# Patient Record
Sex: MALE | Race: WHITE | NOT HISPANIC OR LATINO | Employment: FULL TIME | URBAN - METROPOLITAN AREA
[De-identification: names, ages, dates, MRNs, and addresses within clinical notes are randomized per-mention and may not be internally consistent; named-entity substitution may affect disease eponyms.]

---

## 2018-08-16 ENCOUNTER — APPOINTMENT (OUTPATIENT)
Dept: LAB | Facility: HOSPITAL | Age: 43
End: 2018-08-16

## 2018-08-16 ENCOUNTER — TRANSCRIBE ORDERS (OUTPATIENT)
Dept: ADMINISTRATIVE | Facility: HOSPITAL | Age: 43
End: 2018-08-16

## 2018-08-16 DIAGNOSIS — Z00.8 HEALTH EXAMINATION IN POPULATION SURVEYS: ICD-10-CM

## 2018-08-16 DIAGNOSIS — Z00.8 HEALTH EXAMINATION IN POPULATION SURVEYS: Primary | ICD-10-CM

## 2018-08-16 LAB
CHOLEST SERPL-MCNC: 271 MG/DL (ref 0–200)
EST. AVERAGE GLUCOSE BLD GHB EST-MCNC: 114 MG/DL
HBA1C MFR BLD: 5.6 % (ref 4.2–6.3)
HDLC SERPL-MCNC: 52 MG/DL (ref 40–60)
LDLC SERPL CALC-MCNC: 193 MG/DL (ref 75–193)
NONHDLC SERPL-MCNC: 219 MG/DL
TRIGL SERPL-MCNC: 130 MG/DL (ref 44–166)

## 2018-08-16 PROCEDURE — 80061 LIPID PANEL: CPT

## 2018-08-16 PROCEDURE — 83036 HEMOGLOBIN GLYCOSYLATED A1C: CPT

## 2018-08-16 PROCEDURE — 36415 COLL VENOUS BLD VENIPUNCTURE: CPT

## 2021-01-12 ENCOUNTER — TELEPHONE (OUTPATIENT)
Dept: HEMATOLOGY ONCOLOGY | Facility: CLINIC | Age: 46
End: 2021-01-12

## 2021-01-12 NOTE — TELEPHONE ENCOUNTER
New Patient Encounter    New Patient Intake Form   Patient Details:  Milagro Juarez  1975  [id number]    Background Information:  [address] starts by opening a telephone encounter and gathering the following information   Who is calling to schedule? If not self, relationship to patient? provider   Referring Provider Kirby James   What is the diagnosis? Melanoma level IV back   Is this diagnosis confirmed? Yes   When was the diagnosis? 1/2021   Is there a confirmed diagnosis from a biopsy/tissue reviewed by pathology? yes   Were outside slides requested? Yes ([place])   Is patient aware of diagnosis? Yes   Is there a personal history and what kind? no   Is there a family history and what kind? melanoma   Reason for visit? New Diagnosis   Have you had any imaging or labs done? If so: when, where? no     Are records in EPIC? no   If patient has a prior history of breast cancer were old records obtained? NA   Was the patient told to bring a disk? no   Does the patient smoke or Vape? no   If yes, how many packs or cartridges per day? Scheduling Information:   Preferred [place]:  [place]     Are there any dates/time the patient cannot be seen? Miscellaneous: pt referred for wide excision and SLN bx  They will fax reports, slides will be requested  I will call pt to schedule when I have reports  I also asked that they include insurance information   After completing the above information, please route to Financial Counselor and the appropriate Nurse Navigator for review

## 2021-01-13 NOTE — TELEPHONE ENCOUNTER
Records rcd  Sent to right fax  Pt scheduled for 1/15/21 w/ Dr Carlos Pichardo  Pt has new [employer]  Office will faxe copy of card, pt does not have this info

## 2021-01-14 PROBLEM — C43.59 MALIGNANT MELANOMA OF UPPER BACK (HCC): Status: ACTIVE | Noted: 2021-01-14

## 2021-01-14 NOTE — PATIENT INSTRUCTIONS
Excision of Skin Lesion   AMBULATORY CARE:   What you need to know about excision of a skin lesion:  Excision of a skin lesion is surgery to remove a piece of skin tissue  The skin tissue may be malignant (skin cancer) or it may be benign  Benign means the skin tissue does not have cancer cells and cannot spread  How to prepare for excision of a skin lesion:  Your healthcare provider will talk to you about how to prepare for surgery  He may tell you not to eat or drink anything after midnight on the day of your surgery  He will tell you what medicines to take or not take on the day of your surgery  You may be given an antibiotic through your IV to help prevent a bacterial infection  What will happen during excision of a skin lesion:  You will be given local anesthesia to numb the surgery area  With local anesthesia, you may still feel pressure or pushing during surgery, but you should not feel any sharp pain  Your healthcare provider will lara the area of your skin that will be removed  He will make an incision on the marked area  He will remove the outer layer of your skin  He may also remove deeper layers of tissue underneath your skin  He may use heat to stop any bleeding  He will close the incision with stitches, staples, tissue glue, or medical tape  He may cover your incision with a bandage  Your healthcare provider may send samples of your tissue to the lab for tests  What will happen after excision of a skin lesion:  Your stitches will need to be removed after a period of time  The amount of time depends on the part of the body where the surgery was done  Stitches on the face will be removed within 5 to 7 days  Stitches on the trunk of your body will be removed within 7 to 10 days  Stitches on your arms or legs will be removed within 10 to 14 days  Medical tape usually falls off on its own in about 7 to 10 days  Risks of excision of a skin lesion:  You may bleed more than expected or get an infection  You may lose feeling, or you may feel tingling or prickling in the surgery area  Your scar may not look the way you expected  It may also limit your movement or affect your expressions if you had surgery on your face  Seek care immediately if:   · Your stitches come apart and the wound opens  Contact your healthcare provider if:   · You have pain in your incision that does not get better with medicine  · You have a fever or chills  · Your wound is red, swollen, bleeding, or draining pus  · You have questions or concerns about your condition or care  Care for your wound as directed:  Carefully wash the wound with soap and water  Dry the area and put on new, clean bandages as directed  Change your bandages when they get wet or dirty  You may take a shower 24 hours after  your surgery  Do not  soak in water (bathtub, hot tub, swimming pool) until after your stitches have been removed  Check your wound for signs of infection such as redness, swelling, or pus drainage  Follow up with your healthcare provider as directed: You will need to return to have your stitches removed  Write down your questions so you remember to ask them during your visits  © Copyright [address] Information is for End User's use only and may not be sold, redistributed or otherwise used for commercial purposes  All illustrations and images included in CareNotes® are the copyrighted property of [employer]  or [employer]  The above information is an  only  It is not intended as medical advice for individual conditions or treatments  Talk to your doctor, nurse or pharmacist before following any medical regimen to see if it is safe and effective for you  [place] Lymph Node Biopsy   AMBULATORY CARE:   What you need to know about a sentinel lymph node biopsy:  A sentinel lymph node (SLN) is usually the lymph node closest to a tumor  A biopsy is a procedure used to find and remove a SLN  During the biopsy, the SLN will be tested for cancer cells  If the test is positive, it may mean that cancer has spread to other places in your body  This information can help your healthcare provider decide what other treatments you need  How to prepare for a sentinel lymph node biopsy:   · You may need a nuclear scan before your procedure  During a nuclear scan, healthcare providers will inject a small amount of radioactive liquid near the tumor  Radioactive liquid will move to the location of your lymph nodes and help them show up better in pictures  A camera will take pictures of the lymph nodes  The pictures will help your healthcare provider plan for your procedure  · Your healthcare provider will talk to you about how to prepare for your procedure  He may tell you not to eat or drink anything after midnight on the day of your procedure  He will tell you what medicines to take or not take on the day of your procedure  You may be given contrast liquid during your biopsy  Tell your healthcare provider if you have ever had an allergic reaction to contrast liquid  Arrange for someone to drive you home and stay with you after your procedure  What will happen during a sentinel lymph node biopsy:   · You may be given an antibiotic through your IV to help prevent a bacterial infection  You may be given general anesthesia to keep you asleep and free from pain during procedure  You may instead be given local anesthesia to numb the area  With local anesthesia, you may still feel pressure or pushing during the procedure, but you should not feel any pain  · Your healthcare provider will inject blue contrast liquid, radioactive liquid, or both near the tumor  The liquid will move to the SLN  Your healthcare provider may use an instrument to help find the SLN  He will do this by gently moving an instrument over your skin  The instrument will show pictures of the SLN on a monitor   Your healthcare provider will make a small incision in the skin that covers the SLN  The SLN will be removed and checked for cancer cells  If cancer is found, your healthcare provider may remove several more lymph nodes for testing  Your incision may be closed with stitches or strips of medical tape and covered with a bandage  What will happen after a sentinel lymph node biopsy:  Healthcare providers will monitor you until you are awake  You may be able to go home after you are awake and your pain is controlled  Your urine or bowel movement may be blue for 24 to 48 hours after your procedure  This is caused by the blue contrast liquid given to you during the procedure  You may have bruising or swelling at the biopsy site  This is normal and expected  The arm or leg closest to the biopsy site may be sore  This should get better within 48 to 72 hours  Risks of a sentinel lymph node biopsy: You may bleed more than expected or get an infection  You may develop a condition called lymphedema  Lymphedema is tissue swelling in the body part nearest to where the SLN was removed  You may have long-term pain or discomfort in this area  Your skin in this area may be permanently thick or hard  Your nerves may be damaged during the procedure  This may cause numbness or tingling where the SLN was removed  It may also cause difficulty moving the body part closest to the SLN  You may have an allergic reaction to the contrast liquid  This may require medicine or other treatments  Seek care immediately if:   · Blood soaks through your bandage  · Your stitches come apart  · Your bruise suddenly gets larger and feels firm  Contact your healthcare provider if:   · You have a fever or chills  · Your wound is red, swollen, or draining pus  · You have nausea or are vomiting  · Your skin is itchy, swollen, or you have a rash  · Your pain does not get better after you take medicine for pain       · You have questions or concerns about your condition or care  Medicines: You may need any of the following:  · NSAIDs , such as ibuprofen, help decrease swelling, pain, and fever  This medicine is available with or without a doctor's order  NSAIDs can cause stomach bleeding or kidney problems in certain people  If you take blood thinner medicine, always ask your healthcare provider if NSAIDs are safe for you  Always read the medicine label and follow directions  · Acetaminophen  decreases pain and fever  It is available without a doctor's order  Ask how much to take and how often to take it  Follow directions  Read the labels of all other medicines you are using to see if they also contain acetaminophen, or ask your doctor or pharmacist  Acetaminophen can cause liver damage if not taken correctly  Do not use more than 4 grams (4,000 milligrams) total of acetaminophen in one day  · Prescription pain medicine  may be given  Ask your healthcare provider how to take this medicine safely  Some prescription pain medicines contain acetaminophen  Do not take other medicines that contain acetaminophen without talking to your healthcare provider  Too much acetaminophen may cause liver damage  Prescription pain medicine may cause constipation  Ask your healthcare provider how to prevent or treat constipation  · Take your medicine as directed  Contact your healthcare provider if you think your medicine is not helping or if you have side effects  Tell him or her if you are allergic to any medicine  Keep a list of the medicines, vitamins, and herbs you take  Include the amounts, and when and why you take them  Bring the list or the pill bottles to follow-up visits  Carry your medicine list with you in case of an emergency  Care for your wound as directed:  Ask your healthcare provider when your incision can get wet  Carefully wash around the incision with soap and water  It is okay to let soap and water gently run over your incision   Do not  scrub your incision  Gently pat dry the area and put on new, clean bandages as directed  Change your bandages when they get wet or dirty  If you have strips of medical tape, let them fall of on their own  It may take 10 to 14 days for them to fall off  Check your incision every day for signs of infection, such as redness, swelling, or pus  Do not put powders or lotions on your incision  If lymph nodes have been taken from your armpit, ask your healthcare provider when you can wear deodorant  Self-care:   · Apply ice  on your incision for 15 to 20 minutes every hour or as directed  Use an ice pack, or put crushed ice in a plastic bag  Cover it with a towel before you apply it to your skin  Ice helps prevent tissue damage and decreases swelling and pain  · Elevate  your arm or leg nearest to the biopsy site as often as you can  This will help decrease swelling and pain  Prop your arm or leg on pillows or blankets to keep it elevated above the level of your heart comfortably  · Do not do strenuous activities  for 24 to 48 hours  Strenuous activities include heavy lifting, sports, or running  If lymph nodes were taken from your armpit, do not push or pull with your arm  These activities may put too much stress on your incision  Rest and take short walks around the house  Ask your healthcare provider when you can return to your normal activities  · Drink plenty of liquids  as directed  This will help flush out contrast liquid from your body  Ask how much liquid to drink each day and which liquids are best for you  Ask your healthcare provider how to prevent lymphedema and infection:  Lymphedema is fluid buildup in fatty tissues under your skin  Lymphedema may happen where lymph nodes were removed or in the arm or leg closest to this area  An infection in your skin can make lymphedema worse  Ask your healthcare provider how you can decrease your risk for skin infections and lymphedema     Follow up with your healthcare provider as directed:  Write down your questions so you remember to ask them during your visits  © Copyright [address] Information is for End User's use only and may not be sold, redistributed or otherwise used for commercial purposes  All illustrations and images included in CareNotes® are the copyrighted property of [employer] Inc  or Dimitry Phillips  The above information is an  only  It is not intended as medical advice for individual conditions or treatments  Talk to your doctor, nurse or pharmacist before following any medical regimen to see if it is safe and effective for you

## 2021-01-14 NOTE — TELEPHONE ENCOUNTER
Insurance entered  Slides requested from [address] [place] [address]  Faxed to [phone]

## 2021-01-15 ENCOUNTER — PATIENT OUTREACH (OUTPATIENT)
Dept: CASE MANAGEMENT | Facility: HOSPITAL | Age: 46
End: 2021-01-15

## 2021-01-15 ENCOUNTER — CONSULT (OUTPATIENT)
Dept: SURGICAL ONCOLOGY | Facility: CLINIC | Age: 46
End: 2021-01-15
Payer: COMMERCIAL

## 2021-01-15 VITALS
HEIGHT: 68 IN | BODY MASS INDEX: 30.92 KG/M2 | HEART RATE: 112 BPM | SYSTOLIC BLOOD PRESSURE: 160 MMHG | RESPIRATION RATE: 16 BRPM | WEIGHT: 204 LBS | DIASTOLIC BLOOD PRESSURE: 92 MMHG | TEMPERATURE: 97.6 F

## 2021-01-15 DIAGNOSIS — C43.59 MALIGNANT MELANOMA OF UPPER BACK (HCC): Primary | ICD-10-CM

## 2021-01-15 PROCEDURE — 99204 OFFICE O/P NEW MOD 45 MIN: CPT | Performed by: SURGERY

## 2021-01-15 RX ORDER — TRAMADOL HYDROCHLORIDE 50 MG/1
50 TABLET ORAL EVERY 6 HOURS PRN
Qty: 10 TABLET | Refills: 0 | Status: SHIPPED | OUTPATIENT
Start: 2021-01-15 | End: 2021-02-22 | Stop reason: ALTCHOICE

## 2021-01-15 NOTE — PROGRESS NOTES
Surgical Oncology Follow Up       [facility] SURGICAL ONCOLOGY [place]  [facility] [address]    Etha Heimlich  1975  [id number]  [facility] SURGICAL ONCOLOGY [facility] [address]    Chief Complaint   Patient presents with    Melanoma     Pt here for consult for melanoma on his upper back  Pt states there was a small mole there for about a year, and it started to change approx 1-2 months ago  Reports it was pink and raised  Denies any other complaints  Assessment/Plan:    No problem-specific Assessment & Plan notes found for this encounter  Diagnoses and all orders for this visit:    Malignant melanoma of upper back [facility])  -     Ambulatory referral to social work care management program; Future        Advance Care Planning/Advance Directives:  Discussed disease status, cancer treatment plans and/or cancer treatment goals with the patient  Oncology History   Malignant melanoma of upper back ([facility] [address] )   1/4/2020 Biopsy    Left upper back shave biopsy:  - Malignant melanoma 2 3mm    Mitosis: 6  Ulceration: not identified         History of Present Illness:  Bee Hernandez is a 57-year-old man who over last year has had a pink spot on his left upper back  About a month ago, he noticed this started change  This culminated in a biopsy confirming a 32 3 mm deep melanoma  He has been referred for evaluation and treatment  He has fair skin and sunburns easily  Fair amount of sun exposure growing up  No prior skin cancer history  His father had a basal cell cancer treated in the past     Review of Systems   Constitutional: Negative  HENT: Negative  Eyes: Negative  Respiratory: Negative  Cardiovascular: Negative  Gastrointestinal: Negative  Endocrine: Negative  Genitourinary: Negative  Musculoskeletal: Negative      Skin:        Back melanoma   Allergic/Immunologic: Negative  Neurological: Negative  Hematological: Negative  Psychiatric/Behavioral: Negative  Patient Active Problem List   Diagnosis    Malignant melanoma of upper back ([facility] [address] )     History reviewed  No pertinent past medical history  History reviewed  No pertinent surgical history  Family History   Problem Relation Age of Onset    Basal cell carcinoma Father     No Known Problems Mother     Breast cancer Paternal Aunt 43    Breast cancer Paternal Grandmother [de-identified]     Social History     Socioeconomic History    Marital status: /Civil Union     Spouse name: Not on file    Number of children: Not on file    Years of education: Not on file    Highest education level: Not on file   Occupational History    Not on file   Social Needs    Financial resource strain: Not on file    Food insecurity     Worry: Not on file     Inability: Not on file   [employer] needs     Medical: Not on file     Non-medical: Not on file   Tobacco Use    Smoking status: Never Smoker    Smokeless tobacco: Never Used   Substance and Sexual Activity    Alcohol use: Yes    Drug use: Not on file    Sexual activity: Not on file   Lifestyle    Physical activity     Days per week: Not on file     Minutes per session: Not on file    Stress: Not on file   Relationships    Social connections     Talks on phone: Not on file     Gets together: Not on file     Attends Oriental orthodox service: Not on file     Active member of club or organization: Not on file     Attends meetings of clubs or organizations: Not on file     Relationship status: Not on file    Intimate partner violence     Fear of current or ex partner: Not on file     Emotionally abused: Not on file     Physically abused: Not on file     Forced sexual activity: Not on file   Other Topics Concern    Not on file   Social History Narrative    Not on file     No current outpatient medications on file    No Known Allergies  Vitals:    01/15/21 0843   BP: 160/92   Pulse: (!) 112   Resp: 16   Temp: 97 6 °F (36 4 °C)       Physical Exam  Vitals signs reviewed  Constitutional:       Appearance: Normal appearance  He is normal weight  HENT:      Head: Normocephalic and atraumatic  Nose: Nose normal       Mouth/Throat:      Mouth: Mucous membranes are dry  Eyes:      Extraocular Movements: Extraocular movements intact  Pupils: Pupils are equal, round, and reactive to light  Neck:      Musculoskeletal: Normal range of motion and neck supple  Cardiovascular:      Rate and Rhythm: Normal rate and regular rhythm  Pulses: Normal pulses  Heart sounds: Normal heart sounds  Pulmonary:      Effort: Pulmonary effort is normal    Abdominal:      General: Abdomen is flat  Palpations: Abdomen is soft  Musculoskeletal: Normal range of motion  Skin:     General: Skin is warm and dry  Comments: Left upper back scab  No satellite lesions  Scab measures approximately 8 mm in diameter  No cervical or axillary adenopathy  Neurological:      General: No focal deficit present  Mental Status: He is alert and oriented to person, place, and time  Psychiatric:         Mood and Affect: Mood normal          Behavior: Behavior normal          Thought Content: Thought content normal          Judgment: Judgment normal          Pathology:  Outside pathology report confirming 2 3 mm deep melanoma, 6 mitoses per mm squared, no ulceration  Labs:  CBC, Coags, BMP, Mg, Phos     Imaging  No results found  I reviewed the above laboratory and imaging data  Discussion/Summary:  49-year-old man, T3a melanoma, merit ting wide excision with sentinel node biopsy  Rationale for this along with risks and benefits of surgery including infection, bleeding, need for possible additional surgery, wound problems, discussed with him  He understands the plan and wishes to proceed as outlined  All questions answered and consents signed at this visit

## 2021-01-15 NOTE — LETTER
January 15, 2021     Chad Omer  [id number] [address]    Patient: Arielle Glass   YOB: 1975   Date of Visit: 1/15/2021       Dear Dr Eun Gutierrez:    Thank you for referring Em Menchaca to me for evaluation  Below are my notes for this consultation  If you have questions, please do not hesitate to call me  I look forward to following your patient along with you  Sincerely,        Bakari Miranda MD        CC: No Recipients  Bakari Miranda MD  1/15/2021  9:26 AM  Signed               Surgical Oncology Follow Up       [facility] SURGICAL ONCOLOGY [facility] [address]    Arielle Glass  1975  [id number]  [facility] SURGICAL ONCOLOGY [address] [address]    Chief Complaint   Patient presents with    Melanoma     Pt here for consult for melanoma on his upper back  Pt states there was a small mole there for about a year, and it started to change approx 1-2 months ago  Reports it was pink and raised  Denies any other complaints  Assessment/Plan:    No problem-specific Assessment & Plan notes found for this encounter  Diagnoses and all orders for this visit:    Malignant melanoma of upper back [facility])  -     Ambulatory referral to social work care management program; Future        Advance Care Planning/Advance Directives:  Discussed disease status, cancer treatment plans and/or cancer treatment goals with the patient  Oncology History   Malignant melanoma of upper back ([facility] [address] )   1/4/2020 Biopsy    Left upper back shave biopsy:  - Malignant melanoma 2 3mm    Mitosis: 6  Ulceration: not identified         History of Present Illness:  Jose Macias is a 64-year-old man who over last year has had a pink spot on his left upper back  About a month ago, he noticed this started change  This culminated in a biopsy confirming a 32 3 mm deep melanoma    He has been referred for evaluation and treatment  He has fair skin and sunburns easily  Fair amount of sun exposure growing up  No prior skin cancer history  His father had a basal cell cancer treated in the past     Review of Systems   Constitutional: Negative  HENT: Negative  Eyes: Negative  Respiratory: Negative  Cardiovascular: Negative  Gastrointestinal: Negative  Endocrine: Negative  Genitourinary: Negative  Musculoskeletal: Negative  Skin:        Back melanoma   Allergic/Immunologic: Negative  Neurological: Negative  Hematological: Negative  Psychiatric/Behavioral: Negative  Patient Active Problem List   Diagnosis    Malignant melanoma of upper back ([facility] [address] )     History reviewed  No pertinent past medical history  History reviewed  No pertinent surgical history  Family History   Problem Relation Age of Onset    Basal cell carcinoma Father     No Known Problems Mother     Breast cancer Paternal Aunt 43    Breast cancer Paternal Grandmother [de-identified]     Social History     Socioeconomic History    Marital status: /Civil Union     Spouse name: Not on file    Number of children: Not on file    Years of education: Not on file    Highest education level: Not on file   Occupational History    Not on file   Social Needs    Financial resource strain: Not on file    Food insecurity     Worry: Not on file     Inability: Not on file   [employer] needs     Medical: Not on file     Non-medical: Not on file   Tobacco Use    Smoking status: Never Smoker    Smokeless tobacco: Never Used   Substance and Sexual Activity    Alcohol use:  Yes    Drug use: Not on file    Sexual activity: Not on file   Lifestyle    Physical activity     Days per week: Not on file     Minutes per session: Not on file    Stress: Not on file   Relationships    Social connections     Talks on phone: Not on file     Gets together: Not on file     Attends Faith service: Not on file Active member of club or organization: Not on file     Attends meetings of clubs or organizations: Not on file     Relationship status: Not on file    Intimate partner violence     Fear of current or ex partner: Not on file     Emotionally abused: Not on file     Physically abused: Not on file     Forced sexual activity: Not on file   Other Topics Concern    Not on file   Social History Narrative    Not on file     No current outpatient medications on file  No Known Allergies  Vitals:    01/15/21 0843   BP: 160/92   Pulse: (!) 112   Resp: 16   Temp: 97 6 °F (36 4 °C)       Physical Exam  Vitals signs reviewed  Constitutional:       Appearance: Normal appearance  He is normal weight  HENT:      Head: Normocephalic and atraumatic  Nose: Nose normal       Mouth/Throat:      Mouth: Mucous membranes are dry  Eyes:      Extraocular Movements: Extraocular movements intact  Pupils: Pupils are equal, round, and reactive to light  Neck:      Musculoskeletal: Normal range of motion and neck supple  Cardiovascular:      Rate and Rhythm: Normal rate and regular rhythm  Pulses: Normal pulses  Heart sounds: Normal heart sounds  Pulmonary:      Effort: Pulmonary effort is normal    Abdominal:      General: Abdomen is flat  Palpations: Abdomen is soft  Musculoskeletal: Normal range of motion  Skin:     General: Skin is warm and dry  Comments: Left upper back scab  No satellite lesions  Scab measures approximately 8 mm in diameter  No cervical or axillary adenopathy  Neurological:      General: No focal deficit present  Mental Status: He is alert and oriented to person, place, and time  Psychiatric:         Mood and Affect: Mood normal          Behavior: Behavior normal          Thought Content:  Thought content normal          Judgment: Judgment normal          Pathology:  Outside pathology report confirming 2 3 mm deep melanoma, 6 mitoses per mm squared, no ulceration  Labs:  CBC, Coags, BMP, Mg, Phos     Imaging  No results found  I reviewed the above laboratory and imaging data  Discussion/Summary:  42-year-old man, T3a melanoma, merit ting wide excision with sentinel node biopsy  Rationale for this along with risks and benefits of surgery including infection, bleeding, need for possible additional surgery, wound problems, discussed with him  He understands the plan and wishes to proceed as outlined  All questions answered and consents signed at this visit

## 2021-01-15 NOTE — PROGRESS NOTES
Oncology LSW received pt's completed distress thermometer from this morning in which pt self scored a 10/10 and indicated fears, nervousness, and worry as emotional concerns  Because of pt's high distress score, a priority outreach call was made today  Ramses Metz is a pleasant 51 y/o gentleman with newly diagnosed melanoma on his upper back  Introduced self and role  Ramses Metz stated he is much better since his appointment and the doctor helped put him at ease  He explained when he got the news of his biopsy, the information given was that he had "Stage IV Melanoma" which naturally made him upset  He lost sleep for a week  Ramses Metz explained once he clarified further, he actually has "Level IV Melanoma" which means something very different  Ramses Choupat stated he is better now that he has the correct information and a better understanding  He is scheduled for surgical resection next month, and shared is nervous because he never had surgery before  Pt does have a h/o anxiety  LSW offered emotional support as needed  Ramses Metz reported he may call a few days prior to surgery for support  LSW invited and encouraged pt to do so as needed

## 2021-01-20 ENCOUNTER — HOSPITAL ENCOUNTER (OUTPATIENT)
Dept: RADIOLOGY | Facility: HOSPITAL | Age: 46
Discharge: HOME | End: 2021-01-20
Payer: COMMERCIAL

## 2021-01-20 ENCOUNTER — TRANSCRIBE ORDERS (OUTPATIENT)
Dept: LAB | Facility: HOSPITAL | Age: 46
End: 2021-01-20

## 2021-01-20 ENCOUNTER — APPOINTMENT (OUTPATIENT)
Dept: LAB | Facility: HOSPITAL | Age: 46
End: 2021-01-20
Payer: COMMERCIAL

## 2021-01-20 ENCOUNTER — OFFICE VISIT (OUTPATIENT)
Dept: LAB | Facility: HOSPITAL | Age: 46
End: 2021-01-20
Payer: COMMERCIAL

## 2021-01-20 DIAGNOSIS — C43.59 MALIGNANT MELANOMA OF UPPER BACK (HCC): ICD-10-CM

## 2021-01-20 LAB
ALBUMIN SERPL BCP-MCNC: 4.5 G/DL (ref 3.5–5.7)
ALP SERPL-CCNC: 43 U/L (ref 40–150)
ALT SERPL W P-5'-P-CCNC: 77 U/L (ref 7–52)
ANION GAP SERPL CALCULATED.3IONS-SCNC: 8 MMOL/L (ref 4–13)
AST SERPL W P-5'-P-CCNC: 43 U/L (ref 13–39)
ATRIAL RATE: 83 BPM
BASOPHILS # BLD AUTO: 0 THOUSANDS/ΜL (ref 0–0.1)
BASOPHILS NFR BLD AUTO: 1 % (ref 0–2)
BILIRUB SERPL-MCNC: 0.6 MG/DL (ref 0.2–1)
BUN SERPL-MCNC: 17 MG/DL (ref 7–25)
CALCIUM SERPL-MCNC: 9.1 MG/DL (ref 8.6–10.5)
CHLORIDE SERPL-SCNC: 105 MMOL/L (ref 98–107)
CO2 SERPL-SCNC: 25 MMOL/L (ref 21–31)
CREAT SERPL-MCNC: 1.17 MG/DL (ref 0.7–1.3)
EOSINOPHIL # BLD AUTO: 0.1 THOUSAND/ΜL (ref 0–0.61)
EOSINOPHIL NFR BLD AUTO: 2 % (ref 0–5)
ERYTHROCYTE [DISTWIDTH] IN BLOOD BY AUTOMATED COUNT: 13.6 % (ref 11.5–14.5)
GFR SERPL CREATININE-BSD FRML MDRD: 74 ML/MIN/1.73SQ M
GLUCOSE P FAST SERPL-MCNC: 115 MG/DL (ref 65–99)
HCT VFR BLD AUTO: 46.4 % (ref 42–47)
HGB BLD-MCNC: 15.5 G/DL (ref 14–18)
LYMPHOCYTES # BLD AUTO: 2.1 THOUSANDS/ΜL (ref 0.6–4.47)
LYMPHOCYTES NFR BLD AUTO: 41 % (ref 21–51)
MCH RBC QN AUTO: 31.2 PG (ref 26–34)
MCHC RBC AUTO-ENTMCNC: 33.3 G/DL (ref 31–37)
MCV RBC AUTO: 94 FL (ref 81–99)
MONOCYTES # BLD AUTO: 0.3 THOUSAND/ΜL (ref 0.17–1.22)
MONOCYTES NFR BLD AUTO: 6 % (ref 2–12)
NEUTROPHILS # BLD AUTO: 2.7 THOUSANDS/ΜL (ref 1.4–6.5)
NEUTS SEG NFR BLD AUTO: 51 % (ref 42–75)
P AXIS: 40 DEGREES
PLATELET # BLD AUTO: 188 THOUSANDS/UL (ref 149–390)
PMV BLD AUTO: 9.6 FL (ref 8.6–11.7)
POTASSIUM SERPL-SCNC: 3.9 MMOL/L (ref 3.5–5.5)
PR INTERVAL: 162 MS
PROT SERPL-MCNC: 7.2 G/DL (ref 6.4–8.9)
QRS AXIS: 12 DEGREES
QRSD INTERVAL: 88 MS
QT INTERVAL: 372 MS
QTC INTERVAL: 437 MS
RBC # BLD AUTO: 4.95 MILLION/UL (ref 4.3–5.9)
SODIUM SERPL-SCNC: 138 MMOL/L (ref 134–143)
T WAVE AXIS: 23 DEGREES
VENTRICULAR RATE: 83 BPM
WBC # BLD AUTO: 5.2 THOUSAND/UL (ref 4.8–10.8)

## 2021-01-20 PROCEDURE — 80053 COMPREHEN METABOLIC PANEL: CPT

## 2021-01-20 PROCEDURE — 71046 X-RAY EXAM CHEST 2 VIEWS: CPT

## 2021-01-20 PROCEDURE — 93010 ELECTROCARDIOGRAM REPORT: CPT | Performed by: INTERNAL MEDICINE

## 2021-01-20 PROCEDURE — 85025 COMPLETE CBC W/AUTO DIFF WBC: CPT

## 2021-01-20 PROCEDURE — 36415 COLL VENOUS BLD VENIPUNCTURE: CPT

## 2021-01-21 ENCOUNTER — LAB REQUISITION (OUTPATIENT)
Dept: LAB | Facility: HOSPITAL | Age: 46
End: 2021-01-21
Payer: COMMERCIAL

## 2021-01-21 DIAGNOSIS — C43.59 MALIGNANT MELANOMA OF OTHER PART OF TRUNK (HCC): ICD-10-CM

## 2021-01-21 PROCEDURE — 88321 CONSLTJ&REPRT SLD PREP ELSWR: CPT | Performed by: STUDENT IN AN ORGANIZED HEALTH CARE EDUCATION/TRAINING PROGRAM

## 2021-02-01 ENCOUNTER — ANESTHESIA EVENT (OUTPATIENT)
Dept: PERIOP | Facility: HOSPITAL | Age: 46
End: 2021-02-01
Payer: COMMERCIAL

## 2021-02-01 NOTE — PRE-PROCEDURE INSTRUCTIONS
No outpatient medications have been marked as taking for the 2/2/21 encounter [facility] Encounter)  Have you had / have a sore throat? NO   have you had / have a cough less than 1 week? NO  Have you had / have a fever greater than 100 0 - 100  4? NO  Are you experiencing any shortness of breath?  NO    Pre procedure instructions given, verbalizes understanding REVIEWED ON

## 2021-02-02 ENCOUNTER — HOSPITAL ENCOUNTER (OUTPATIENT)
Dept: RADIOLOGY | Facility: HOSPITAL | Age: 46
Discharge: HOME | End: 2021-02-02
Attending: SURGERY
Payer: COMMERCIAL

## 2021-02-02 ENCOUNTER — ANESTHESIA (OUTPATIENT)
Dept: PERIOP | Facility: HOSPITAL | Age: 46
End: 2021-02-02
Payer: COMMERCIAL

## 2021-02-02 ENCOUNTER — HOSPITAL ENCOUNTER (OUTPATIENT)
Facility: HOSPITAL | Age: 46
Setting detail: OUTPATIENT SURGERY
Discharge: HOME | End: 2021-02-02
Attending: SURGERY | Admitting: SURGERY
Payer: COMMERCIAL

## 2021-02-02 VITALS
TEMPERATURE: 97.9 F | DIASTOLIC BLOOD PRESSURE: 74 MMHG | HEART RATE: 74 BPM | HEIGHT: 68 IN | SYSTOLIC BLOOD PRESSURE: 141 MMHG | OXYGEN SATURATION: 98 % | BODY MASS INDEX: 30.31 KG/M2 | RESPIRATION RATE: 16 BRPM | WEIGHT: 200 LBS

## 2021-02-02 VITALS — HEART RATE: 70 BPM

## 2021-02-02 DIAGNOSIS — C43.59 MALIGNANT MELANOMA OF UPPER BACK (HCC): ICD-10-CM

## 2021-02-02 PROCEDURE — 88305 TISSUE EXAM BY PATHOLOGIST: CPT | Performed by: PATHOLOGY

## 2021-02-02 PROCEDURE — 88307 TISSUE EXAM BY PATHOLOGIST: CPT | Performed by: PATHOLOGY

## 2021-02-02 PROCEDURE — 88342 IMHCHEM/IMCYTCHM 1ST ANTB: CPT | Performed by: PATHOLOGY

## 2021-02-02 PROCEDURE — 88341 IMHCHEM/IMCYTCHM EA ADD ANTB: CPT | Performed by: PATHOLOGY

## 2021-02-02 PROCEDURE — 78195 LYMPH SYSTEM IMAGING: CPT

## 2021-02-02 PROCEDURE — 38900 IO MAP OF SENT LYMPH NODE: CPT | Performed by: SURGERY

## 2021-02-02 PROCEDURE — G1004 CDSM NDSC: HCPCS

## 2021-02-02 PROCEDURE — 99024 POSTOP FOLLOW-UP VISIT: CPT | Performed by: SURGERY

## 2021-02-02 PROCEDURE — A9541 TC99M SULFUR COLLOID: HCPCS

## 2021-02-02 PROCEDURE — 88363 XM ARCHIVE TISSUE MOLEC ANAL: CPT | Performed by: PATHOLOGY

## 2021-02-02 PROCEDURE — 14000 TIS TRNFR TRUNK 10 SQ CM/<: CPT | Performed by: SURGERY

## 2021-02-02 PROCEDURE — 38525 BIOPSY/REMOVAL LYMPH NODES: CPT | Performed by: SURGERY

## 2021-02-02 RX ORDER — LIDOCAINE HYDROCHLORIDE 10 MG/ML
INJECTION, SOLUTION EPIDURAL; INFILTRATION; INTRACAUDAL; PERINEURAL AS NEEDED
Status: DISCONTINUED | OUTPATIENT
Start: 2021-02-02 | End: 2021-02-02

## 2021-02-02 RX ORDER — ROCURONIUM BROMIDE 10 MG/ML
INJECTION, SOLUTION INTRAVENOUS AS NEEDED
Status: DISCONTINUED | OUTPATIENT
Start: 2021-02-02 | End: 2021-02-02

## 2021-02-02 RX ORDER — OXYCODONE HYDROCHLORIDE 5 MG/1
10 TABLET ORAL EVERY 4 HOURS PRN
Status: DISCONTINUED | OUTPATIENT
Start: 2021-02-02 | End: 2021-02-02 | Stop reason: HOSPADM

## 2021-02-02 RX ORDER — OXYCODONE HYDROCHLORIDE 5 MG/1
5 TABLET ORAL EVERY 4 HOURS PRN
Status: DISCONTINUED | OUTPATIENT
Start: 2021-02-02 | End: 2021-02-02 | Stop reason: HOSPADM

## 2021-02-02 RX ORDER — MIDAZOLAM HYDROCHLORIDE 2 MG/2ML
INJECTION, SOLUTION INTRAMUSCULAR; INTRAVENOUS AS NEEDED
Status: DISCONTINUED | OUTPATIENT
Start: 2021-02-02 | End: 2021-02-02

## 2021-02-02 RX ORDER — ISOSULFAN BLUE 50 MG/5ML
INJECTION, SOLUTION SUBCUTANEOUS AS NEEDED
Status: DISCONTINUED | OUTPATIENT
Start: 2021-02-02 | End: 2021-02-02 | Stop reason: HOSPADM

## 2021-02-02 RX ORDER — ACETAMINOPHEN 325 MG/1
975 TABLET ORAL EVERY 6 HOURS PRN
Status: DISCONTINUED | OUTPATIENT
Start: 2021-02-02 | End: 2021-02-02 | Stop reason: HOSPADM

## 2021-02-02 RX ORDER — HYDROMORPHONE HCL/PF 1 MG/ML
0.5 SYRINGE (ML) INJECTION
Status: DISCONTINUED | OUTPATIENT
Start: 2021-02-02 | End: 2021-02-02 | Stop reason: HOSPADM

## 2021-02-02 RX ORDER — SODIUM CHLORIDE, SODIUM LACTATE, POTASSIUM CHLORIDE, CALCIUM CHLORIDE 600; 310; 30; 20 MG/100ML; MG/100ML; MG/100ML; MG/100ML
125 INJECTION, SOLUTION INTRAVENOUS CONTINUOUS
Status: DISCONTINUED | OUTPATIENT
Start: 2021-02-02 | End: 2021-02-02 | Stop reason: HOSPADM

## 2021-02-02 RX ORDER — FENTANYL CITRATE 50 UG/ML
INJECTION, SOLUTION INTRAMUSCULAR; INTRAVENOUS AS NEEDED
Status: DISCONTINUED | OUTPATIENT
Start: 2021-02-02 | End: 2021-02-02

## 2021-02-02 RX ORDER — HYDROMORPHONE HCL/PF 1 MG/ML
SYRINGE (ML) INJECTION AS NEEDED
Status: DISCONTINUED | OUTPATIENT
Start: 2021-02-02 | End: 2021-02-02

## 2021-02-02 RX ORDER — ONDANSETRON 2 MG/ML
INJECTION INTRAMUSCULAR; INTRAVENOUS AS NEEDED
Status: DISCONTINUED | OUTPATIENT
Start: 2021-02-02 | End: 2021-02-02

## 2021-02-02 RX ORDER — DEXAMETHASONE SODIUM PHOSPHATE 10 MG/ML
INJECTION, SOLUTION INTRAMUSCULAR; INTRAVENOUS AS NEEDED
Status: DISCONTINUED | OUTPATIENT
Start: 2021-02-02 | End: 2021-02-02

## 2021-02-02 RX ORDER — ONDANSETRON 2 MG/ML
4 INJECTION INTRAMUSCULAR; INTRAVENOUS EVERY 6 HOURS PRN
Status: DISCONTINUED | OUTPATIENT
Start: 2021-02-02 | End: 2021-02-02 | Stop reason: HOSPADM

## 2021-02-02 RX ORDER — PROPOFOL 10 MG/ML
INJECTION, EMULSION INTRAVENOUS AS NEEDED
Status: DISCONTINUED | OUTPATIENT
Start: 2021-02-02 | End: 2021-02-02

## 2021-02-02 RX ORDER — FENTANYL CITRATE/PF 50 MCG/ML
25 SYRINGE (ML) INJECTION
Status: DISCONTINUED | OUTPATIENT
Start: 2021-02-02 | End: 2021-02-02 | Stop reason: HOSPADM

## 2021-02-02 RX ORDER — GINSENG 100 MG
CAPSULE ORAL AS NEEDED
Status: DISCONTINUED | OUTPATIENT
Start: 2021-02-02 | End: 2021-02-02 | Stop reason: HOSPADM

## 2021-02-02 RX ORDER — ONDANSETRON 2 MG/ML
4 INJECTION INTRAMUSCULAR; INTRAVENOUS ONCE
Status: COMPLETED | OUTPATIENT
Start: 2021-02-02 | End: 2021-02-02

## 2021-02-02 RX ADMIN — SODIUM CHLORIDE, SODIUM LACTATE, POTASSIUM CHLORIDE, AND CALCIUM CHLORIDE: .6; .31; .03; .02 INJECTION, SOLUTION INTRAVENOUS at 15:56

## 2021-02-02 RX ADMIN — PHENYLEPHRINE HYDROCHLORIDE 100 MCG: 10 INJECTION INTRAVENOUS at 15:41

## 2021-02-02 RX ADMIN — ONDANSETRON 4 MG: 2 INJECTION INTRAMUSCULAR; INTRAVENOUS at 20:04

## 2021-02-02 RX ADMIN — Medication 25 MCG: at 19:03

## 2021-02-02 RX ADMIN — PHENYLEPHRINE HYDROCHLORIDE 100 MCG: 10 INJECTION INTRAVENOUS at 16:23

## 2021-02-02 RX ADMIN — FENTANYL CITRATE 50 MCG: 50 INJECTION INTRAMUSCULAR; INTRAVENOUS at 16:02

## 2021-02-02 RX ADMIN — PHENYLEPHRINE HYDROCHLORIDE 100 MCG: 10 INJECTION INTRAVENOUS at 15:29

## 2021-02-02 RX ADMIN — PHENYLEPHRINE HYDROCHLORIDE 100 MCG: 10 INJECTION INTRAVENOUS at 15:43

## 2021-02-02 RX ADMIN — HYDROMORPHONE HYDROCHLORIDE 0.5 MG: 1 INJECTION, SOLUTION INTRAMUSCULAR; INTRAVENOUS; SUBCUTANEOUS at 16:49

## 2021-02-02 RX ADMIN — LIDOCAINE HYDROCHLORIDE 50 MG: 10 INJECTION, SOLUTION EPIDURAL; INFILTRATION; INTRACAUDAL; PERINEURAL at 15:05

## 2021-02-02 RX ADMIN — ONDANSETRON 4 MG: 2 INJECTION INTRAMUSCULAR; INTRAVENOUS at 18:18

## 2021-02-02 RX ADMIN — Medication 25 MCG: at 19:08

## 2021-02-02 RX ADMIN — PHENYLEPHRINE HYDROCHLORIDE 100 MCG: 10 INJECTION INTRAVENOUS at 16:08

## 2021-02-02 RX ADMIN — SODIUM CHLORIDE, SODIUM LACTATE, POTASSIUM CHLORIDE, AND CALCIUM CHLORIDE 125 ML/HR: .6; .31; .03; .02 INJECTION, SOLUTION INTRAVENOUS at 12:20

## 2021-02-02 RX ADMIN — HYDROMORPHONE HYDROCHLORIDE 1 MG: 1 INJECTION, SOLUTION INTRAMUSCULAR; INTRAVENOUS; SUBCUTANEOUS at 17:52

## 2021-02-02 RX ADMIN — PHENYLEPHRINE HYDROCHLORIDE 100 MCG: 10 INJECTION INTRAVENOUS at 15:51

## 2021-02-02 RX ADMIN — PROPOFOL 100 MG: 10 INJECTION, EMULSION INTRAVENOUS at 15:06

## 2021-02-02 RX ADMIN — PROPOFOL 200 MG: 10 INJECTION, EMULSION INTRAVENOUS at 15:05

## 2021-02-02 RX ADMIN — DEXAMETHASONE SODIUM PHOSPHATE 10 MG: 10 INJECTION, SOLUTION INTRAMUSCULAR; INTRAVENOUS at 15:05

## 2021-02-02 RX ADMIN — ONDANSETRON 4 MG: 2 INJECTION INTRAMUSCULAR; INTRAVENOUS at 19:06

## 2021-02-02 RX ADMIN — MIDAZOLAM 4 MG: 1 INJECTION INTRAMUSCULAR; INTRAVENOUS at 14:53

## 2021-02-02 RX ADMIN — HYDROMORPHONE HYDROCHLORIDE 0.5 MG: 1 INJECTION, SOLUTION INTRAMUSCULAR; INTRAVENOUS; SUBCUTANEOUS at 15:23

## 2021-02-02 RX ADMIN — PHENYLEPHRINE HYDROCHLORIDE 100 MCG: 10 INJECTION INTRAVENOUS at 16:11

## 2021-02-02 RX ADMIN — ROCURONIUM BROMIDE 30 MG: 50 INJECTION, SOLUTION INTRAVENOUS at 15:06

## 2021-02-02 RX ADMIN — FENTANYL CITRATE 50 MCG: 50 INJECTION INTRAMUSCULAR; INTRAVENOUS at 15:05

## 2021-02-02 NOTE — DISCHARGE INSTRUCTIONS
Please call the office when you leave to schedule an appointment to be seen in 2 weeks    Activity:    Do not lift more than 10 pounds (a gallon of milk) for 1-2 weeks post-operatively                 Walking is encouraged  Normal daily activities including climbing steps are okay  Do not engage in strenuous activity or contact sports for 4-6 weeks post-operatively    Return to Work:   Okay to return to work in one week    Diet:   You may return to your normal heart healthy diet  Wound Care: Your back wound is closed with non-dissolvable stitches that will need to be removed in the office  Your armpit incision is closed with dissolvable stitches and glue  Please empty the drain as needed and record the output in a journal to bring to your follow up appointment  It is okay to shower  Wash incision gently with soap and water and pat dry  Do not soak incisions in bath water or swim for two weeks  Do not apply any creams or ointments  Pain Medication:   Please take as directed  No driving while taking narcotic pain medications    Other:  If you have questions after discharge please call the office      If you have increased pain, fever >101 5, increased drainage, redness or a bad smell at your surgery site, please call us immediately or come directly to the Emergency Room

## 2021-02-02 NOTE — ANESTHESIA POSTPROCEDURE EVALUATION
Post-Op Assessment Note    CV Status:  Stable    Pain management: adequate     Mental Status:  Awake   Hydration Status:  Stable   PONV Controlled:  Controlled   Airway Patency:  Patent      Post Op Vitals Reviewed: Yes      Staff: Anesthesiologist, CRNA         No complications documented      /76 (02/02/21 1829)    Temp (!) 97 2 °F (36 2 °C) (02/02/21 1829)    Pulse 65 (02/02/21 1829)   Resp 18 (02/02/21 1829)    SpO2 100 % (02/02/21 1829)

## 2021-02-02 NOTE — ANESTHESIA PREPROCEDURE EVALUATION
Procedure:  WIDE EXCISION MELANOMA SCAR UPPER BACK, intraoperative lymphatic mapping, sentinel lymph node biopsy; [address] (Left Back)  BIOPSY LYMPH NODE SENTINEL (N/A Axilla)    Relevant Problems   No relevant active problems      Malignant melanoma of the upper back  Anxiety         Anesthesia Plan  ASA Score- 2     Anesthesia Type- general with ASA Monitors  Additional Monitors:   Airway Plan: ETT  Plan Factors-    Chart reviewed  Existing labs reviewed  Patient summary reviewed  Patient is not a current smoker  Patient did not smoke on day of surgery  Induction- intravenous      Postoperative Plan-   Planned trial extubation    Informed Consent-

## 2021-02-03 NOTE — OP NOTE
OPERATIVE REPORT  PATIENT NAME: Zelda Miranda    :  1975  MRN: [id number]  Pt Location: [facility] OR ROOM 03    SURGERY DATE: 2021    Surgeon(s) and Role:     * Jonnie Gilbetr MD - Primary     * Amanda Gonzales MD - Assisting    Preop Diagnosis:  Malignant melanoma of upper back ([facility] [address] ) [C43 59]    Post-Op Diagnosis Codes:     * Malignant melanoma of upper back ([facility] [address] ) [C43 59]    Procedure(s) (LRB):  WIDE EXCISION MELANOMA SCAR UPPER BACK,  Closure of defect measuring 8 0 x 6 5 x 2 0 cm in size with advancement flaps; Injection of radioactive dye and blue dye; intraoperative lymphatic mapping,  Left axillary sentinel lymph node biopsy x 2    Specimen(s):  ID Type Source Tests Collected by Time Destination   1 : number 1 Tissue Lymph Node, [place] TISSUE EXAM Jonnie Gilbert MD 2021  2:43 PM    2 : left back melanoma Tissue Back TISSUE EXAM Jonnie Gilbert MD 2021  3:47 PM    3 : sentinel node #2 Tissue Lymph Node, [place] TISSUE EXAM Jonnie Gilbert MD 2021  5:57 PM        Estimated Blood Loss:   Minimal    Drains:  Closed/Suction Drain Left Back Bulb 10 Fr  (Active)   Site Description Unable to view 21   Dressing Status Clean;Dry; Intact 21   Drainage Appearance Serosanguineous 21   Status To bulb suction 21   Output (mL) 10 mL 21   Number of days: 0       Anesthesia Type:   General    Operative Indications:  Malignant melanoma of upper back (HCC) [C43 59]  Intermediate thickness, 2 3 mm in diameter    Operative Findings:   left upper back defect measuring 8 0 x 6 5 by 2 0 cm;   left axillary sentinel node packet with  Gamma count of 3600;  2nd left axillary sentinel node packet with gamma count of 739    Complications:   None    Procedure and Technique:   the patient is a 78-year-old man with a intermediate thickness melanoma  He comes in for wide excision and sentinel node biopsy    He was 1st seen in the nuclear medicine suite where he underwent injection of radioactive technetium  Lymphoscintigraphy revealed an area of uptake in the left axilla was marked before the patient was brought to the OR  Once in the OR his identified by proper time-out  He was then intubated by the anesthesia team   He was then positioned in the right lateral decubitus position  2 cc of blue dye was injected in the dermal compartment around the left upper back melanoma  2 cm margins were drawn around the lesion  The lines were incised sharply  Cautery was used to dissect through the dermis and subcu tissues tissue to obtain a full-thickness excision  The specimen was oriented sutures and clips in the usual fashion  In order to close the defect which measured 8 0 x 6 5 x 2 0 cm, flaps were undermined on both sides of the incision  This enabled us to bring the wound edges together using 0 Vicryl sutures to reapproximate the deeper layers followed by 3-0 Vicryl for the dermal layers followed by running 3-0 nylon sutures for the final skin closure  A 10 Western Za Redd drain was placed into the wound prior to closure and anchored to the skin with 3-0 nylon sutures  The wound was then dressed with antibiotic ointment and a Tegaderm  the patient was then  Repositioned in the supine position to allow access to the left axilla  He was then prepped and draped in the usual fashion  The previously marked sentinel nodes were re-identified, an incision made sharply over the site in the left axilla  Cautery was used to dissect through the dermis and subcutaneous tissue  Using a gamma probe, we were able to dissect down to 2 lymph node packets as had been seen on preoperative lymphoscintigraphy  The 1st of these was dissected out in hemostatic fashion  Lymphatic pedicle was clipped  Maximum gamma count was 3600  A 2nd lymph node packet was also identified and dissected out  This was larger, but with a lower gamma count of 120    No significant radio activity was left in the axilla upon removal of these nodes  Field was then irrigated, then closed with 3-0 Vicryl for the deeper layers followed by 3-0 Vicryl for the dermis followed by running 4-0 Monocryl subcuticular skin closure  Skin glue was applied  The patient was then awakened transferred to the recovery room in stable condition  Sponge and instrument count were correct at the end the case      I was present for the entire procedure    Patient Disposition:  PACU     SIGNATURE: Tonya Lesch, MD  DATE: February 2, 2021  TIME: 8:33 PM

## 2021-02-05 ENCOUNTER — OFFICE VISIT (OUTPATIENT)
Dept: SURGICAL ONCOLOGY | Facility: CLINIC | Age: 46
End: 2021-02-05

## 2021-02-05 VITALS
RESPIRATION RATE: 16 BRPM | BODY MASS INDEX: 30.31 KG/M2 | DIASTOLIC BLOOD PRESSURE: 80 MMHG | TEMPERATURE: 98.3 F | SYSTOLIC BLOOD PRESSURE: 150 MMHG | HEIGHT: 68 IN | WEIGHT: 200 LBS | HEART RATE: 89 BPM

## 2021-02-05 DIAGNOSIS — C43.59 MALIGNANT MELANOMA OF UPPER BACK (HCC): Primary | ICD-10-CM

## 2021-02-05 PROCEDURE — 99024 POSTOP FOLLOW-UP VISIT: CPT | Performed by: SURGERY

## 2021-02-05 NOTE — LETTER
February 5, 2021     Referral [address] [place] [id number]    Patient: Palma Rodriguez   YOB: 1975   Date of Visit: 2/5/2021       Dear Dr Jacklyn Gutierrez:    Thank you for referring John Keyes to me for evaluation  Below are my notes for this consultation  If you have questions, please do not hesitate to call me  I look forward to following your patient along with you  Sincerely,        Lesly Bokyin MD        CC: No Recipients  Lesly Boykin MD  2/5/2021 11:30 AM  Sign when Signing Visit     Surgical Oncology Follow Up       [facility]  [facility] [address] [address]    Palma Rodriguez  1975  [id number]  [facility]  [facility] SURGICAL ONCOLOG[facility] [address] [address]    Chief Complaint   Patient presents with    Post-op     Drain check       Assessment/Plan:    No problem-specific Assessment & Plan notes found for this encounter  Diagnoses and all orders for this visit:    Malignant melanoma of upper back [facility])        Advance Care Planning/Advance Directives:  Discussed disease status, cancer treatment plans and/or cancer treatment goals with the patient  Oncology History   Malignant melanoma of upper back ([facility] [address] )   1/4/2020 Biopsy    Left upper back shave biopsy:  - Malignant melanoma 2 3mm    Mitosis: 6  Ulceration: not identified         History of Present Illness:   Patient is a 47-year-old man here for a drain pole status post recent excision of back melanoma with left axillary sentinel node biopsy   -Interval History:  Drain outputs have dropped significantly over last 2 days  No fever chills or evidence of wound compromise or breakdown  Review of Systems:  Review of Systems   Skin: Positive for wound  Patient Active Problem List   Diagnosis    Malignant melanoma of upper back ([facility] [address] )     No past medical history on file    Past Surgical History:   Procedure Laterality Date    LYMPH NODE BIOPSY N/A 2/2/2021    Procedure: BIOPSY LYMPH NODE LEFT AXILLARY SENTINEL;  Surgeon: Joseph Hoskins MD;  Location: [facility] MAIN OR;  Service: Surgical Oncology    NO PAST SURGERIES      SKIN LESION EXCISION Left 2/2/2021    Procedure: WIDE EXCISION MELANOMA SCAR UPPER BACK, intraoperative lymphatic mapping, sentinel lymph node biopsy; [address];  Surgeon: Joseph Hoskins MD;  Location: [facility] MAIN OR;  Service: Surgical Oncology     Family History   Problem Relation Age of Onset    Basal cell carcinoma Father     No Known Problems Mother     Breast cancer Paternal Aunt 43    Breast cancer Paternal Grandmother [de-identified]     Social History     Socioeconomic History    Marital status: /Civil Union     Spouse name: Not on file    Number of children: Not on file    Years of education: Not on file    Highest education level: Not on file   Occupational History    Not on file   Social Needs    Financial resource strain: Not on file    Food insecurity     Worry: Not on file     Inability: Not on file   [employer] needs     Medical: Not on file     Non-medical: Not on file   Tobacco Use    Smoking status: Never Smoker    Smokeless tobacco: Never Used   Substance and Sexual Activity    Alcohol use:  Yes    Drug use: Never    Sexual activity: Not on file   Lifestyle    Physical activity     Days per week: Not on file     Minutes per session: Not on file    Stress: Not on file   Relationships    Social connections     Talks on phone: Not on file     Gets together: Not on file     Attends Anglican service: Not on file     Active member of club or organization: Not on file     Attends meetings of clubs or organizations: Not on file     Relationship status: Not on file    Intimate partner violence     Fear of current or ex partner: Not on file     Emotionally abused: Not on file     Physically abused: Not on file     Forced sexual activity: Not on file Other Topics Concern    Not on file   Social History Narrative    Not on file       Current Outpatient Medications:     traMADol (ULTRAM) 50 mg tablet, Take 1 tablet (50 mg total) by mouth every 6 (six) hours as needed for moderate pain, Disp: 10 tablet, Rfl: 0  No Known Allergies  Vitals:    02/05/21 1046   BP: 150/80   Pulse: 89   Resp: 16   Temp: 98 3 °F (36 8 °C)       Physical Exam  Skin:     Comments: Back incision clean dry intact  RUSSELL outputs minimal   Left axillary incision clean dry intact  Results:  Labs:  none    Imaging  Xr Chest Pa & Lateral    Result Date: 1/22/2021  Narrative: CHEST INDICATION:   C43 59: Malignant melanoma of other part of trunk  COMPARISON:  None EXAM PERFORMED/VIEWS:  XR CHEST PA & LATERAL FINDINGS: Cardiomediastinal silhouette appears unremarkable  The lungs are clear  No pneumothorax or pleural effusion  Osseous structures appear within normal limits for patient age  Impression: No acute cardiopulmonary disease  Workstation performed: [id number]     Nm Lymphatic Melanoma    Result Date: 2/2/2021  Narrative: SENTINEL NODE LYMPHOSCINTIGRAPHY INDICATION:   Upper back melanoma  FINDINGS: 0 5 mCi Tc-99m sulfur colloid (0 6 cc volume) was administered intradermally in divided doses by Dr Tejas Ochoa in the region of the patients left upper back melanoma  Scintigraphic images were obtained over the thorax in multiple projections  Two foci of sentinel bryan activity identified in the left axilla  Using scintigraphic guidance, the corresponding skin site was marked with an indelible marker  The patient was transferred to the operating room in satisfactory condition  Impression: 1  [place] lymph node localized to the left axilla  Workstation performed: [id number]     I reviewed the above laboratory and imaging data  Discussion/Summary:  78-year-old man status post left axillary sentinel node biopsy with excision of left back melanoma  DC drain today  Follow-up in 2 weeks for path  Review period

## 2021-02-05 NOTE — PROGRESS NOTES
Surgical Oncology Follow Up       [facility] SURGICAL ONCOLOGY [place]  [facility] [address]    Vickey Ruby  1975  [id number]  [facility] SURGICAL ONCOLOGY [facility] [address]    Chief Complaint   Patient presents with    Post-op     Drain check       Assessment/Plan:    No problem-specific Assessment & Plan notes found for this encounter  Diagnoses and all orders for this visit:    Malignant melanoma of upper back [facility])        Advance Care Planning/Advance Directives:  Discussed disease status, cancer treatment plans and/or cancer treatment goals with the patient  Oncology History   Malignant melanoma of upper back ([facility] [address] )   1/4/2020 Biopsy    Left upper back shave biopsy:  - Malignant melanoma 2 3mm    Mitosis: 6  Ulceration: not identified         History of Present Illness:   Patient is a 67-year-old man here for a drain pole status post recent excision of back melanoma with left axillary sentinel node biopsy   -Interval History:  Drain outputs have dropped significantly over last 2 days  No fever chills or evidence of wound compromise or breakdown  Review of Systems:  Review of Systems   Skin: Positive for wound  Patient Active Problem List   Diagnosis    Malignant melanoma of upper back ([facility] [address] )     No past medical history on file    Past Surgical History:   Procedure Laterality Date    LYMPH NODE BIOPSY N/A 2/2/2021    Procedure: BIOPSY LYMPH NODE LEFT AXILLARY SENTINEL;  Surgeon: Cortez Menendez MD;  [facility]: [facility];  Service: Surgical Oncology    NO PAST SURGERIES      SKIN LESION EXCISION Left 2/2/2021    Procedure: WIDE EXCISION MELANOMA SCAR UPPER BACK, intraoperative lymphatic mapping, sentinel lymph node biopsy; [address];  Surgeon: Cortez Menendez MD;  Location: [facility];  Service: Surgical Oncology     Family History   Problem Relation Age of Onset    Basal cell carcinoma Father     No Known Problems Mother     Breast cancer Paternal Aunt 43    Breast cancer Paternal Grandmother [de-identified]     Social History     Socioeconomic History    Marital status: /Civil Union     Spouse name: Not on file    Number of children: Not on file    Years of education: Not on file    Highest education level: Not on file   Occupational History    Not on file   Social Needs    Financial resource strain: Not on file    Food insecurity     Worry: Not on file     Inability: Not on file   [employer] needs     Medical: Not on file     Non-medical: Not on file   Tobacco Use    Smoking status: Never Smoker    Smokeless tobacco: Never Used   Substance and Sexual Activity    Alcohol use: Yes    Drug use: Never    Sexual activity: Not on file   Lifestyle    Physical activity     Days per week: Not on file     Minutes per session: Not on file    Stress: Not on file   Relationships    Social connections     Talks on phone: Not on file     Gets together: Not on file     Attends Muslim service: Not on file     Active member of club or organization: Not on file     Attends meetings of clubs or organizations: Not on file     Relationship status: Not on file    Intimate partner violence     Fear of current or ex partner: Not on file     Emotionally abused: Not on file     Physically abused: Not on file     Forced sexual activity: Not on file   Other Topics Concern    Not on file   Social History Narrative    Not on file       Current Outpatient Medications:     traMADol (ULTRAM) 50 mg tablet, Take 1 tablet (50 mg total) by mouth every 6 (six) hours as needed for moderate pain, Disp: 10 tablet, Rfl: 0  No Known Allergies  Vitals:    02/05/21 1046   BP: 150/80   Pulse: 89   Resp: 16   Temp: 98 3 °F (36 8 °C)       Physical Exam  Skin:     Comments: Back incision clean dry intact  RUSSELL outputs minimal   Left axillary incision clean dry intact  Results:  Labs:  none    Imaging  Xr Chest Pa & Lateral    Result Date: 1/22/2021  Narrative: CHEST INDICATION:   C43 59: Malignant melanoma of other part of trunk  COMPARISON:  None EXAM PERFORMED/VIEWS:  XR CHEST PA & LATERAL FINDINGS: Cardiomediastinal silhouette appears unremarkable  The lungs are clear  No pneumothorax or pleural effusion  Osseous structures appear within normal limits for patient age  Impression: No acute cardiopulmonary disease  Workstation performed: [id number]     Nm Lymphatic Melanoma    Result Date: 2/2/2021  Narrative: SENTINEL NODE LYMPHOSCINTIGRAPHY INDICATION:   Upper back melanoma  FINDINGS: 0 5 mCi Tc-99m sulfur colloid (0 6 cc volume) was administered intradermally in divided doses by Dr Brinda Flores in the region of the patients left upper back melanoma  Scintigraphic images were obtained over the thorax in multiple projections  Two foci of sentinel bryan activity identified in the left axilla  Using scintigraphic guidance, the corresponding skin site was marked with an indelible marker  The patient was transferred to the operating room in satisfactory condition  Impression: 1  [place] lymph node localized to the left axilla  Workstation performed: [id number]     I reviewed the above laboratory and imaging data  Discussion/Summary:  78-year-old man status post left axillary sentinel node biopsy with excision of left back melanoma  DC drain today    Follow-up in 2 weeks for path  Review period

## 2021-02-11 ENCOUNTER — TELEPHONE (OUTPATIENT)
Dept: HEMATOLOGY ONCOLOGY | Facility: CLINIC | Age: 46
End: 2021-02-11

## 2021-02-11 NOTE — TELEPHONE ENCOUNTER
The patient's wife phoned and notes that the patient is concerned about the pathology results shown in [facility]  The best call back number is [phone]

## 2021-02-11 NOTE — PROGRESS NOTES
Called patient to discuss pathology report with him  When over margins status, depth of tumor, and status of sentinel nodes  Explained to him that he is stage III, and will need additional treatment, namely immunotherapy  No additional surgery is needed provided ultrasound of axilla does not reveal any more abnormal lymph nodes  He voiced understanding of our discussion

## 2021-02-22 ENCOUNTER — OFFICE VISIT (OUTPATIENT)
Dept: SURGICAL ONCOLOGY | Facility: CLINIC | Age: 46
End: 2021-02-22

## 2021-02-22 VITALS
WEIGHT: 200 LBS | HEIGHT: 68 IN | TEMPERATURE: 97.8 F | RESPIRATION RATE: 16 BRPM | HEART RATE: 97 BPM | DIASTOLIC BLOOD PRESSURE: 80 MMHG | SYSTOLIC BLOOD PRESSURE: 122 MMHG | BODY MASS INDEX: 30.31 KG/M2

## 2021-02-22 DIAGNOSIS — C43.59 MALIGNANT MELANOMA OF UPPER BACK (HCC): Primary | ICD-10-CM

## 2021-02-22 PROCEDURE — 99024 POSTOP FOLLOW-UP VISIT: CPT | Performed by: SURGERY

## 2021-02-22 NOTE — LETTER
February 22, 2021     Stalin Griffith MD PhD  [address] [address]    Patient: Jesenia Brock   YOB: 1975   Date of Visit: 2/22/2021       Dear Dr Uche Mondragon:    Thank you for referring Gustavo Hernandez to me for evaluation  Below are my notes for this consultation  If you have questions, please do not hesitate to call me  I look forward to following your patient along with you  Sincerely,        Joseph Hoskins MD        CC: No Recipients  Joseph Hoskins MD  2/22/2021 12:28 PM  Sign when Signing Visit     Surgical Oncology Follow Up       [address] [facility] [address] [address]  [phone]    Jesenia Brock  1975  [id number]  [address] [place] [address] SURGICAL ONCOLOGY Severiano Ashton  [address] [facility] [id number] [phone]    Chief Complaint   Patient presents with    Post-op    Wound Check       Assessment/Plan:    No problem-specific Assessment & Plan notes found for this encounter  Diagnoses and all orders for this visit:    Malignant melanoma of upper back [facility])  -     Ambulatory referral to Hematology / Oncology; Future  -     US extremity soft tissue; Future  -     Lactate dehydrogenase, isoenzymes; Future  -     XR chest pa & lateral; Future        Advance Care Planning/Advance Directives:  Discussed disease status, cancer treatment plans and/or cancer treatment goals with the patient  Oncology History   Malignant melanoma of upper back ([facility] [address] )   1/4/2020 Biopsy    Left upper back shave biopsy:  - Malignant melanoma 2 3mm    Mitosis: 6  Ulceration: not identified         History of Present Illness:   Patient is a 78-year-old man here for postop check     -Interval History:He underwent left axillary sentinel node biopsy with excision of back melanoma  He had portion of the wound opened up over last several days  No fevers or chills  No symptoms infection, though he did have serosanguineous drainage from the opening in the wound  Review of Systems:  Review of Systems   Constitutional: Negative  HENT: Negative  Eyes: Negative  Respiratory: Negative  Cardiovascular: Negative  Gastrointestinal: Negative  Endocrine: Negative  Genitourinary: Negative  Musculoskeletal: Negative  Skin: Positive for wound  Allergic/Immunologic: Negative  Neurological: Negative  Hematological: Negative  Psychiatric/Behavioral: Negative  All other systems reviewed and are negative  Patient Active Problem List   Diagnosis    Malignant melanoma of upper back ([address] )     No past medical history on file    Past Surgical History:   Procedure Laterality Date    LYMPH NODE BIOPSY N/A 2/2/2021    Procedure: BIOPSY LYMPH NODE LEFT AXILLARY SENTINEL;  Surgeon: Holly Parker MD;  [facility];  Service: Surgical Oncology    NO PAST SURGERIES      SKIN LESION EXCISION Left 2/2/2021    Procedure: WIDE EXCISION MELANOMA SCAR UPPER BACK, intraoperative lymphatic mapping, sentinel lymph node biopsy; [address];  Surgeon: Holly Parker MD;  [facility];  Service: Surgical Oncology     Family History   Problem Relation Age of Onset    Basal cell carcinoma Father     No Known Problems Mother     Breast cancer Paternal Aunt 43    Breast cancer Paternal Grandmother [de-identified]     Social History     Socioeconomic History    Marital status: /Civil Union     Spouse name: Not on file    Number of children: Not on file    Years of education: Not on file    Highest education level: Not on file   Occupational History    Not on file   Social Needs    Financial resource strain: Not on file    Food insecurity     Worry: Not on file     Inability: Not on file    Transportation needs     Medical: Not on file     Non-medical: Not on file   Tobacco Use    Smoking status: Never Smoker    Smokeless tobacco: Never Used Substance and Sexual Activity    Alcohol use: Yes    Drug use: Never    Sexual activity: Not on file   Lifestyle    Physical activity     Days per week: Not on file     Minutes per session: Not on file    Stress: Not on file   Relationships    Social connections     Talks on phone: Not on file     Gets together: Not on file     Attends Yazidism service: Not on file     Active member of club or organization: Not on file     Attends meetings of clubs or organizations: Not on file     Relationship status: Not on file    Intimate partner violence     Fear of current or ex partner: Not on file     Emotionally abused: Not on file     Physically abused: Not on file     Forced sexual activity: Not on file   Other Topics Concern    Not on file   Social History Narrative    Not on file     No current outpatient medications on file  No Known Allergies  Vitals:    02/22/21 1131   BP: 122/80   Pulse: 97   Resp: 16   Temp: 97 8 °F (36 6 °C)       Physical Exam  Skin:     Comments: Left axillary incision clean dry intact  Back incision closed except for portion in center which is open  Serous sanguinous fluid noted  Wound was packed after being probed with Q-tip  No surrounding cellulitis             Results:  Labs:    Case Report   Surgical Pathology Report                         Case: [id number]                                    Authorizing Provider: Carson Shaver MD        Collected:           02/02/2021 1547               Ordering Location:     [facility]      Received:            02/02/2021 2045                                      Hospital Operating Room                                                       Pathologist:           Avila Toro MD                                                                   Specimens:   A) - Lymph Node, [place], number 1                                                                  B) - Skin, Other, left back melanoma                                                                 C) - Lymph Node, [place], sentinel node #2                                                Final Diagnosis   A  Lymph Node, [place], number 1, biopsy:  - Metastatic melanoma in one lymph node (1/1, larger focus approximately 1 3 mm; subcapsular and intraparenchyma), no extranodal extension, see note      Note:  The tumor is also highlighted by S100, HMB45 and Mart-1 immunostains        B  Skin, left back, excision:  - Residual melanoma, 1 3 mm; prior biopsy site reaction   - Inked margins with no tumor seen         C  Lymph Node, [place], sentinel node #2, biopsy:  - Metastatic melanoma in one of ten lymph nodes (1/10, single and small nests of cells, subcapsular, larger focus 0 11 mm); no extranodal extension   - Tattoo pigment      Note:  The foci of tumor are highlighted by S100, HMB45 and Mart-1 immunostains         Interpretation performed at [facility], [address] [place] [address], [address]      Electronically signed by Trice Momin MD on 2/10/2021 at  6:31 PM       CBC, Coags, BMP, Mg, Phos     Imaging  Nm Lymphatic Melanoma    Result Date: 2/2/2021  Narrative: SENTINEL NODE LYMPHOSCINTIGRAPHY INDICATION:   Upper back melanoma  FINDINGS: 0 5 mCi Tc-99m sulfur colloid (0 6 cc volume) was administered intradermally in divided doses by Dr Carlos Pichardo in the region of the patients left upper back melanoma  Scintigraphic images were obtained over the thorax in multiple projections  Two foci of sentinel bryan activity identified in the left axilla  Using scintigraphic guidance, the corresponding skin site was marked with an indelible marker  The patient was transferred to the operating room in satisfactory condition  Impression: 1  [place] lymph node localized to the left axilla  Workstation performed: [id number]     I reviewed the above laboratory and imaging data      Discussion/Summary: Newly diagnosed stage III melanoma, left upper back, with 2 of 11 sentinel nodes positive  Plan on surveillance of left axilla with MSLT2 protocol ultrasound in 4 months  Given stage, we will refer to Medical Oncology to discuss adjuvant therapy  We will order surveillance scans and blood work for four-month visit, but in anticipation of medical oncology visit, will order CT scan of chest abdomen and pelvis to establish baseline  Regarding wound care, patient's  Incision was packed, with plans for daily packing and follow-up in 1 week to assess for condition of wound

## 2021-02-22 NOTE — PROGRESS NOTES
Surgical Oncology Follow Up       [facility] [place]  [address] [facility] [address] [phone]    Etha Heimlich  1975  [id number]  [address] SURGICAL ONCOLOGY Shaneka Jes  [address] [facility] [id number] [phone]    Chief Complaint   Patient presents with    Post-op    Wound Check       Assessment/Plan:    No problem-specific Assessment & Plan notes found for this encounter  Diagnoses and all orders for this visit:    Malignant melanoma of upper back [facility])  -     Ambulatory referral to Hematology / Oncology; Future  -     US extremity soft tissue; Future  -     Lactate dehydrogenase, isoenzymes; Future  -     XR chest pa & lateral; Future        Advance Care Planning/Advance Directives:  Discussed disease status, cancer treatment plans and/or cancer treatment goals with the patient  Oncology History   Malignant melanoma of upper back ([facility] [address] )   1/4/2020 Biopsy    Left upper back shave biopsy:  - Malignant melanoma 2 3mm    Mitosis: 6  Ulceration: not identified         History of Present Illness:   Patient is a 61-year-old man here for postop check     -Interval History:He underwent left axillary sentinel node biopsy with excision of back melanoma  He had portion of the wound opened up over last several days  No fevers or chills  No symptoms infection, though he did have serosanguineous drainage from the opening in the wound  Review of Systems:  Review of Systems   Constitutional: Negative  HENT: Negative  Eyes: Negative  Respiratory: Negative  Cardiovascular: Negative  Gastrointestinal: Negative  Endocrine: Negative  Genitourinary: Negative  Musculoskeletal: Negative  Skin: Positive for wound  Allergic/Immunologic: Negative  Neurological: Negative  Hematological: Negative  Psychiatric/Behavioral: Negative      All other systems reviewed and are negative  Patient Active Problem List   Diagnosis    Malignant melanoma of upper back ([facility] [address] )     No past medical history on file  Past Surgical History:   Procedure Laterality Date    LYMPH NODE BIOPSY N/A 2/2/2021    Procedure: BIOPSY LYMPH NODE LEFT AXILLARY SENTINEL;  Surgeon: Robby Corrales MD;  Location[facility];  Service: Surgical Oncology    NO PAST SURGERIES      SKIN LESION EXCISION Left 2/2/2021    Procedure: WIDE EXCISION MELANOMA SCAR UPPER BACK, intraoperative lymphatic mapping, sentinel lymph node biopsy; [address];  Surgeon: Robby Corrales MD;  Location[facility];  Service: Surgical Oncology     Family History   Problem Relation Age of Onset    Basal cell carcinoma Father     No Known Problems Mother     Breast cancer Paternal Aunt 43    Breast cancer Paternal Grandmother [de-identified]     Social History     Socioeconomic History    Marital status: /Civil Union     Spouse name: Not on file    Number of children: Not on file    Years of education: Not on file    Highest education level: Not on file   Occupational History    Not on file   Social Needs    Financial resource strain: Not on file    Food insecurity     Worry: Not on file     Inability: Not on file   [employer] needs     Medical: Not on file     Non-medical: Not on file   Tobacco Use    Smoking status: Never Smoker    Smokeless tobacco: Never Used   Substance and Sexual Activity    Alcohol use:  Yes    Drug use: Never    Sexual activity: Not on file   Lifestyle    Physical activity     Days per week: Not on file     Minutes per session: Not on file    Stress: Not on file   Relationships    Social connections     Talks on phone: Not on file     Gets together: Not on file     Attends Pentecostalism service: Not on file     Active member of club or organization: Not on file     Attends meetings of clubs or organizations: Not on file     Relationship status: Not on file    Intimate partner violence     Fear of current or ex partner: Not on file     Emotionally abused: Not on file     Physically abused: Not on file     Forced sexual activity: Not on file   Other Topics Concern    Not on file   Social History Narrative    Not on file     No current outpatient medications on file  No Known Allergies  Vitals:    02/22/21 1131   BP: 122/80   Pulse: 97   Resp: 16   Temp: 97 8 °F (36 6 °C)       Physical Exam  Skin:     Comments: Left axillary incision clean dry intact  Back incision closed except for portion in center which is open  Serous sanguinous fluid noted  Wound was packed after being probed with Q-tip  No surrounding cellulitis  Results:  Labs:    Case Report   Surgical Pathology Report                         Case: [id number]                                    Authorizing Provider: Royal Callie MD        Collected:           02/02/2021 1547               Ordering Location:     [address]      Received:            02/02/2021 2045                                      Hospital Operating Room                                                       Pathologist:           Patsy Morris MD                                                                   Specimens:   A) - Lymph Node, [place], number 1                                                                  B) - Skin, Other, left back melanoma                                                                 C) - Lymph Node, [place], sentinel node #2                                                Final Diagnosis   A  Lymph Node, [place], number 1, biopsy:  - Metastatic melanoma in one lymph node (1/1, larger focus approximately 1 3 mm; subcapsular and intraparenchyma), no extranodal extension, see note      Note:  The tumor is also highlighted by S100, HMB45 and Mart-1 immunostains        B  Skin, left back, excision:  - Residual melanoma, 1 3 mm; prior biopsy site reaction   - Inked margins with no tumor seen     C  Lymph Node, [place], sentinel node #2, biopsy:  - Metastatic melanoma in one of ten lymph nodes (1/10, single and small nests of cells, subcapsular, larger focus 0 11 mm); no extranodal extension   - Tattoo pigment      Note:  The foci of tumor are highlighted by S100, HMB45 and Mart-1 immunostains         Interpretation performed at [facility], [address] [place] [address], [address]      Electronically signed by Avila Toro MD on 2/10/2021 at  6:31 PM       CBC, Coags, BMP, Mg, Phos     Imaging  Nm Lymphatic Melanoma    Result Date: 2/2/2021  Narrative: SENTINEL NODE LYMPHOSCINTIGRAPHY INDICATION:   Upper back melanoma  FINDINGS: 0 5 mCi Tc-99m sulfur colloid (0 6 cc volume) was administered intradermally in divided doses by Dr Adarsh Curz in the region of the patients left upper back melanoma  Scintigraphic images were obtained over the thorax in multiple projections  Two foci of sentinel bryan activity identified in the left axilla  Using scintigraphic guidance, the corresponding skin site was marked with an indelible marker  The patient was transferred to the operating room in satisfactory condition  Impression: 1  [place] lymph node localized to the left axilla  Workstation performed: [id number]     I reviewed the above laboratory and imaging data  Discussion/Summary: Newly diagnosed stage III melanoma, left upper back, with 2 of 11 sentinel nodes positive  Plan on surveillance of left axilla with MSLT2 protocol ultrasound in 4 months  Given stage, we will refer to Medical Oncology to discuss adjuvant therapy  We will order surveillance scans and blood work for four-month visit, but in anticipation of medical oncology visit, will order CT scan of chest abdomen and pelvis to establish baseline  Regarding wound care, patient's  Incision was packed, with plans for daily packing and follow-up in 1 week to assess for condition of wound

## 2021-02-24 NOTE — TELEPHONE ENCOUNTER
Dr Dung Villela referring pt to Dr Julieth Cordoba re: melanoma  Pt scheduled for 3/9 at [facility]  He would prefer to have infusion if needed at [address] [place] [address] if possible

## 2021-02-26 ENCOUNTER — APPOINTMENT (OUTPATIENT)
Dept: LAB | Facility: HOSPITAL | Age: 46
End: 2021-02-26
Payer: COMMERCIAL

## 2021-02-26 ENCOUNTER — HOSPITAL ENCOUNTER (OUTPATIENT)
Dept: CT IMAGING | Facility: HOSPITAL | Age: 46
Discharge: HOME | End: 2021-02-26
Payer: COMMERCIAL

## 2021-02-26 DIAGNOSIS — C43.59 MALIGNANT MELANOMA OF UPPER BACK (HCC): ICD-10-CM

## 2021-02-26 PROCEDURE — G1004 CDSM NDSC: HCPCS

## 2021-02-26 PROCEDURE — 71260 CT THORAX DX C+: CPT

## 2021-02-26 PROCEDURE — 36415 COLL VENOUS BLD VENIPUNCTURE: CPT

## 2021-02-26 PROCEDURE — 83615 LACTATE (LD) (LDH) ENZYME: CPT

## 2021-02-26 PROCEDURE — 83625 ASSAY OF LDH ENZYMES: CPT

## 2021-02-26 PROCEDURE — 74177 CT ABD & PELVIS W/CONTRAST: CPT

## 2021-02-26 RX ADMIN — IOHEXOL 100 ML: 350 INJECTION, SOLUTION INTRAVENOUS at 14:42

## 2021-03-01 LAB
LDH SERPL-CCNC: 200 IU/L (ref 121–224)
LDH1 CFR SERPL ELPH: 21 % (ref 17–32)
LDH2 CFR SERPL ELPH: 32 % (ref 25–40)
LDH3 CFR SERPL ELPH: 22 % (ref 17–27)
LDH4 CFR SERPL ELPH: 10 % (ref 5–13)
LDH5 CFR SERPL ELPH: 15 % (ref 4–20)

## 2021-03-03 ENCOUNTER — OFFICE VISIT (OUTPATIENT)
Dept: SURGICAL ONCOLOGY | Facility: CLINIC | Age: 46
End: 2021-03-03

## 2021-03-03 VITALS
SYSTOLIC BLOOD PRESSURE: 160 MMHG | RESPIRATION RATE: 16 BRPM | WEIGHT: 200 LBS | TEMPERATURE: 98.2 F | DIASTOLIC BLOOD PRESSURE: 80 MMHG | BODY MASS INDEX: 30.31 KG/M2 | HEART RATE: 96 BPM | HEIGHT: 68 IN

## 2021-03-03 DIAGNOSIS — C43.59 MALIGNANT MELANOMA OF UPPER BACK (HCC): Primary | ICD-10-CM

## 2021-03-03 PROCEDURE — 99024 POSTOP FOLLOW-UP VISIT: CPT | Performed by: SURGERY

## 2021-03-03 NOTE — PROGRESS NOTES
Surgical Oncology Follow Up       [facility] SURGICAL ONCOLOGY [place]  [facility] [id number]    Anna Luisa  1975  [id number]  [facility] SURGICAL ONCOLOGY [facility] [id number]    Chief Complaint   Patient presents with    Follow-up     1 week follow up       Assessment/Plan:    No problem-specific Assessment & Plan notes found for this encounter  Diagnoses and all orders for this visit:    Malignant melanoma of upper back [facility])        Advance Care Planning/Advance Directives:  Discussed disease status, cancer treatment plans and/or cancer treatment goals with the patient  Oncology History   Malignant melanoma of upper back ([facility] [address] )   1/4/2020 Biopsy    Left upper back shave biopsy:  - Malignant melanoma 2 3mm    Mitosis: 6  Ulceration: not identified     2/2/2021 Surgery    Wide excision with sentinel lymph node biopsy         History of Present Illness: Patient is a 18-year-old man here for wound check  He is status post excision of back melanoma with sentinel node biopsy  Since his visit last week, the wound has opened up   -Interval History:  He has been doing daily wound  Packing  No fever chills or signs of infection  Review of Systems:  Review of Systems   Constitutional: Negative  HENT: Negative  Eyes: Negative  Respiratory: Negative  Cardiovascular: Negative  Gastrointestinal: Negative  Endocrine: Negative  Genitourinary: Negative  Musculoskeletal: Negative  Skin: Positive for wound  Allergic/Immunologic: Negative  Neurological: Negative  Hematological: Negative  Psychiatric/Behavioral: Negative  Patient Active Problem List   Diagnosis    Malignant melanoma of upper back ([facility] [address] )     No past medical history on file    Past Surgical History:   Procedure Laterality Date    LYMPH NODE BIOPSY N/A 2/2/2021    Procedure: BIOPSY LYMPH NODE LEFT AXILLARY SENTINEL;  Surgeon: Blake Ramirez MD;  Location: [facility];  Service: Surgical Oncology    NO PAST SURGERIES      SKIN LESION EXCISION Left 2/2/2021    Procedure: WIDE EXCISION MELANOMA SCAR UPPER BACK, intraoperative lymphatic mapping, sentinel lymph node biopsy; [address];  Surgeon: Blake Ramirez MD;  [facility]: [facility];  Service: Surgical Oncology     Family History   Problem Relation Age of Onset    Basal cell carcinoma Father     No Known Problems Mother     Breast cancer Paternal Aunt 43    Breast cancer Paternal Grandmother [de-identified]     Social History     Socioeconomic History    Marital status: /Civil Union     Spouse name: Not on file    Number of children: Not on file    Years of education: Not on file    Highest education level: Not on file   Occupational History    Not on file   Social Needs    Financial resource strain: Not on file    Food insecurity     Worry: Not on file     Inability: Not on file   [employer] needs     Medical: Not on file     Non-medical: Not on file   Tobacco Use    Smoking status: Never Smoker    Smokeless tobacco: Never Used   Substance and Sexual Activity    Alcohol use:  Yes    Drug use: Never    Sexual activity: Not on file   Lifestyle    Physical activity     Days per week: Not on file     Minutes per session: Not on file    Stress: Not on file   Relationships    Social connections     Talks on phone: Not on file     Gets together: Not on file     Attends Spiritism service: Not on file     Active member of club or organization: Not on file     Attends meetings of clubs or organizations: Not on file     Relationship status: Not on file    Intimate partner violence     Fear of current or ex partner: Not on file     Emotionally abused: Not on file     Physically abused: Not on file     Forced sexual activity: Not on file   Other Topics Concern    Not on file   Social History Narrative    Not on file     No current outpatient medications on file  No Known Allergies  Vitals:    03/03/21 0900   BP: 160/80   Pulse: 96   Resp: 16   Temp: 98 2 °F (36 8 °C)       Physical Exam  Skin:     Comments: Back wound open, granulating  No signs or symptoms of infection  Results:  Labs:  none  Imaging  Ct Chest Abdomen Pelvis W Contrast    Result Date: 2/26/2021  Narrative: CT CHEST, ABDOMEN AND PELVIS WITH IV CONTRAST INDICATION:   C43 59: Malignant melanoma of other part of trunk  COMPARISON:  None  TECHNIQUE: CT examination of the chest, abdomen and pelvis was performed  In addition to portal venous phase postcontrast scanning through the abdomen and pelvis, delayed phase postcontrast scanning was performed through the upper abdominal viscera  Axial, sagittal, and coronal 2D reformatted images were created from the source data and submitted for interpretation  Radiation dose length product (DLP) for this visit:  1016 9 mGy-cm   This examination, like all CT scans performed in the [facility], was performed utilizing techniques to minimize radiation dose exposure, including the use of iterative  reconstruction and automated exposure control  IV Contrast:  100 mL of iohexol (OMNIPAQUE) Enteric Contrast: Enteric contrast was administered  FINDINGS: CHEST LUNGS:  Lungs are clear  There is no tracheal or endobronchial lesion  PLEURA:  Unremarkable  HEART/GREAT VESSELS:  Unremarkable for patient's age  MEDIASTINUM AND MAGALYS:  Unremarkable  CHEST WALL AND LOWER NECK:  Soft tissue defect containing packing material in the left upper back, consistent with recent melanoma resection  No residual mass or masslike enhancement in the resection bed  Surgical changes in the left axilla  ABDOMEN LIVER/BILIARY TREE:  One or more subcentimeter sharply circumscribed low-density hepatic lesion(s) are noted, too small to accurately characterize, but statistically most likely to represent subcentimeter hepatic cysts    No suspicious solid hepatic lesion is identified  Hepatic contours are normal   No biliary dilatation  GALLBLADDER:  No calcified gallstones  No pericholecystic inflammatory change  SPLEEN:  Unremarkable  PANCREAS:  Unremarkable  ADRENAL GLANDS:  Unremarkable  KIDNEYS/URETERS:  One or more simple renal cyst(s) is noted  Otherwise unremarkable kidneys  No hydronephrosis  STOMACH AND BOWEL:  No bowel obstruction  There is colonic diverticulosis without evidence of acute diverticulitis  APPENDIX:  Normal  ABDOMINOPELVIC CAVITY:  No ascites  No pneumoperitoneum  No lymphadenopathy  VESSELS:  Unremarkable for patient's age  PELVIS REPRODUCTIVE ORGANS:  Unremarkable for patient's age  URINARY BLADDER:  Unremarkable  ABDOMINAL WALL/INGUINAL REGIONS:  Unremarkable  OSSEOUS STRUCTURES:  No acute fracture or destructive osseous lesion  Chronic bilateral L3 spondylolysis without spondylolisthesis  Impression: No evidence of metastatic disease in the chest, abdomen, or pelvis  Surgical changes in the left upper back soft tissues and left axilla  Workstation performed: [id number]     Nm Lymphatic Melanoma    Result Date: 2/2/2021  Narrative: SENTINEL NODE LYMPHOSCINTIGRAPHY INDICATION:   Upper back melanoma  FINDINGS: 0 5 mCi Tc-99m sulfur colloid (0 6 cc volume) was administered intradermally in divided doses by Dr Johnny Rodriguez in the region of the patients left upper back melanoma  Scintigraphic images were obtained over the thorax in multiple projections  Two foci of sentinel bryan activity identified in the left axilla  Using scintigraphic guidance, the corresponding skin site was marked with an indelible marker  The patient was transferred to the operating room in satisfactory condition  Impression: 1  McCool lymph node localized to the left axilla  Workstation performed: [id number]     I reviewed the above laboratory and imaging data  Discussion/Summary:  43-year-old man, history of melanoma, open wound  Continue wound packing  Follow-up in 3 weeks  To assess healing

## 2021-03-09 ENCOUNTER — CONSULT (OUTPATIENT)
Dept: HEMATOLOGY ONCOLOGY | Facility: CLINIC | Age: 46
End: 2021-03-09
Payer: COMMERCIAL

## 2021-03-09 VITALS
BODY MASS INDEX: 30.77 KG/M2 | DIASTOLIC BLOOD PRESSURE: 80 MMHG | HEART RATE: 78 BPM | WEIGHT: 203 LBS | HEIGHT: 68 IN | SYSTOLIC BLOOD PRESSURE: 131 MMHG | RESPIRATION RATE: 18 BRPM | TEMPERATURE: 97.6 F | OXYGEN SATURATION: 99 %

## 2021-03-09 DIAGNOSIS — C43.59 MALIGNANT MELANOMA OF UPPER BACK (HCC): ICD-10-CM

## 2021-03-09 PROCEDURE — 99243 OFF/OP CNSLTJ NEW/EST LOW 30: CPT | Performed by: INTERNAL MEDICINE

## 2021-03-09 NOTE — PROGRESS NOTES
HEMATOLOGY / ONCOLOGY CLINIC NOTE    Primary Care Provider: No primary care provider on file  Referring Provider: Keith Stone  MRN: [id number]  : 1975    Reason for Encounter:  Chief Complaint   Patient presents with    Consult         History of Hematology / Oncology Illness:     Floyd Noonan is a 50 y o  male who came in  to establish care with oncology      1, cutaneous melanoma, T3a N2a, stage IIIb, located on patient's upper back, with sentinel lymph node involvement  - diagnosed in 2021 status post wide excision with sentinel lymph node biopsy on 2021   - 2021, CT scan chest abdomen pelvic did not show any evidence for metastatic disease  Assessment / Plan:       1  Malignant melanoma of upper back ([facility] [address] )  - had a long discussion with patient regarding the diagnosis, prognosis, risks and benefit for each option  Made patient aware that there is at least 30% chance that the melanoma could reoccur in the next 5 years  Recommend patient to consider adjuvant treatment, options include immunotherapy versus oral TKI  We decided to proceed with B Yuri gene mutation testing, will follow patient in 2 weeks to make a final decision         Made patient aware regarding side effects of chemotherapy, including but not limited to fatigue cytopenia, increased risk for infection, potential kidney, liver injuries neuropathies et al    Made patient aware to call MD or go to ED for any fever,  Chills, bleeding, easy bruise, unhealed wound, chest pain, abdominal pain et al                       minutes were spent face to face with patient with greater than 50% of the time spent in counseling or coordination of care including discussions of treatment instructions  All of the patient's questions were answered to their satisfactory during this discussion  Advised pt to call if there is any further questions           Interval History:     3/9/2021:  28-year-old male generally healthy, on routine dermatologist evaluation noticed a skin lesion status post biopsy showed melanoma, patient was then referred to surgical oncologist status post wide excision and sentinel lymph node biopsy, patient was found having lymph node involvement, referred to Medical Oncology for further evaluation    As for now patient's wound is not completely healed yet, otherwise patient feeling okay, no subjective palpable lymph nodes, no new skin lesion  Body weight stable no other constitutional symptoms  Patient has no other malignancy in the past, no strong family history of malignancy  Father had history of skin basal cell carcinoma  Patient is a nonsmoker do not drink alcohol regular basis, actively  in a [employer]  ECOG 0  Problem list:     Patient Active Problem List   Diagnosis    Malignant melanoma of upper back ([address] )         PHYSICIAL EXAMINATION:     Vital Signs:   /80 (BP Location: Left arm, Patient Position: Sitting, Cuff Size: Large)   Pulse 78   Temp 97 6 °F (36 4 °C)   Resp 18   Ht 5' 8" (1 727 m)   Wt 92 1 kg (203 lb)   SpO2 99%   BMI 30 87 kg/m²   Body surface area is 2 06 meters squared  Ht Readings from Last 3 Encounters:   03/09/21 5' 8" (1 727 m)   03/03/21 5' 8" (1 727 m)   02/22/21 5' 8" (1 727 m)       Wt Readings from Last 3 Encounters:   03/09/21 92 1 kg (203 lb)   03/03/21 90 7 kg (200 lb)   02/22/21 90 7 kg (200 lb)        Temp Readings from Last 3 Encounters:   03/09/21 97 6 °F (36 4 °C)   03/03/21 98 2 °F (36 8 °C)   02/22/21 97 8 °F (36 6 °C)        BP Readings from Last 3 Encounters:   03/09/21 131/80   03/03/21 160/80   02/22/21 122/80         Pulse Readings from Last 3 Encounters:   03/09/21 78   03/03/21 96   02/22/21 97       Incision marks noticed on his back, probably gauzed    no palpable lymphadenopathy      GEN: Alert, awake oriented x3, in no acute distress  HEENT- No pallor, icterus, cyanosis, no oral mucosal lesions,   LAD - no palpable cervical, clavicle, axillary, inguinal LAD  Heart- normal S1 S2, regular rate and rhythm, No murmur, rubs  Lungs- decreased breathing sound bilateral    Abdomen- soft, Non tender, bowel sounds present  Extremities- No cyanosis, clubbing, edema  Neuro- No focal neurological deficit           PAST MEDICAL HISTORY:   has no past medical history on file  PAST SURGICAL HISTORY:   has a past surgical history that includes No past surgeries; Skin lesion excision (Left, 2/2/2021); and Lymph node biopsy (N/A, 2/2/2021)  CURRENT MEDICATIONS:   No current outpatient medications on file  No current facility-administered medications for this visit  [unfilled]    [employer] HISTORY:   reports that he has never smoked  He has never used smokeless tobacco  He reports current alcohol use  He reports that he does not use drugs  FAMILY HISTORY:  family history includes Basal cell carcinoma in his father; Breast cancer (age of onset: 43) in his paternal aunt; Breast cancer (age of onset: [de-identified]) in his paternal grandmother; No Known Problems in his mother  ALLERGIES:  has No Known Allergies  REVIEW OF SYSTEMS:  Please note that a 14-point review of systems was performed to include Constitutional, HEENT, Respiratory, CVS, GI, , Musculoskeletal, Integumentary, Neurologic, Rheumatologic, Endocrinologic, Psychiatric, Lymphatic, and Hematologic/Oncologic systems were reviewed and are negative unless otherwise stated in HPI  Positive and negative findings pertinent to this evaluation are incorporated into the history of present illness              LAB:  Lab Results   Component Value Date    WBC 5 20 01/20/2021    HGB 15 5 01/20/2021    HCT 46 4 01/20/2021    MCV 94 01/20/2021     01/20/2021     Lab Results   Component Value Date    SODIUM 138 01/20/2021    K 3 9 01/20/2021     01/20/2021    CO2 25 01/20/2021    AGAP 8 01/20/2021    BUN 17 01/20/2021    CREATININE 1 17 01/20/2021    GLUF 115 (H) 01/20/2021    CALCIUM 9 1 01/20/2021    AST 43 (H) 01/20/2021    ALT 77 (H) 01/20/2021    ALKPHOS 43 01/20/2021    TP 7 2 01/20/2021    TBILI 0 60 01/20/2021    EGFR 74 01/20/2021       CBC with diff:       Invalid input(s):  RBC, TOTALCELLSCOUNTED, SEGS%, GRANS%, LYMPHS%, EOS%, BASO%, ABNEUT, ABGRANS, ABLYMPHS, ABMOMOS, ABEOS, ABBASO    CMP:      Invalid input(s): ALBUMIN    IMAGING:  No orders to display     Ct Chest Abdomen Pelvis W Contrast    Result Date: 2/26/2021  Narrative: CT CHEST, ABDOMEN AND PELVIS WITH IV CONTRAST INDICATION:   C43 59: Malignant melanoma of other part of trunk  COMPARISON:  None  TECHNIQUE: CT examination of the chest, abdomen and pelvis was performed  In addition to portal venous phase postcontrast scanning through the abdomen and pelvis, delayed phase postcontrast scanning was performed through the upper abdominal viscera  Axial, sagittal, and coronal 2D reformatted images were created from the source data and submitted for interpretation  Radiation dose length product (DLP) for this visit:  1016 9 mGy-cm   This examination, like all CT scans performed in the [facility], was performed utilizing techniques to minimize radiation dose exposure, including the use of iterative  reconstruction and automated exposure control  IV Contrast:  100 mL of iohexol (OMNIPAQUE) Enteric Contrast: Enteric contrast was administered  FINDINGS: CHEST LUNGS:  Lungs are clear  There is no tracheal or endobronchial lesion  PLEURA:  Unremarkable  HEART/GREAT VESSELS:  Unremarkable for patient's age  MEDIASTINUM AND MAGALYS:  Unremarkable  CHEST WALL AND LOWER NECK:  Soft tissue defect containing packing material in the left upper back, consistent with recent melanoma resection  No residual mass or masslike enhancement in the resection bed  Surgical changes in the left axilla   ABDOMEN LIVER/BILIARY TREE:  One or more subcentimeter sharply circumscribed low-density hepatic lesion(s) are noted, too small to accurately characterize, but statistically most likely to represent subcentimeter hepatic cysts  No suspicious solid hepatic lesion is identified  Hepatic contours are normal   No biliary dilatation  GALLBLADDER:  No calcified gallstones  No pericholecystic inflammatory change  SPLEEN:  Unremarkable  PANCREAS:  Unremarkable  ADRENAL GLANDS:  Unremarkable  KIDNEYS/URETERS:  One or more simple renal cyst(s) is noted  Otherwise unremarkable kidneys  No hydronephrosis  STOMACH AND BOWEL:  No bowel obstruction  There is colonic diverticulosis without evidence of acute diverticulitis  APPENDIX:  Normal  ABDOMINOPELVIC CAVITY:  No ascites  No pneumoperitoneum  No lymphadenopathy  VESSELS:  Unremarkable for patient's age  PELVIS REPRODUCTIVE ORGANS:  Unremarkable for patient's age  URINARY BLADDER:  Unremarkable  ABDOMINAL WALL/INGUINAL REGIONS:  Unremarkable  OSSEOUS STRUCTURES:  No acute fracture or destructive osseous lesion  Chronic bilateral L3 spondylolysis without spondylolisthesis  Impression: No evidence of metastatic disease in the chest, abdomen, or pelvis  Surgical changes in the left upper back soft tissues and left axilla   Workstation performed: [id number]

## 2021-03-23 ENCOUNTER — DOCUMENTATION (OUTPATIENT)
Dept: HEMATOLOGY ONCOLOGY | Facility: CLINIC | Age: 46
End: 2021-03-23

## 2021-03-23 ENCOUNTER — OFFICE VISIT (OUTPATIENT)
Dept: HEMATOLOGY ONCOLOGY | Facility: CLINIC | Age: 46
End: 2021-03-23
Payer: COMMERCIAL

## 2021-03-23 VITALS
DIASTOLIC BLOOD PRESSURE: 89 MMHG | TEMPERATURE: 97.6 F | RESPIRATION RATE: 18 BRPM | WEIGHT: 207 LBS | SYSTOLIC BLOOD PRESSURE: 141 MMHG | BODY MASS INDEX: 31.37 KG/M2 | HEART RATE: 90 BPM | HEIGHT: 68 IN

## 2021-03-23 DIAGNOSIS — C43.59 MALIGNANT MELANOMA OF UPPER BACK (HCC): Primary | ICD-10-CM

## 2021-03-23 PROCEDURE — 99214 OFFICE O/P EST MOD 30 MIN: CPT | Performed by: INTERNAL MEDICINE

## 2021-03-23 NOTE — PROGRESS NOTES
Performed chemotherapy teaching with patient on Tafinlar 150 mg BID and Mekinist 2 mg daily  I discussed that these are specialty medications, so they will come from a specialty pharmacy that will ship the medications directly to his home  Patient is aware the the financial team as well as pharmacy team will be reaching out to patient to discuss shipment and financials  We discussed potential side effects, one of the main ones being fever  I explained he should call us with a fever over 100 4 and that he should make sure he has a good thermometer at home to monitor this  Patient verbalized understanding  I explained that depending on the severity of fevers/ other side effects that medication could be held/ dose reduced  We went over further side effects and how to manage them, and educational handouts were supplied for patient  Patient signed chemotherapy consent  Patient is aware to call the office if he does not hear anything from us, finance or the pharmacy within 2 weeks to follow up on his medication status  E-mail sent to oral chemotherapy team to start financial process

## 2021-03-23 NOTE — PROGRESS NOTES
HEMATOLOGY / ONCOLOGY CLINIC NOTE    Primary Care Provider: No primary care provider on file  Referring Provider:    MRN: [id number]  : 1975    Reason for Encounter:    Chief Complaint   Patient presents with    Follow-up         History of Hematology / Oncology Illness:     Mt Garber is a 50 y o  male who came in  to establish care with oncology      1, cutaneous melanoma, T3a N2a, stage IIIb, located on patient's upper back, with sentinel lymph node involvement / BRaf [id number] mut     - diagnosed in 2021 status post wide excision with sentinel lymph node biopsy on 2021   - 2021, CT scan chest abdomen pelvic did not show any evidence for metastatic disease   - 3/2021:  Given mutations positive for 600arg, we decided to proceed with oral TKI with Tafinalar 150 mg b i d  Mekinist 2 mg p o  Daily  Assessment / Plan:       1  Malignant melanoma of upper back ([facility] [address] )  -  discussed with patient regarding the B Yuri finding, discussed again with patient regarding the 2 options immunotherapy versus TKI, given patient very young, concerning about the long-term irreversible toxicities from immunotherapy, decided to start with TKI  Made patient aware about the potential risks and benefit, toxicities including but not limited to fatigue the fevers potential kidney liver injuries etcetera, patient signed consent plan to proceed  - patient will wait until incision on his back is completely healed then start treatment  Patient have labs in 5 weeks;  follow patient after        Made patient aware regarding side effects of chemotherapy, including but not limited to fatigue cytopenia, increased risk for infection, potential kidney, liver injuries neuropathies et al    Made patient aware to call MD or go to ED for any fever,  Chills, bleeding, easy bruise, unhealed wound, chest pain, abdominal pain et al                       minutes were spent face to face with patient with greater than 50% of the time spent in counseling or coordination of care including discussions of treatment instructions  All of the patient's questions were answered to their satisfactory during this discussion  Advised pt to call if there is any further questions  Interval History:     3/9/2021:  22-year-old male generally healthy, on routine dermatologist evaluation noticed a skin lesion status post biopsy showed melanoma, patient was then referred to surgical oncologist status post wide excision and sentinel lymph node biopsy, patient was found having lymph node involvement, referred to Medical Oncology for further evaluation    As for now patient's wound is not completely healed yet, otherwise patient feeling okay, no subjective palpable lymph nodes, no new skin lesion  Body weight stable no other constitutional symptoms  Patient has no other malignancy in the past, no strong family history of malignancy  Father had history of skin basal cell carcinoma  Patient is a nonsmoker do not drink alcohol regular basis, actively  in a [employer]  ECOG 0       3/23/2021 :  Patient reported doing well no new lumps bumps or any skin lesion  Incision on his back is active healing  Problem list:       Patient Active Problem List   Diagnosis    Malignant melanoma of upper back ([facility] [address] )         PHYSICIAL EXAMINATION:     Vital Signs:   /89 (BP Location: Left arm, Patient Position: Sitting, Cuff Size: Adult)   Pulse 90   Temp 97 6 °F (36 4 °C)   Resp 18   Ht 5' 8" (1 727 m)   Wt 93 9 kg (207 lb)   BMI 31 47 kg/m²   Body surface area is 2 07 meters squared     Ht Readings from Last 3 Encounters:   03/23/21 5' 8" (1 727 m)   03/09/21 5' 8" (1 727 m)   03/03/21 5' 8" (1 727 m)       Wt Readings from Last 3 Encounters:   03/23/21 93 9 kg (207 lb)   03/09/21 92 1 kg (203 lb)   03/03/21 90 7 kg (200 lb)        Temp Readings from Last 3 Encounters:   03/23/21 97 6 °F (36 4 °C)   03/09/21 97 6 °F (36 4 °C)   03/03/21 98 2 °F (36 8 °C)        BP Readings from Last 3 Encounters:   03/23/21 141/89   03/09/21 131/80   03/03/21 160/80         Pulse Readings from Last 3 Encounters:   03/23/21 90   03/09/21 78   03/03/21 96       Incision marks noticed on his back, probably gauzed  no palpable lymphadenopathy; no new findings compared to previous visit  GEN: Alert, awake oriented x3, in no acute distress  HEENT- No pallor, icterus, cyanosis, no oral mucosal lesions,   LAD - no palpable cervical, clavicle, axillary, inguinal LAD  Heart- normal S1 S2, regular rate and rhythm, No murmur, rubs  Lungs- decreased breathing sound bilateral    Abdomen- soft, Non tender, bowel sounds present  Extremities- No cyanosis, clubbing, edema  Neuro- No focal neurological deficit           PAST MEDICAL HISTORY:   has no past medical history on file  PAST SURGICAL HISTORY:   has a past surgical history that includes No past surgeries; Skin lesion excision (Left, 2/2/2021); and Lymph node biopsy (N/A, 2/2/2021)  CURRENT MEDICATIONS:     No current outpatient medications on file  No current facility-administered medications for this visit  [unfilled]    SOCIAL HISTORY:   reports that he has never smoked  He has never used smokeless tobacco  He reports current alcohol use  He reports that he does not use drugs  FAMILY HISTORY:  family history includes Basal cell carcinoma in his father; Breast cancer (age of onset: 43) in his paternal aunt; Breast cancer (age of onset: [de-identified]) in his paternal grandmother; No Known Problems in his mother  ALLERGIES:  has No Known Allergies  REVIEW OF SYSTEMS:  Please note that a 14-point review of systems was performed to include Constitutional, HEENT, Respiratory, CVS, GI, , Musculoskeletal, Integumentary, Neurologic, Rheumatologic, Endocrinologic, Psychiatric, Lymphatic, and Hematologic/Oncologic systems were reviewed and are negative unless otherwise stated in HPI   Positive and negative findings pertinent to this evaluation are incorporated into the history of present illness  LAB:    Lab Results   Component Value Date    WBC 5 20 01/20/2021    HGB 15 5 01/20/2021    HCT 46 4 01/20/2021    MCV 94 01/20/2021     01/20/2021       Lab Results   Component Value Date    SODIUM 138 01/20/2021    K 3 9 01/20/2021     01/20/2021    CO2 25 01/20/2021    AGAP 8 01/20/2021    BUN 17 01/20/2021    CREATININE 1 17 01/20/2021    GLUF 115 (H) 01/20/2021    CALCIUM 9 1 01/20/2021    AST 43 (H) 01/20/2021    ALT 77 (H) 01/20/2021    ALKPHOS 43 01/20/2021    TP 7 2 01/20/2021    TBILI 0 60 01/20/2021    EGFR 74 01/20/2021       CBC with diff:       Invalid input(s):  RBC, TOTALCELLSCOUNTED, SEGS%, GRANS%, LYMPHS%, EOS%, BASO%, ABNEUT, ABGRANS, ABLYMPHS, ABMOMOS, ABEOS, ABBASO    CMP:      Invalid input(s): ALBUMIN    IMAGING:    No orders to display     Ct Chest Abdomen Pelvis W Contrast    Result Date: 2/26/2021  Narrative: CT CHEST, ABDOMEN AND PELVIS WITH IV CONTRAST INDICATION:   C43 59: Malignant melanoma of other part of trunk  COMPARISON:  None  TECHNIQUE: CT examination of the chest, abdomen and pelvis was performed  In addition to portal venous phase postcontrast scanning through the abdomen and pelvis, delayed phase postcontrast scanning was performed through the upper abdominal viscera  Axial, sagittal, and coronal 2D reformatted images were created from the source data and submitted for interpretation  Radiation dose length product (DLP) for this visit:  1016 9 mGy-cm   This examination, like all CT scans performed in the [facility], was performed utilizing techniques to minimize radiation dose exposure, including the use of iterative  reconstruction and automated exposure control  IV Contrast:  100 mL of iohexol (OMNIPAQUE) Enteric Contrast: Enteric contrast was administered  FINDINGS: CHEST LUNGS:  Lungs are clear    There is no tracheal or endobronchial lesion  PLEURA:  Unremarkable  HEART/GREAT VESSELS:  Unremarkable for patient's age  MEDIASTINUM AND MAGALYS:  Unremarkable  CHEST WALL AND LOWER NECK:  Soft tissue defect containing packing material in the left upper back, consistent with recent melanoma resection  No residual mass or masslike enhancement in the resection bed  Surgical changes in the left axilla  ABDOMEN LIVER/BILIARY TREE:  One or more subcentimeter sharply circumscribed low-density hepatic lesion(s) are noted, too small to accurately characterize, but statistically most likely to represent subcentimeter hepatic cysts  No suspicious solid hepatic lesion is identified  Hepatic contours are normal   No biliary dilatation  GALLBLADDER:  No calcified gallstones  No pericholecystic inflammatory change  SPLEEN:  Unremarkable  PANCREAS:  Unremarkable  ADRENAL GLANDS:  Unremarkable  KIDNEYS/URETERS:  One or more simple renal cyst(s) is noted  Otherwise unremarkable kidneys  No hydronephrosis  STOMACH AND BOWEL:  No bowel obstruction  There is colonic diverticulosis without evidence of acute diverticulitis  APPENDIX:  Normal  ABDOMINOPELVIC CAVITY:  No ascites  No pneumoperitoneum  No lymphadenopathy  VESSELS:  Unremarkable for patient's age  PELVIS REPRODUCTIVE ORGANS:  Unremarkable for patient's age  URINARY BLADDER:  Unremarkable  ABDOMINAL WALL/INGUINAL REGIONS:  Unremarkable  OSSEOUS STRUCTURES:  No acute fracture or destructive osseous lesion  Chronic bilateral L3 spondylolysis without spondylolisthesis  Impression: No evidence of metastatic disease in the chest, abdomen, or pelvis  Surgical changes in the left upper back soft tissues and left axilla   Workstation performed: [id number]

## 2021-03-24 ENCOUNTER — DOCUMENTATION (OUTPATIENT)
Dept: HEMATOLOGY ONCOLOGY | Facility: CLINIC | Age: 46
End: 2021-03-24

## 2021-03-24 NOTE — PROGRESS NOTES
3-23-21  Received new oral chemo gris- Darby Hernandez / ROYA    Auth needed    3-24-21  For patient Italia Claire  [birth year]  Patient has been enrolled with [employer] Universal Copay Card  Patient has [address] so I do not think funding will be needed but just to be safe Bigg  ID# [id number]  BIN#  [id number]  PCN# [id number]  GRP#  [id number]    Roya  ID#  [id number]  BIN#  [id number]  PCN#  [id number]  GRP#  [id number]    Patient pays $25 copay with a $15,000 limit per year      Epic noted, Email to team

## 2021-03-25 ENCOUNTER — DOCUMENTATION (OUTPATIENT)
Dept: HEMATOLOGY ONCOLOGY | Facility: CLINIC | Age: 46
End: 2021-03-25

## 2021-03-25 NOTE — PROGRESS NOTES
Received request for Tafinlar 150 mg BID and Mekinist 2 MG daily  Scarlett OLIVEIRAO  Tafinlar 150mg [address]    CaseId:[id number];Status:Approved; Review Type:Prior Auth; Coverage Start Date:02/23/2021; Coverage End Date:03/24/2024;     Mekinist 2mg DAILY  KEY [id number]    [facility] INSURANCE (SECONDARY)  Tafinlar 150 mg BID   KEY [id number] - ALL CLINICALS FAXED  [phone] (PHONE)  [phone] (FAX)    Mekinist 2 MG Daily

## 2021-03-26 ENCOUNTER — OFFICE VISIT (OUTPATIENT)
Dept: SURGICAL ONCOLOGY | Facility: CLINIC | Age: 46
End: 2021-03-26

## 2021-03-26 VITALS
RESPIRATION RATE: 16 BRPM | DIASTOLIC BLOOD PRESSURE: 90 MMHG | HEIGHT: 68 IN | SYSTOLIC BLOOD PRESSURE: 130 MMHG | BODY MASS INDEX: 31.37 KG/M2 | WEIGHT: 207 LBS | HEART RATE: 101 BPM | TEMPERATURE: 98.7 F

## 2021-03-26 DIAGNOSIS — C43.59 MALIGNANT MELANOMA OF UPPER BACK (HCC): Primary | ICD-10-CM

## 2021-03-26 PROCEDURE — 99024 POSTOP FOLLOW-UP VISIT: CPT | Performed by: SURGERY

## 2021-03-26 NOTE — PROGRESS NOTES
Surgical Oncology Follow Up       [facility] SURGICAL ONCOLOGY [place]  [facility] [address]    Arielle Glass  1975  [id number]  [facility] SURGICAL ONCOLOGY [facility] [address]    Chief Complaint   Patient presents with    Follow-up    Wound Check       Assessment/Plan:    No problem-specific Assessment & Plan notes found for this encounter  Diagnoses and all orders for this visit:    Malignant melanoma of upper back [facility])        Advance Care Planning/Advance Directives:  Discussed disease status, cancer treatment plans and/or cancer treatment goals with the patient  Oncology History   Malignant melanoma of upper back ([facility] )   1/4/2020 Biopsy    Left upper back shave biopsy:  - Malignant melanoma 2 3mm    Mitosis: 6  Ulceration: not identified     2/2/2021 Surgery    Wide excision with sentinel lymph node biopsy         History of Present Illness:   38-year-old man, stage II melanoma, here for wound check  He has seen her medical oncology team and will be starting systemic immunotherapy in the next 1-2 weeks   -Interval History:  He has been doing wound care to the back incision with help of his family  No fevers, chills, or signs of infection  Review of Systems:  Review of Systems   Constitutional: Negative  HENT: Negative  Eyes: Negative  Respiratory: Negative  Cardiovascular: Negative  Gastrointestinal: Negative  Endocrine: Negative  Genitourinary: Negative  Musculoskeletal: Negative  Skin: Positive for wound  Allergic/Immunologic: Negative  Neurological: Negative  Hematological: Negative  Psychiatric/Behavioral: Negative  All other systems reviewed and are negative  Patient Active Problem List   Diagnosis    Malignant melanoma of upper back ([facility] )     No past medical history on file    Past Surgical History:   Procedure Laterality Date  LYMPH NODE BIOPSY N/A 2/2/2021    Procedure: BIOPSY LYMPH NODE LEFT AXILLARY SENTINEL;  Surgeon: Rohith Martinez MD;  [facility]: [facility];  Service: Surgical Oncology    NO PAST SURGERIES      SKIN LESION EXCISION Left 2/2/2021    Procedure: WIDE EXCISION MELANOMA SCAR UPPER BACK, intraoperative lymphatic mapping, sentinel lymph node biopsy; [address];  Surgeon: Rohith Martinez MD;  Location: [facility];  Service: Surgical Oncology     Family History   Problem Relation Age of Onset    Basal cell carcinoma Father     No Known Problems Mother     Breast cancer Paternal Aunt 43    Breast cancer Paternal Grandmother [de-identified]     Social History     Socioeconomic History    Marital status: /Civil Union     Spouse name: Not on file    Number of children: Not on file    Years of education: Not on file    Highest education level: Not on file   Occupational History    Not on file   Social Needs    Financial resource strain: Not on file    Food insecurity     Worry: Not on file     Inability: Not on file   [employer] needs     Medical: Not on file     Non-medical: Not on file   Tobacco Use    Smoking status: Never Smoker    Smokeless tobacco: Never Used   Substance and Sexual Activity    Alcohol use:  Yes    Drug use: Never    Sexual activity: Not on file   Lifestyle    Physical activity     Days per week: Not on file     Minutes per session: Not on file    Stress: Not on file   Relationships    Social connections     Talks on phone: Not on file     Gets together: Not on file     Attends Islam service: Not on file     Active member of club or organization: Not on file     Attends meetings of clubs or organizations: Not on file     Relationship status: Not on file    Intimate partner violence     Fear of current or ex partner: Not on file     Emotionally abused: Not on file     Physically abused: Not on file     Forced sexual activity: Not on file   Other Topics Concern    Not on file Social History Narrative    Not on file     No current outpatient medications on file  No Known Allergies  Vitals:    03/26/21 0840   BP: 130/90   Pulse: 101   Resp: 16   Temp: 98 7 °F (37 1 °C)       Physical Exam  Skin:     Comments: Back excision site looks good  Granulating nicely  No more room to pack as granulation tissue is superficial at this point  Results:  Labs:  CBC, Coags, BMP, Mg, Phos   Liver Function, Amylase, & Lipase     Imaging  Ct Chest Abdomen Pelvis W Contrast    Result Date: 2/26/2021  Narrative: CT CHEST, ABDOMEN AND PELVIS WITH IV CONTRAST INDICATION:   C43 59: Malignant melanoma of other part of trunk  COMPARISON:  None  TECHNIQUE: CT examination of the chest, abdomen and pelvis was performed  In addition to portal venous phase postcontrast scanning through the abdomen and pelvis, delayed phase postcontrast scanning was performed through the upper abdominal viscera  Axial, sagittal, and coronal 2D reformatted images were created from the source data and submitted for interpretation  Radiation dose length product (DLP) for this visit:  1016 9 mGy-cm   This examination, like all CT scans performed in the [facility], was performed utilizing techniques to minimize radiation dose exposure, including the use of iterative  reconstruction and automated exposure control  IV Contrast:  100 mL of iohexol (OMNIPAQUE) Enteric Contrast: Enteric contrast was administered  FINDINGS: CHEST LUNGS:  Lungs are clear  There is no tracheal or endobronchial lesion  PLEURA:  Unremarkable  HEART/GREAT VESSELS:  Unremarkable for patient's age  MEDIASTINUM AND MAGALYS:  Unremarkable  CHEST WALL AND LOWER NECK:  Soft tissue defect containing packing material in the left upper back, consistent with recent melanoma resection  No residual mass or masslike enhancement in the resection bed  Surgical changes in the left axilla   ABDOMEN LIVER/BILIARY TREE:  One or more subcentimeter sharply circumscribed low-density hepatic lesion(s) are noted, too small to accurately characterize, but statistically most likely to represent subcentimeter hepatic cysts  No suspicious solid hepatic lesion is identified  Hepatic contours are normal   No biliary dilatation  GALLBLADDER:  No calcified gallstones  No pericholecystic inflammatory change  SPLEEN:  Unremarkable  PANCREAS:  Unremarkable  ADRENAL GLANDS:  Unremarkable  KIDNEYS/URETERS:  One or more simple renal cyst(s) is noted  Otherwise unremarkable kidneys  No hydronephrosis  STOMACH AND BOWEL:  No bowel obstruction  There is colonic diverticulosis without evidence of acute diverticulitis  APPENDIX:  Normal  ABDOMINOPELVIC CAVITY:  No ascites  No pneumoperitoneum  No lymphadenopathy  VESSELS:  Unremarkable for patient's age  PELVIS REPRODUCTIVE ORGANS:  Unremarkable for patient's age  URINARY BLADDER:  Unremarkable  ABDOMINAL WALL/INGUINAL REGIONS:  Unremarkable  OSSEOUS STRUCTURES:  No acute fracture or destructive osseous lesion  Chronic bilateral L3 spondylolysis without spondylolisthesis  Impression: No evidence of metastatic disease in the chest, abdomen, or pelvis  Surgical changes in the left upper back soft tissues and left axilla  Workstation performed: [id number]     I reviewed the above laboratory and imaging data  Discussion/Summary:  75-year-old man, stage III melanoma, with healing wound  At this point, would change to topical dressings only  Plan of follow-up in 4 months with surveillance imaging at that time    Commence/continue systemic therapy per medical oncology team

## 2021-03-31 ENCOUNTER — TELEPHONE (OUTPATIENT)
Dept: HEMATOLOGY ONCOLOGY | Facility: CLINIC | Age: 46
End: 2021-03-31

## 2021-03-31 DIAGNOSIS — C43.59 MALIGNANT MELANOMA OF UPPER BACK (HCC): Primary | ICD-10-CM

## 2021-03-31 NOTE — TELEPHONE ENCOUNTER
Below email was sent to pharmacy today -    Good morning,  For this patient Primary insurance   Help Desk- [phone]      ID- [id number]  BIN: [id number] / PCN:  Joni Mantilla is approved  Mekinist is approved but is on hold because per [facility] [place] [facility] [employer] exceeds max prior auth needs to be obtained  Per [facility] the pharmacist is the one who has to call and put this auth in place  Can [facility] please reach out to [place] script at [phone] and put this in place? For his [employer] [address] [phone] is reviewing and both medications are still pending  I called [facility] [phone] this morning spoke with pharmacist felecia Villegas and answered additional clinical questions she said the pharmacist would review (hopefully today) and have an answer for me by the end of the day  4/2/21- [facility] stated they could not fill for this pt and his info was sent over to [facility] (late note entry)  I followed up with [facility] on 4/1/21 and they stated they were receiving rejections stating they could not fill either  I called [facility] to review  S/w Jimyeseniane Franca- she states pt's coverage termed on 3/31/21 hence the rejection  Patient now only has [employer]/mer[employer]ain/ [employer]  I sent an email to [facility] to see if they could provide through this insurance only  Will wait for their reply  4/2/21- UPDATE- Per [facility] has to fill  I called [facility] ([phone]) s/w Curly Mishra confirmed they are able to fill both drugs  Provided copay information as well  [address] [place] [address]-   TXO-[id number]  OQN-[phone]    Please make sure whe you escribe that the NPI and Fax match above other wise the RX will not get to them    Clinical team and Finance aware

## 2021-04-01 ENCOUNTER — TELEPHONE (OUTPATIENT)
Dept: HEMATOLOGY ONCOLOGY | Facility: CLINIC | Age: 46
End: 2021-04-01

## 2021-04-02 DIAGNOSIS — C43.59 MALIGNANT MELANOMA OF UPPER BACK (HCC): ICD-10-CM

## 2021-05-03 DIAGNOSIS — C43.59 MALIGNANT MELANOMA OF UPPER BACK (HCC): Primary | ICD-10-CM

## 2021-05-07 ENCOUNTER — TELEPHONE (OUTPATIENT)
Dept: HEMATOLOGY ONCOLOGY | Facility: CLINIC | Age: 46
End: 2021-05-07

## 2021-05-07 NOTE — TELEPHONE ENCOUNTER
Patient called to report he is getting a bill for Tafinlar for $ 2,[phone] and a bill for Mekinist for $ 3,582 89   Please contact patient at [phone]

## 2021-05-11 NOTE — TELEPHONE ENCOUNTER
I called Lynsey Pittman ([phone]) s/w Kimberly she states these balances were before his copay card was applied to the bill  Patient can disregard this letter he has a $0 balance for both medications  Called Severa Muff and advised of above  He voiced understanding and thanked me for my call

## 2021-06-08 ENCOUNTER — TELEPHONE (OUTPATIENT)
Dept: OTHER | Facility: OTHER | Age: 46
End: 2021-06-08

## 2021-06-09 ENCOUNTER — APPOINTMENT (OUTPATIENT)
Dept: LAB | Facility: HOSPITAL | Age: 46
End: 2021-06-09
Attending: INTERNAL MEDICINE
Payer: COMMERCIAL

## 2021-06-09 DIAGNOSIS — C43.59 MALIGNANT MELANOMA OF UPPER BACK (HCC): ICD-10-CM

## 2021-06-09 LAB
ALBUMIN SERPL BCP-MCNC: 4 G/DL (ref 3.5–5.7)
ALP SERPL-CCNC: 67 U/L (ref 40–150)
ALT SERPL W P-5'-P-CCNC: 35 U/L (ref 7–52)
ANION GAP SERPL CALCULATED.3IONS-SCNC: 10 MMOL/L (ref 4–13)
AST SERPL W P-5'-P-CCNC: 27 U/L (ref 13–39)
BASOPHILS # BLD AUTO: 0 THOUSANDS/ΜL (ref 0–0.1)
BASOPHILS NFR BLD AUTO: 1 % (ref 0–2)
BILIRUB SERPL-MCNC: 0.3 MG/DL (ref 0.2–1)
BUN SERPL-MCNC: 21 MG/DL (ref 7–25)
CALCIUM SERPL-MCNC: 9.8 MG/DL (ref 8.6–10.5)
CHLORIDE SERPL-SCNC: 103 MMOL/L (ref 98–107)
CO2 SERPL-SCNC: 25 MMOL/L (ref 21–31)
CREAT SERPL-MCNC: 1.2 MG/DL (ref 0.7–1.3)
EOSINOPHIL # BLD AUTO: 0.1 THOUSAND/ΜL (ref 0–0.61)
EOSINOPHIL NFR BLD AUTO: 1 % (ref 0–5)
ERYTHROCYTE [DISTWIDTH] IN BLOOD BY AUTOMATED COUNT: 14 % (ref 11.5–14.5)
GFR SERPL CREATININE-BSD FRML MDRD: 72 ML/MIN/1.73SQ M
GLUCOSE P FAST SERPL-MCNC: 154 MG/DL (ref 65–99)
HCT VFR BLD AUTO: 43 % (ref 42–47)
HGB BLD-MCNC: 14.5 G/DL (ref 14–18)
LYMPHOCYTES # BLD AUTO: 1.9 THOUSANDS/ΜL (ref 0.6–4.47)
LYMPHOCYTES NFR BLD AUTO: 40 % (ref 21–51)
MCH RBC QN AUTO: 31.1 PG (ref 26–34)
MCHC RBC AUTO-ENTMCNC: 33.7 G/DL (ref 31–37)
MCV RBC AUTO: 92 FL (ref 81–99)
MONOCYTES # BLD AUTO: 0.3 THOUSAND/ΜL (ref 0.17–1.22)
MONOCYTES NFR BLD AUTO: 7 % (ref 2–12)
NEUTROPHILS # BLD AUTO: 2.4 THOUSANDS/ΜL (ref 1.4–6.5)
NEUTS SEG NFR BLD AUTO: 52 % (ref 42–75)
PLATELET # BLD AUTO: 157 THOUSANDS/UL (ref 149–390)
PMV BLD AUTO: 9 FL (ref 8.6–11.7)
POTASSIUM SERPL-SCNC: 3.8 MMOL/L (ref 3.5–5.5)
PROT SERPL-MCNC: 7 G/DL (ref 6.4–8.9)
RBC # BLD AUTO: 4.65 MILLION/UL (ref 4.3–5.9)
SODIUM SERPL-SCNC: 138 MMOL/L (ref 134–143)
WBC # BLD AUTO: 4.7 THOUSAND/UL (ref 4.8–10.8)

## 2021-06-09 PROCEDURE — 80053 COMPREHEN METABOLIC PANEL: CPT

## 2021-06-09 PROCEDURE — 36415 COLL VENOUS BLD VENIPUNCTURE: CPT

## 2021-06-09 PROCEDURE — 85025 COMPLETE CBC W/AUTO DIFF WBC: CPT

## 2021-06-09 NOTE — TELEPHONE ENCOUNTER
Can you please reschedule this patient for [facility] with Dr Keturah Lee (next available)  Patient MUST have labs done at least 3 days prior to the appointment  Thanks

## 2021-06-14 DIAGNOSIS — C43.59 MALIGNANT MELANOMA OF UPPER BACK (HCC): ICD-10-CM

## 2021-06-15 ENCOUNTER — OFFICE VISIT (OUTPATIENT)
Dept: HEMATOLOGY ONCOLOGY | Facility: CLINIC | Age: 46
End: 2021-06-15
Payer: COMMERCIAL

## 2021-06-15 VITALS
SYSTOLIC BLOOD PRESSURE: 140 MMHG | DIASTOLIC BLOOD PRESSURE: 82 MMHG | HEART RATE: 91 BPM | BODY MASS INDEX: 30.92 KG/M2 | OXYGEN SATURATION: 98 % | RESPIRATION RATE: 16 BRPM | WEIGHT: 204 LBS | HEIGHT: 68 IN

## 2021-06-15 DIAGNOSIS — C77.9 MELANOMA METASTATIC TO LYMPH NODE (HCC): ICD-10-CM

## 2021-06-15 DIAGNOSIS — C43.9 MELANOMA METASTATIC TO LYMPH NODE (HCC): ICD-10-CM

## 2021-06-15 DIAGNOSIS — C43.59 MALIGNANT MELANOMA OF UPPER BACK (HCC): Primary | ICD-10-CM

## 2021-06-15 PROCEDURE — 99214 OFFICE O/P EST MOD 30 MIN: CPT | Performed by: INTERNAL MEDICINE

## 2021-06-15 NOTE — PROGRESS NOTES
HEMATOLOGY / ONCOLOGY CLINIC NOTE    Primary Care Provider: No primary care provider on file  Referring Provider:    MRN: [id number]  : 1975    Reason for Encounter:    Chief Complaint   Patient presents with    Follow-up         History of Hematology / Oncology Illness:     Alton Tovar is a 50 y o  male who came in  to establish care with oncology      1, cutaneous melanoma, T3a N2a, stage IIIb, located on patient's upper back, with sentinel lymph node involvement / BRaf [id number] mut     - diagnosed in 2021 status post wide excision with sentinel lymph node biopsy on 2021   - 2021, CT scan chest abdomen pelvic did not show any evidence for metastatic disease   - 2021:  Given mutations positive for 600arg, we decided to proceed with oral TKI with Tafinalar 150 mg b i d  Mekinist 2 mg p o  Daily     - given patient's young, will plan to check labs every 2 months, CT scan every 4 months  Assessment / Plan:       1  Malignant melanoma of upper back [facility])  - patient has been on Tafinalar/Mekinist for 2 months now  Doing well no issues  Continue current treatment  Will plan to repeat a CT scan in 2 months  - given patient's young, will plan to check labs every 2 months, CT scan every 4 months     - CT chest abdomen pelvis w contrast; Future    2  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 ([facility] [address] )    - as above            Made patient aware regarding side effects of chemotherapy, including but not limited to fatigue cytopenia, increased risk for infection, potential kidney, liver injuries neuropathies et al    Made patient aware to call MD or go to ED for any fever,  Chills, bleeding, easy bruise, unhealed wound, chest pain, abdominal pain et al                       minutes were spent face to face with patient with greater than 50% of the time spent in counseling or coordination of care including discussions of treatment instructions    All of the patient's questions were answered to their satisfactory during this discussion  Advised pt to call if there is any further questions  Interval History:     3/9/2021:  77-year-old male generally healthy, on routine dermatologist evaluation noticed a skin lesion status post biopsy showed melanoma, patient was then referred to surgical oncologist status post wide excision and sentinel lymph node biopsy, patient was found having lymph node involvement, referred to Medical Oncology for further evaluation    As for now patient's wound is not completely healed yet, otherwise patient feeling okay, no subjective palpable lymph nodes, no new skin lesion  Body weight stable no other constitutional symptoms  Patient has no other malignancy in the past, no strong family history of malignancy  Father had history of skin basal cell carcinoma  Patient is a nonsmoker do not drink alcohol regular basis, actively  in a [employer]  ECOG 0       3/23/2021 :  Patient reported doing well no new lumps bumps or any skin lesion  Incision on his back is active healing  6/15/2021:  Patient came in for follow-up, subjectively has been doing well no new skin lesions no lumps bumps back lesion is healing very well  Problem list:       Patient Active Problem List   Diagnosis    Malignant melanoma of upper back ([facility] [address] )    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 ([facility] [address] )         PHYSICIAL EXAMINATION:     Vital Signs:   /82 (BP Location: Left arm, Patient Position: Sitting, Cuff Size: Adult)   Pulse 91   Resp 16   Ht 5' 8" (1 727 m)   Wt 92 5 kg (204 lb)   SpO2 98%   BMI 31 02 kg/m²   Body surface area is 2 06 meters squared     Ht Readings from Last 3 Encounters:   06/15/21 5' 8" (1 727 m)   03/26/21 5' 8" (1 727 m)   03/23/21 5' 8" (1 727 m)       Wt Readings from Last 3 Encounters:   06/15/21 92 5 kg (204 lb)   03/26/21 93 9 kg (207 lb)   03/23/21 93 9 kg (207 lb)        Temp Readings from Last 3 Encounters:   03/26/21 98 7 °F (37 1 °C) 03/23/21 97 6 °F (36 4 °C)   03/09/21 97 6 °F (36 4 °C)        BP Readings from Last 3 Encounters:   06/15/21 140/82   03/26/21 130/90   03/23/21 141/89         Pulse Readings from Last 3 Encounters:   06/15/21 91   03/26/21 101   03/23/21 90       Incision marks noticed on his back, probably gauzed  no palpable lymphadenopathy; no new findings compared to previous visit      GEN: Alert, awake oriented x3, in no acute distress  HEENT- No pallor, icterus, cyanosis, no oral mucosal lesions,   LAD - no palpable cervical, clavicle, axillary, inguinal LAD  Heart- normal S1 S2, regular rate and rhythm, No murmur, rubs  Lungs- decreased breathing sound bilateral    Abdomen- soft, Non tender, bowel sounds present  Extremities- No cyanosis, clubbing, edema  Neuro- No focal neurological deficit           PAST MEDICAL HISTORY:   has no past medical history on file  PAST SURGICAL HISTORY:   has a past surgical history that includes No past surgeries; Skin lesion excision (Left, 2/2/2021); and Lymph node biopsy (N/A, 2/2/2021)  CURRENT MEDICATIONS:     Current Outpatient Medications   Medication Sig Dispense Refill    dabrafenib (Tafinlar) 75 MG capsule Take 2 capsules by mouth 2 times a day  Take on empty stomach  120 capsule 2    Trametinib Dimethyl Sulfoxide (Mekinist) 2 MG TABS Take 1 tablet by mouth daily  30 tablet 2     No current facility-administered medications for this visit  [unfilled]    SOCIAL HISTORY:   reports that he has never smoked  He has never used smokeless tobacco  He reports current alcohol use  He reports that he does not use drugs  FAMILY HISTORY:  family history includes Basal cell carcinoma in his father; Breast cancer (age of onset: 43) in his paternal aunt; Breast cancer (age of onset: [de-identified]) in his paternal grandmother; No Known Problems in his mother  ALLERGIES:  has No Known Allergies      REVIEW OF SYSTEMS:  Please note that a 14-point review of systems was performed to include Constitutional, HEENT, Respiratory, CVS, GI, , Musculoskeletal, Integumentary, Neurologic, Rheumatologic, Endocrinologic, Psychiatric, Lymphatic, and Hematologic/Oncologic systems were reviewed and are negative unless otherwise stated in HPI  Positive and negative findings pertinent to this evaluation are incorporated into the history of present illness  Lab Re  Lab Results   Component Value Date    WBC 4 70 (L) 06/09/2021    HGB 14 5 06/09/2021    HCT 43 0 06/09/2021    MCV 92 06/09/2021     06/09/2021   sults   Component Value Date    WBC 5 20 01/20/2021    HGB 15 5 01/20/2021    HCT 46 4 01/20/2021    MCV 94 01/20/2021     01/20/2021      Component Value Date    SODIUM 138 06/09/2021    K 3 8 06/09/2021     06/09/2021    CO2 25 06/09/2021    AGAP 10 06/09/2021    BUN 21 06/09/2021    CREATININE 1 20 06/09/2021    GLUF 154 (H) 06/09/2021    CALCIUM 9 8 06/09/2021    AST 27 06/09/2021    ALT 35 06/09/2021    ALKPHOS 67 06/09/2021    TP 7 0 06/09/2021    TBILI 0 30 06/09/2021    EGFR 72 06/09/2021       CBC with diff:   Results from last 7 days   Lab Units 06/09/21  0842   WBC Thousand/uL 4 70*   HEMOGLOBIN g/dL 14 5   HEMATOCRIT % 43 0   MCV fL 92   PLATELETS Thousands/uL 157   MCH pg 31 1   MCHC g/dL 33 7   RDW % 14 0   MPV fL 9 0       CMP:  Results from last 7 days   Lab Units 06/09/21  0842   POTASSIUM mmol/L 3 8   CHLORIDE mmol/L 103   CO2 mmol/L 25   BUN mg/dL 21   CREATININE mg/dL 1 20   CALCIUM mg/dL 9 8   AST U/L 27   ALT U/L 35   ALK PHOS U/L 67   EGFR ml/min/1 73sq m 72       IMAGING:    CT chest abdomen pelvis w contrast    (Results Pending)     Ct Chest Abdomen Pelvis W Contrast    Result Date: 2/26/2021  Narrative: CT CHEST, ABDOMEN AND PELVIS WITH IV CONTRAST INDICATION:   C43 59: Malignant melanoma of other part of trunk  COMPARISON:  None  TECHNIQUE: CT examination of the chest, abdomen and pelvis was performed    In addition to portal venous phase postcontrast scanning through the abdomen and pelvis, delayed phase postcontrast scanning was performed through the upper abdominal viscera  Axial, sagittal, and coronal 2D reformatted images were created from the source data and submitted for interpretation  Radiation dose length product (DLP) for this visit:  1016 9 mGy-cm   This examination, like all CT scans performed in the [facility], was performed utilizing techniques to minimize radiation dose exposure, including the use of iterative  reconstruction and automated exposure control  IV Contrast:  100 mL of iohexol (OMNIPAQUE) Enteric Contrast: Enteric contrast was administered  FINDINGS: CHEST LUNGS:  Lungs are clear  There is no tracheal or endobronchial lesion  PLEURA:  Unremarkable  HEART/GREAT VESSELS:  Unremarkable for patient's age  MEDIASTINUM AND MAGALYS:  Unremarkable  CHEST WALL AND LOWER NECK:  Soft tissue defect containing packing material in the left upper back, consistent with recent melanoma resection  No residual mass or masslike enhancement in the resection bed  Surgical changes in the left axilla  ABDOMEN LIVER/BILIARY TREE:  One or more subcentimeter sharply circumscribed low-density hepatic lesion(s) are noted, too small to accurately characterize, but statistically most likely to represent subcentimeter hepatic cysts  No suspicious solid hepatic lesion is identified  Hepatic contours are normal   No biliary dilatation  GALLBLADDER:  No calcified gallstones  No pericholecystic inflammatory change  SPLEEN:  Unremarkable  PANCREAS:  Unremarkable  ADRENAL GLANDS:  Unremarkable  KIDNEYS/URETERS:  One or more simple renal cyst(s) is noted  Otherwise unremarkable kidneys  No hydronephrosis  STOMACH AND BOWEL:  No bowel obstruction  There is colonic diverticulosis without evidence of acute diverticulitis  APPENDIX:  Normal  ABDOMINOPELVIC CAVITY:  No ascites  No pneumoperitoneum  No lymphadenopathy   VESSELS:  Unremarkable for patient's age  PELVIS REPRODUCTIVE ORGANS:  Unremarkable for patient's age  URINARY BLADDER:  Unremarkable  ABDOMINAL WALL/INGUINAL REGIONS:  Unremarkable  OSSEOUS STRUCTURES:  No acute fracture or destructive osseous lesion  Chronic bilateral L3 spondylolysis without spondylolisthesis  Impression: No evidence of metastatic disease in the chest, abdomen, or pelvis  Surgical changes in the left upper back soft tissues and left axilla   Workstation performed: [id number]

## 2021-06-23 ENCOUNTER — HOSPITAL ENCOUNTER (OUTPATIENT)
Dept: CT IMAGING | Facility: HOSPITAL | Age: 46
Discharge: HOME | End: 2021-06-23
Attending: INTERNAL MEDICINE
Payer: COMMERCIAL

## 2021-06-23 ENCOUNTER — HOSPITAL ENCOUNTER (OUTPATIENT)
Dept: ULTRASOUND IMAGING | Facility: HOSPITAL | Age: 46
Discharge: HOME | End: 2021-06-23
Payer: COMMERCIAL

## 2021-06-23 DIAGNOSIS — C43.59 MALIGNANT MELANOMA OF UPPER BACK (HCC): ICD-10-CM

## 2021-06-23 PROCEDURE — G1004 CDSM NDSC: HCPCS

## 2021-06-23 PROCEDURE — 74177 CT ABD & PELVIS W/CONTRAST: CPT

## 2021-06-23 PROCEDURE — 76882 US LMTD JT/FCL EVL NVASC XTR: CPT

## 2021-06-23 PROCEDURE — 71260 CT THORAX DX C+: CPT

## 2021-06-23 RX ADMIN — IOHEXOL 100 ML: 350 INJECTION, SOLUTION INTRAVENOUS at 07:27

## 2021-07-07 ENCOUNTER — OFFICE VISIT (OUTPATIENT)
Dept: SURGICAL ONCOLOGY | Facility: CLINIC | Age: 46
End: 2021-07-07
Payer: COMMERCIAL

## 2021-07-07 VITALS
WEIGHT: 213.8 LBS | BODY MASS INDEX: 32.4 KG/M2 | DIASTOLIC BLOOD PRESSURE: 98 MMHG | RESPIRATION RATE: 17 BRPM | HEART RATE: 97 BPM | TEMPERATURE: 98.2 F | SYSTOLIC BLOOD PRESSURE: 155 MMHG | OXYGEN SATURATION: 98 % | HEIGHT: 68 IN

## 2021-07-07 DIAGNOSIS — C43.9 MELANOMA METASTATIC TO LYMPH NODE (HCC): Primary | ICD-10-CM

## 2021-07-07 DIAGNOSIS — C77.9 MELANOMA METASTATIC TO LYMPH NODE (HCC): Primary | ICD-10-CM

## 2021-07-07 PROCEDURE — 99214 OFFICE O/P EST MOD 30 MIN: CPT | Performed by: SURGERY

## 2021-07-07 NOTE — PROGRESS NOTES
Surgical Oncology Follow Up       [facility] [place]  [facility] [address]    Vel Gillis  1975  [id number]  [facility] [address]    Chief Complaint   Patient presents with    Follow-up       Assessment/Plan:    No problem-specific Assessment & Plan notes found for this encounter  There are no diagnoses linked to this encounter  Advance Care Planning/Advance Directives:  Discussed disease status, cancer treatment plans and/or cancer treatment goals with the patient  Oncology History   Malignant melanoma of upper back ([facility] [address] )   1/4/2020 Biopsy    Left upper back shave biopsy:  - Malignant melanoma 2 3mm    Mitosis: 6  Ulceration: not identified     2/2/2021 Surgery    Wide excision with sentinel lymph node biopsy  Cutaneous melanoma, T3a N2a, stage IIIb, located on patient's upper back, with sentinel lymph node involvement / BRaf [id number] mut    4/8/2021 Genomic Testing    Decision DX: Class 1B         History of Present Illness:   58-year-old man, history of stage III melanoma, here for surveillance  He has started TKI therapy per Dr Mynor Taylor and is tolerating this fairly well  -Interval History:  He had CT scan and ultrasound done recently  He has no complaints to report  Review of Systems:  Review of Systems   Constitutional: Negative  HENT: Negative  Eyes: Negative  Respiratory: Negative  Cardiovascular: Negative  Gastrointestinal: Negative  Endocrine: Negative  Genitourinary: Negative  Musculoskeletal: Negative  Skin: Negative  Allergic/Immunologic: Negative  Neurological: Negative  Hematological: Negative  Psychiatric/Behavioral: Negative  All other systems reviewed and are negative        Patient Active Problem List   Diagnosis    Malignant melanoma of upper back (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 ([facility] [address] )     No past medical history on file  Past Surgical History:   Procedure Laterality Date    LYMPH NODE BIOPSY N/A 2/2/2021    Procedure: BIOPSY LYMPH NODE LEFT AXILLARY SENTINEL;  Surgeon: Stefanie Bird MD;  [facility]: [facility];  Service: Surgical Oncology    NO PAST SURGERIES      SKIN LESION EXCISION Left 2/2/2021    Procedure: WIDE EXCISION MELANOMA SCAR UPPER BACK, intraoperative lymphatic mapping, sentinel lymph node biopsy; [address];  Surgeon: Stefanie Bird MD;  Location: [facility];  Service: Surgical Oncology     Family History   Problem Relation Age of Onset    Basal cell carcinoma Father     No Known Problems Mother     Breast cancer Paternal Aunt 43    Breast cancer Paternal Grandmother [de-identified]     Social History     Socioeconomic History    Marital status: /Civil Union     Spouse name: Not on file    Number of children: Not on file    Years of education: Not on file    Highest education level: Not on file   Occupational History    Not on file   Tobacco Use    Smoking status: Never Smoker    Smokeless tobacco: Never Used   Vaping Use    Vaping Use: Never used   Substance and Sexual Activity    Alcohol use: Yes    Drug use: Never    Sexual activity: Not on file   Other Topics Concern    Not on file   Social History Narrative    Not on file     Social Determinants of Health     Financial Resource Strain:     Difficulty of Paying Living Expenses:    Food Insecurity:     Worried About Running Out of Food in the Last Year:     [address] in the Last Year:    Transportation Needs:     Lack of Transportation (Medical):      Lack of Transportation (Non-Medical):    Physical Activity:     Days of Exercise per Week:     Minutes of Exercise per Session:    Stress:     Feeling of Stress :    Social Connections:     Frequency of Communication with Friends and Family:     Frequency of Social Gatherings with Friends and Family:     Attends Jewish Services:     Active Member of Clubs or Organizations:     Attends Club or Organization Meetings:     Marital Status:    Intimate Partner Violence:     Fear of Current or Ex-Partner:     Emotionally Abused:     Physically Abused:     Sexually Abused:        Current Outpatient Medications:     dabrafenib (Tafinlar) 75 MG capsule, Take 2 capsules by mouth 2 times a day  Take on empty stomach , Disp: 120 capsule, Rfl: 2    Trametinib Dimethyl Sulfoxide (Mekinist) 2 MG TABS, Take 1 tablet by mouth daily  , Disp: 30 tablet, Rfl: 2  No Known Allergies  Vitals:    07/07/21 0903   BP: 155/98   Pulse: 97   Resp: 17   Temp: 98 2 °F (36 8 °C)   SpO2: 98%       Physical Exam  Constitutional:       Appearance: Normal appearance  HENT:      Head: Normocephalic and atraumatic  Right Ear: External ear normal       Left Ear: External ear normal    Eyes:      Extraocular Movements: Extraocular movements intact  Pupils: Pupils are equal, round, and reactive to light  Cardiovascular:      Rate and Rhythm: Normal rate and regular rhythm  Pulses: Normal pulses  Heart sounds: Normal heart sounds  Pulmonary:      Breath sounds: Normal breath sounds  Abdominal:      General: Abdomen is flat  Palpations: Abdomen is soft  Musculoskeletal:         General: Normal range of motion  Cervical back: Normal range of motion and neck supple  Skin:     General: Skin is warm and dry  Comments: Well-healed back excision site  No evidence of recurrence visible or palpable at site  Left axillary incision looks good  No palpable adenopathy  No cervical adenopathy  Neurological:      General: No focal deficit present  Mental Status: He is alert and oriented to person, place, and time     Psychiatric:         Mood and Affect: Mood normal          Behavior: Behavior normal            Results:  Labs:  Lab Results   Component Value Date    SODIUM 138 06/09/2021    K 3 8 06/09/2021     06/09/2021    CO2 25 06/09/2021    AGAP 10 06/09/2021    BUN 21 06/09/2021    CREATININE 1 20 06/09/2021    GLUF 154 (H) 06/09/2021    CALCIUM 9 8 06/09/2021    AST 27 06/09/2021    ALT 35 06/09/2021    ALKPHOS 67 06/09/2021    TP 7 0 06/09/2021    TBILI 0 30 06/09/2021    EGFR 72 06/09/2021         Imaging  CT chest abdomen pelvis w contrast    Result Date: 6/30/2021  Narrative: CT CHEST, ABDOMEN AND PELVIS WITH IV CONTRAST INDICATION:   C43 59: Malignant melanoma of other part of trunk  f/u to melanoma back, diagnosed Dec 2020, surg Feb 2021, taking oral med for preventative measures, COMPARISON:  2/26/2021 TECHNIQUE: CT examination of the chest, abdomen and pelvis was performed  Axial, sagittal, and coronal 2D reformatted images were created from the source data and submitted for interpretation  Radiation dose length product (DLP) for this visit:  861 5 mGy-cm   This examination, like all CT scans performed in the [facility], was performed utilizing techniques to minimize radiation dose exposure, including the use of iterative reconstruction and automated exposure control  IV Contrast:  100 mL of iohexol (OMNIPAQUE) Enteric Contrast: Enteric contrast was not administered  FINDINGS: CHEST LUNGS:  Lungs are clear  There is no tracheal or endobronchial lesion  PLEURA:  Unremarkable  HEART/GREAT VESSELS:  Minimal atherosclerotic vascular calcifications of the infrarenal abdominal aorta  No focal aneurysm  MEDIASTINUM AND MAGALYS:  Unremarkable  CHEST WALL AND LOWER NECK:   Left axillary scarring and a few clips redemonstrated  Additionally there is interval development of soft tissue in the left upper back image 7, series 2 in the region of prior surgical defect  No pathologic enhancement in this region   ABDOMEN LIVER/BILIARY TREE:  One or more subcentimeter sharply circumscribed low-density hepatic lesion(s) are noted, too small to accurately characterize, but statistically most likely to represent subcentimeter hepatic cysts  No suspicious solid hepatic lesion is identified  Hepatic contours are normal   No biliary dilatation  0 8 cm hypodensity in the left lobe of liver on image 51, series 2 is stable from the previous study  No new hepatic lesions  GALLBLADDER:  No calcified gallstones  No pericholecystic inflammatory change  SPLEEN:  Unremarkable  PANCREAS:  Unremarkable  ADRENAL GLANDS:  Unremarkable  KIDNEYS/URETERS:  Unremarkable  No hydronephrosis  STOMACH AND BOWEL:  There is colonic diverticulosis without evidence of acute diverticulitis  APPENDIX:  No findings to suggest appendicitis  ABDOMINOPELVIC CAVITY:  No ascites  No pneumoperitoneum  No lymphadenopathy  VESSELS:  Minimal atherosclerotic vascular calcifications infrarenal abdominal aorta  No focal aneurysm  PELVIS REPRODUCTIVE ORGANS:  A few punctate calcifications in the normal sized prostate redemonstrated likely related to prior inflammation, stable  URINARY BLADDER:  Unremarkable  ABDOMINAL WALL/INGUINAL REGIONS:  Unremarkable  OSSEOUS STRUCTURES:  No acute fracture or destructive osseous lesion  Impression: 1  No evidence of metastatic disease in the chest, abdomen or pelvis  Continued clinical and imaging surveillance advised  2   Evolving soft tissue opacification, most likely scarring/postsurgical changes in the left upper back in the region of prior excision  Continued clinical and imaging surveillance of this region advised  Workstation performed: [id number]     US extremity soft tissue    Result Date: 6/30/2021  Narrative: ABDOMINAL WALL ULTRASOUND INDICATION:   C43 59: Malignant melanoma of other part of trunk  COMPARISON:  Concurrent CT scan  TECHNIQUE:   Real-time ultrasound of the left axilla was performed with a linear transducer with both volumetric sweeps and still imaging techniques  FINDINGS:  Ultrasound of the left axilla was performed    Inferior and medial to the scar, there is a reniform shaped nodule measuring 1 5 x 0 5 x 0 8 cm  This has the appearance of a lymph node but is indeterminate as the expected fatty hilum is not well appreciated  Impression: Inferior and medial to the scar in the left axilla, there is an abnormal-appearing lymph node  Consider tissue sampling  The study was marked in EPIC for significant notification  Workstation performed: [id number]     I reviewed the above laboratory and imaging data  Discussion/Summary:  Stage III melanoma, now with abnormal node, left axilla  Will set him up for IR evaluation for biopsy  Follow-up here once results available for review  At [facility] and firm, then would need axillary dissection    In the meantime, he will continue systemic therapy per medical oncology team

## 2021-07-08 ENCOUNTER — PREP FOR PROCEDURE (OUTPATIENT)
Dept: INTERVENTIONAL RADIOLOGY/VASCULAR | Facility: CLINIC | Age: 46
End: 2021-07-08

## 2021-07-08 DIAGNOSIS — C43.9 MELANOMA METASTATIC TO LYMPH NODE (HCC): Primary | ICD-10-CM

## 2021-07-08 DIAGNOSIS — C77.9 MELANOMA METASTATIC TO LYMPH NODE (HCC): Primary | ICD-10-CM

## 2021-07-08 RX ORDER — SODIUM CHLORIDE 9 MG/ML
75 INJECTION, SOLUTION INTRAVENOUS CONTINUOUS
OUTPATIENT
Start: 2021-07-08

## 2021-07-12 ENCOUNTER — TELEPHONE (OUTPATIENT)
Dept: INTERVENTIONAL RADIOLOGY/VASCULAR | Facility: HOSPITAL | Age: 46
End: 2021-07-12

## 2021-08-02 ENCOUNTER — PREP FOR PROCEDURE (OUTPATIENT)
Dept: INTERVENTIONAL RADIOLOGY/VASCULAR | Facility: HOSPITAL | Age: 46
End: 2021-08-02

## 2021-08-04 ENCOUNTER — OFFICE VISIT (OUTPATIENT)
Dept: SURGICAL ONCOLOGY | Facility: CLINIC | Age: 46
End: 2021-08-04
Payer: COMMERCIAL

## 2021-08-04 VITALS
OXYGEN SATURATION: 97 % | BODY MASS INDEX: 32.95 KG/M2 | RESPIRATION RATE: 18 BRPM | SYSTOLIC BLOOD PRESSURE: 148 MMHG | HEART RATE: 97 BPM | TEMPERATURE: 98.7 F | WEIGHT: 217.4 LBS | HEIGHT: 68 IN | DIASTOLIC BLOOD PRESSURE: 90 MMHG

## 2021-08-04 DIAGNOSIS — C43.59 MALIGNANT MELANOMA OF UPPER BACK (HCC): Primary | ICD-10-CM

## 2021-08-04 PROCEDURE — 99212 OFFICE O/P EST SF 10 MIN: CPT | Performed by: SURGERY

## 2021-08-04 NOTE — PROGRESS NOTES
Surgical Oncology Follow Up       [facility]Y [facility] [facility] [address]    Sandi Nieto  1975  [id number]  [facility] SURGICAL ONCOLOGY [place] [facility] [address]    Chief Complaint   Patient presents with    Follow-up       Assessment/Plan:    No problem-specific Assessment & Plan notes found for this encounter  Diagnoses and all orders for this visit:    Malignant melanoma of upper back ([address] )  -     US extremity soft tissue; Future        Advance Care Planning/Advance Directives:  Discussed disease status, cancer treatment plans and/or cancer treatment goals with the patient  Oncology History   Malignant melanoma of upper back ([address] )   1/4/2020 Biopsy    Left upper back shave biopsy:  - Malignant melanoma 2 3mm    Mitosis: 6  Ulceration: not identified     2/2/2021 Surgery    Wide excision with sentinel lymph node biopsy     4/8/2021 Genomic Testing    Decision DX: Class 1B         History of Present Illness:   78-year-old man, history of stage III melanoma, here to discuss workup of findings on left axillary ultrasound done June 2021   -Interval History: he is asymptomatic  The ultrasound revealed what appeared to be a lymph node in left axilla  We set him up for needle localization biopsy, though this was very small and superficial   Therefore he was referred back to discuss potential excision or sampling given that it was so superficial   He has not noticed to be finding himself on palpation  He is presently on systemic therapy per Dr Shasta Hurley     Review of Systems:  Review of Systems   Constitutional: Negative  HENT: Negative  Eyes: Negative  Respiratory: Negative  Cardiovascular: Negative  Gastrointestinal: Negative  Endocrine: Negative  Genitourinary: Negative  Musculoskeletal: Negative  Skin: Negative  Allergic/Immunologic: Negative  Neurological: Negative  Hematological: Negative  Psychiatric/Behavioral: Negative  Patient Active Problem List   Diagnosis    Malignant melanoma of upper back [address] )    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 ([facility] )     No past medical history on file  Past Surgical History:   Procedure Laterality Date    LYMPH NODE BIOPSY N/A 2/2/2021    Procedure: BIOPSY LYMPH NODE LEFT AXILLARY SENTINEL;  Surgeon: Stefanie Bird MD;  [facility];  Service: Surgical Oncology    NO PAST SURGERIES      SKIN LESION EXCISION Left 2/2/2021    Procedure: WIDE EXCISION MELANOMA SCAR UPPER BACK, intraoperative lymphatic mapping, sentinel lymph node biopsy; [address];  Surgeon: Stefanie Bird MD;  [facility];  Service: Surgical Oncology     Family History   Problem Relation Age of Onset    Basal cell carcinoma Father     No Known Problems Mother     Breast cancer Paternal Aunt 43    Breast cancer Paternal Grandmother [de-identified]     Social History     Socioeconomic History    Marital status: /Civil Union     Spouse name: Not on file    Number of children: Not on file    Years of education: Not on file    Highest education level: Not on file   Occupational History    Not on file   Tobacco Use    Smoking status: Never Smoker    Smokeless tobacco: Never Used   Vaping Use    Vaping Use: Never used   Substance and Sexual Activity    Alcohol use: Yes    Drug use: Never    Sexual activity: Not on file   Other Topics Concern    Not on file   Social History Narrative    Not on file     Social Determinants of Health     Financial Resource Strain:     Difficulty of Paying Living Expenses:    Food Insecurity:     Worried About Running Out of Food in the Last Year:     [address] in the Last Year:    Transportation Needs:     Lack of Transportation (Medical):      Lack of Transportation (Non-Medical):    Physical Activity:     Days of Exercise per Week:     Minutes of Exercise per Session:    Stress:     Feeling of Stress :    Social Connections:     Frequency of Communication with Friends and Family:     Frequency of Social Gatherings with Friends and Family:     Attends Faith Services:     Active Member of Clubs or Organizations:     Attends Club or Organization Meetings:     Marital Status:    Intimate Partner Violence:     Fear of Current or Ex-Partner:     Emotionally Abused:     Physically Abused:     Sexually Abused:        Current Outpatient Medications:     Trametinib Dimethyl Sulfoxide (Mekinist) 2 MG TABS, Take 1 tablet by mouth daily  , Disp: 30 tablet, Rfl: 2    dabrafenib (Tafinlar) 75 MG capsule, Take 2 capsules by mouth 2 times a day  Take on empty stomach  (Patient not taking: Reported on 8/4/2021), Disp: 120 capsule, Rfl: 2  No Known Allergies  Vitals:    08/04/21 0946   BP: 148/90   Pulse: 97   Resp: 18   Temp: 98 7 °F (37 1 °C)   SpO2: 97%       Physical Exam  Skin:     Comments: Back and left axillary excision sites look good  No palpable abnormalities noted  No skin changes  No obvious adenopathy palpable  Results:  Labs:  None    Imaging  ABDOMINAL WALL ULTRASOUND     INDICATION:   C43 59: Malignant melanoma of other part of trunk      COMPARISON:  Concurrent CT scan      TECHNIQUE:   Real-time ultrasound of the left axilla was performed with a linear transducer with both volumetric sweeps and still imaging techniques      FINDINGS:  Ultrasound of the left axilla was performed  Inferior and medial to the scar, there is a reniform shaped nodule measuring 1 5 x 0 5 x 0 8 cm  This has the appearance of a lymph node but is indeterminate as the expected fatty hilum is not   well appreciated  IMPRESSION:     Inferior and medial to the scar in the left axilla, there is an abnormal-appearing lymph node  Consider tissue sampling      The study was marked in EPIC for significant notification    I reviewed the above laboratory and imaging data     Discussion/Summary:  64-year-old man, history of stage III melanoma, with finding on ultrasound  Originally, biopsy was set up to this was deemed to be fairly small and superficial, with thought that excision would be more effective  However, on close exam, mass is not palpable  We will therefore obtain follow-up ultrasound given that his been 6 weeks since the last ultrasound to see if finding is still present or has resolved

## 2021-08-04 NOTE — LETTER
August 4, 2021     Ankit Godfrey, [address] [address] [facility]    Patient: Roberto Flor   YOB: 1975   Date of Visit: 8/4/2021       Dear Dr Lyle Light:    Thank you for referring Monica Darling to me for evaluation  Below are my notes for this consultation  If you have questions, please do not hesitate to call me  I look forward to following your patient along with you  Sincerely,        Claudia Murguia MD        CC: Alayna Arias MD PhD  Claudia Murguia MD  8/4/2021 10:09 AM  Sign when Signing Visit     Surgical Oncology Follow Up       [facility] [address] [place] [address]    Roberto Flor  1975  [id number]  [facility]  CANCER [facility] SURGICAL ONCOLOGY [address] [address]    Chief Complaint   Patient presents with    Follow-up       Assessment/Plan:    No problem-specific Assessment & Plan notes found for this encounter  Diagnoses and all orders for this visit:    Malignant melanoma of upper back ([address] )  -     US extremity soft tissue; Future        Advance Care Planning/Advance Directives:  Discussed disease status, cancer treatment plans and/or cancer treatment goals with the patient  Oncology History   Malignant melanoma of upper back ([address] )   1/4/2020 Biopsy    Left upper back shave biopsy:  - Malignant melanoma 2 3mm    Mitosis: 6  Ulceration: not identified     2/2/2021 Surgery    Wide excision with sentinel lymph node biopsy     4/8/2021 Genomic Testing    Decision DX: Class 1B         History of Present Illness:   20-year-old man, history of stage III melanoma, here to discuss workup of findings on left axillary ultrasound done June 2021   -Interval History: he is asymptomatic  The ultrasound revealed what appeared to be a lymph node in left axilla    We set him up for needle localization biopsy, though this was very small and superficial  Therefore he was referred back to discuss potential excision or sampling given that it was so superficial   He has not noticed to be finding himself on palpation  He is presently on systemic therapy per Dr Lizett Sandoval     Review of Systems:  Review of Systems   Constitutional: Negative  HENT: Negative  Eyes: Negative  Respiratory: Negative  Cardiovascular: Negative  Gastrointestinal: Negative  Endocrine: Negative  Genitourinary: Negative  Musculoskeletal: Negative  Skin: Negative  Allergic/Immunologic: Negative  Neurological: Negative  Hematological: Negative  Psychiatric/Behavioral: Negative  Patient Active Problem List   Diagnosis    Malignant melanoma of [facility] back [facility] )    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 ([facility] )     No past medical history on file  Past Surgical History:   Procedure Laterality Date    LYMPH NODE BIOPSY N/A 2/2/2021    Procedure: BIOPSY LYMPH NODE LEFT AXILLARY SENTINEL;  Surgeon: Maeve Mcgee MD;  [facility];  Service: Surgical Oncology    NO PAST SURGERIES      SKIN LESION EXCISION Left 2/2/2021    Procedure: WIDE EXCISION MELANOMA SCAR UPPER BACK, intraoperative lymphatic mapping, sentinel lymph node biopsy; [address];  Surgeon: Maeve Mcgee MD;  [facility];  Service: Surgical Oncology     Family History   Problem Relation Age of Onset    Basal cell carcinoma Father     No Known Problems Mother     Breast cancer Paternal Aunt 43    Breast cancer Paternal Grandmother [de-identified]     Social History     Socioeconomic History    Marital status: /Civil Union     Spouse name: Not on file    Number of children: Not on file    Years of education: Not on file    Highest education level: Not on file   Occupational History    Not on file   Tobacco Use    Smoking status: Never Smoker    Smokeless tobacco: Never Used   Vaping Use    Vaping Use: Never used   Substance and Sexual Activity    Alcohol use:  Yes    Drug use: Never    Sexual activity: Not on file   Other Topics Concern    Not on file   Social History Narrative    Not on file     Social Determinants of Health     Financial Resource Strain:     Difficulty of Paying Living Expenses:    Food Insecurity:     Worried About Running Out of Food in the Last Year:     [address] in the Last Year:    Transportation Needs:     Lack of Transportation (Medical):  Lack of Transportation (Non-Medical):    Physical Activity:     Days of Exercise per Week:     Minutes of Exercise per Session:    Stress:     Feeling of Stress :    Social Connections:     Frequency of Communication with Friends and Family:     Frequency of Social Gatherings with Friends and Family:     Attends Mu-ism Services:     Active Member of Clubs or Organizations:     Attends Club or Organization Meetings:     Marital Status:    Intimate Partner Violence:     Fear of Current or Ex-Partner:     Emotionally Abused:     Physically Abused:     Sexually Abused:        Current Outpatient Medications:     Trametinib Dimethyl Sulfoxide (Mekinist) 2 MG TABS, Take 1 tablet by mouth daily  , Disp: 30 tablet, Rfl: 2    dabrafenib (Tafinlar) 75 MG capsule, Take 2 capsules by mouth 2 times a day  Take on empty stomach  (Patient not taking: Reported on 8/4/2021), Disp: 120 capsule, Rfl: 2  No Known Allergies  Vitals:    08/04/21 0946   BP: 148/90   Pulse: 97   Resp: 18   Temp: 98 7 °F (37 1 °C)   SpO2: 97%       Physical Exam  Skin:     Comments: Back and left axillary excision sites look good  No palpable abnormalities noted  No skin changes  No obvious adenopathy palpable  Results:  Labs:  None    Imaging  ABDOMINAL WALL ULTRASOUND     INDICATION:   C43 59:  Malignant melanoma of other part of trunk      COMPARISON:  Concurrent CT scan      TECHNIQUE:   Real-time ultrasound of the left axilla was performed with a linear transducer with both volumetric sweeps and still imaging techniques      FINDINGS:  Ultrasound of the left axilla was performed  Inferior and medial to the scar, there is a reniform shaped nodule measuring 1 5 x 0 5 x 0 8 cm  This has the appearance of a lymph node but is indeterminate as the expected fatty hilum is not   well appreciated  IMPRESSION:     Inferior and medial to the scar in the left axilla, there is an abnormal-appearing lymph node  Consider tissue sampling      The study was marked in EPIC for significant notification  I reviewed the above laboratory and imaging data  Discussion/Summary:  78-year-old man, history of stage III melanoma, with finding on ultrasound  Originally, biopsy was set up to this was deemed to be fairly small and superficial, with thought that excision would be more effective  However, on close exam, mass is not palpable  We will therefore obtain follow-up ultrasound given that his been 6 weeks since the last ultrasound to see if finding is still present or has resolved

## 2021-08-10 ENCOUNTER — OFFICE VISIT (OUTPATIENT)
Dept: HEMATOLOGY ONCOLOGY | Facility: CLINIC | Age: 46
End: 2021-08-10
Payer: COMMERCIAL

## 2021-08-10 VITALS
HEART RATE: 90 BPM | HEIGHT: 68 IN | OXYGEN SATURATION: 98 % | DIASTOLIC BLOOD PRESSURE: 88 MMHG | SYSTOLIC BLOOD PRESSURE: 144 MMHG | WEIGHT: 217.5 LBS | BODY MASS INDEX: 32.96 KG/M2

## 2021-08-10 DIAGNOSIS — C43.59 MALIGNANT MELANOMA OF UPPER BACK (HCC): Primary | ICD-10-CM

## 2021-08-10 DIAGNOSIS — C43.9 MELANOMA METASTATIC TO LYMPH NODE (HCC): ICD-10-CM

## 2021-08-10 DIAGNOSIS — C77.9 MELANOMA METASTATIC TO LYMPH NODE (HCC): ICD-10-CM

## 2021-08-10 PROCEDURE — 99214 OFFICE O/P EST MOD 30 MIN: CPT | Performed by: INTERNAL MEDICINE

## 2021-08-10 NOTE — PROGRESS NOTES
HEMATOLOGY / ONCOLOGY CLINIC NOTE    Primary Care Provider: No primary care provider on file  Referring Provider:    MRN: [id number]  : 1975    Reason for Encounter:    Chief Complaint   Patient presents with    Follow-up     MALIGNANT MELANOMA OF UPPER BACK         History of Hematology / Oncology Illness:     Harriette Severs is a 48 y o  male who came in  to establish care with oncology      1, cutaneous melanoma, T3a N2a, stage IIIb, located on patient's upper back, with sentinel lymph node involvement / BRaf [id number] mut     - diagnosed in 2021 status post wide excision with sentinel lymph node biopsy on 2021   - 2021, CT scan chest abdomen pelvic did not show any evidence for metastatic disease   - 2021:  Given mutations positive for 600arg, we decided to proceed with oral TKI with Tafinalar 150 mg b i d  Mekinist 2 mg p o  Daily     - given patient's young, will plan to check labs every 2 months, CT scan every 4 months  Assessment / Plan:       1  Malign[facility] )    - patient or tolerating oral TKI very well, no evidence of cancer recurrence  Continue surveillance  Will check labs every 2 months repeat CT scan in 4 months will follow patient in October      - CT chest abdomen pelvis w contrast; Future  - CBC and differential; Standing  - Comprehensive metabolic panel; Standing  - LD,Blood; Standing  - CBC and differential  - Comprehensive metabolic panel  - LD,Blood    2   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 ([facility] )      - CT chest abdomen pelvis w contrast; Future  - CBC and differential; Standing  - Comprehensive metabolic panel; Standing  - LD,Blood; Standing  - CBC and differential  - Comprehensive metabolic panel  - LD,Blood        Made patient aware regarding side effects of chemotherapy, including but not limited to fatigue cytopenia, increased risk for infection, potential kidney, liver injuries neuropathies et al    Made patient aware to call MD or go to ED for any fever,  Chills, bleeding, easy bruise, unhealed wound, chest pain, abdominal pain et al                       minutes were spent face to face with patient with greater than 50% of the time spent in counseling or coordination of care including discussions of treatment instructions  All of the patient's questions were answered to their satisfactory during this discussion  Advised pt to call if there is any further questions  Interval History:     3/9/2021:  33-year-old male generally healthy, on routine dermatologist evaluation noticed a skin lesion status post biopsy showed melanoma, patient was then referred to surgical oncologist status post wide excision and sentinel lymph node biopsy, patient was found having lymph node involvement, referred to Medical Oncology for further evaluation    As for now patient's wound is not completely healed yet, otherwise patient feeling okay, no subjective palpable lymph nodes, no new skin lesion  Body weight stable no other constitutional symptoms  Patient has no other malignancy in the past, no strong family history of malignancy  Father had history of skin basal cell carcinoma  Patient is a nonsmoker do not drink alcohol regular basis, actively  in a [place]  ECOG 0       3/23/2021 :  Patient reported doing well no new lumps bumps or any skin lesion  Incision on his back is active healing  6/15/2021:  Patient came in for follow-up, subjectively has been doing well no new skin lesions no lumps bumps back lesion is healing very well  8/10/2021 :  Patient came for follow-up doing okay recently has been followed closely by surgery Onc, patient has another ultrasound coming soon  Subjective patient did not feel any lumps bumps no skin lesion body weight stable no constitutional symptoms    He is tolerating oral medicine very well no issues      Problem list:       Patient Active Problem List   Diagnosis    Malignant melanoma of upper back ([address] )    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 ([facility])         PHYSICIAL EXAMINATION:     Vital Signs:   /88 (BP Location: Left arm)   Pulse 90   Ht 5' 8" (1 727 m)   Wt 98 7 kg (217 lb 8 oz)   SpO2 98%   BMI 33 07 kg/m²   Body surface area is 2 12 meters squared  Ht Readings from Last 3 Encounters:   08/10/21 5' 8" (1 727 m)   08/04/21 5' 8" (1 727 m)   07/07/21 5' 8" (1 727 m)       Wt Readings from Last 3 Encounters:   08/10/21 98 7 kg (217 lb 8 oz)   08/04/21 98 6 kg (217 lb 6 4 oz)   07/07/21 97 kg (213 lb 12 8 oz)        Temp Readings from Last 3 Encounters:   08/04/21 98 7 °F (37 1 °C) (Temporal)   07/07/21 98 2 °F (36 8 °C)   03/26/21 98 7 °F (37 1 °C)        BP Readings from Last 3 Encounters:   08/10/21 144/88   08/04/21 148/90   07/07/21 155/98         Pulse Readings from Last 3 Encounters:   08/10/21 90   08/04/21 97   07/07/21 97       Incision marked notice on right back, no lymphadenopathy no other issues     GEN: Alert, awake oriented x3, in no acute distress  HEENT- No pallor, icterus, cyanosis, no oral mucosal lesions,   LAD - no palpable cervical, clavicle, axillary, inguinal LAD  Heart- normal S1 S2, regular rate and rhythm, No murmur, rubs  Lungs- decreased breathing sound bilateral    Abdomen- soft, Non tender, bowel sounds present  Extremities- No cyanosis, clubbing, edema  Neuro- No focal neurological deficit           PAST MEDICAL HISTORY:   has no past medical history on file  PAST SURGICAL HISTORY:   has a past surgical history that includes No past surgeries; Skin lesion excision (Left, 2/2/2021); and Lymph node biopsy (N/A, 2/2/2021)  CURRENT MEDICATIONS:     Current Outpatient Medications   Medication Sig Dispense Refill    Trametinib Dimethyl Sulfoxide (Mekinist) 2 MG TABS Take 1 tablet by mouth daily  30 tablet 2    dabrafenib (Tafinlar) 75 MG capsule Take 2 capsules by mouth 2 times a day  Take on empty stomach   (Patient not taking: Reported on 8/4/2021) 120 capsule 2     No current facility-administered medications for this visit  [unfilled]    SOCIAL HISTORY:   reports that he has never smoked  He has never used smokeless tobacco  He reports current alcohol use  He reports that he does not use drugs  FAMILY HISTORY:  family history includes Basal cell carcinoma in his father; Breast cancer (age of onset: 43) in his paternal aunt; Breast cancer (age of onset: [de-identified]) in his paternal grandmother; No Known Problems in his mother  ALLERGIES:  has No Known Allergies  REVIEW OF SYSTEMS:  Please note that a 14-point review of systems was performed to include Constitutional, HEENT, Respiratory, CVS, GI, , Musculoskeletal, Integumentary, Neurologic, Rheumatologic, Endocrinologic, Psychiatric, Lymphatic, and Hematologic/Oncologic systems were reviewed and are negative unless otherwise stated in HPI  Positive and negative findings pertinent to this evaluation are incorporated into the history of present illness  Lab Results   Component Value Date    SODIUM 138 06/09/2021    K 3 8 06/09/2021     06/09/2021    CO2 25 06/09/2021    AGAP 10 06/09/2021    BUN 21 06/09/2021    CREATININE 1 20 06/09/2021    GLUF 154 (H) 06/09/2021    CALCIUM 9 8 06/09/2021    AST 27 06/09/2021    ALT 35 06/09/2021    ALKPHOS 67 06/09/2021    TP 7 0 06/09/2021    TBILI 0 30 06/09/2021    EGFR 72 06/09/2021       CBC with diff:         Invalid input(s):  RBC, TOTALCELLSCOUNTED, SEGS%, GRANS%, LYMPHS%, EOS%, BASO%, ABNEUT, ABGRANS, ABLYMPHS, ABMOMOS, ABEOS, ABBASO    CMP:        Invalid input(s): ALBUMIN    IMAGING:    CT chest abdomen pelvis w contrast    (Results Pending)     Ct Chest Abdomen Pelvis W Contrast    Result Date: 2/26/2021  Narrative: CT CHEST, ABDOMEN AND PELVIS WITH IV CONTRAST INDICATION:   C43 59: Malignant melanoma of other part of trunk  COMPARISON:  None  TECHNIQUE: CT examination of the chest, abdomen and pelvis was performed    In addition to portal venous phase postcontrast scanning through the abdomen and pelvis, delayed phase postcontrast scanning was performed through the upper abdominal viscera  Axial, sagittal, and coronal 2D reformatted images were created from the source data and submitted for interpretation  Radiation dose length product (DLP) for this visit:  1016 9 mGy-cm   This examination, like all CT scans performed in the [facility], was performed utilizing techniques to minimize radiation dose exposure, including the use of iterative  reconstruction and automated exposure control  IV Contrast:  100 mL of iohexol (OMNIPAQUE) Enteric Contrast: Enteric contrast was administered  FINDINGS: CHEST LUNGS:  Lungs are clear  There is no tracheal or endobronchial lesion  PLEURA:  Unremarkable  HEART/GREAT VESSELS:  Unremarkable for patient's age  MEDIASTINUM AND MAGALYS:  Unremarkable  CHEST WALL AND LOWER NECK:  Soft tissue defect containing packing material in the left upper back, consistent with recent melanoma resection  No residual mass or masslike enhancement in the resection bed  Surgical changes in the left axilla  ABDOMEN LIVER/BILIARY TREE:  One or more subcentimeter sharply circumscribed low-density hepatic lesion(s) are noted, too small to accurately characterize, but statistically most likely to represent subcentimeter hepatic cysts  No suspicious solid hepatic lesion is identified  Hepatic contours are normal   No biliary dilatation  GALLBLADDER:  No calcified gallstones  No pericholecystic inflammatory change  SPLEEN:  Unremarkable  PANCREAS:  Unremarkable  ADRENAL GLANDS:  Unremarkable  KIDNEYS/URETERS:  One or more simple renal cyst(s) is noted  Otherwise unremarkable kidneys  No hydronephrosis  STOMACH AND BOWEL:  No bowel obstruction  There is colonic diverticulosis without evidence of acute diverticulitis  APPENDIX:  Normal  ABDOMINOPELVIC CAVITY:  No ascites  No pneumoperitoneum  No lymphadenopathy   VESSELS: Unremarkable for patient's age  PELVIS REPRODUCTIVE ORGANS:  Unremarkable for patient's age  URINARY BLADDER:  Unremarkable  ABDOMINAL WALL/INGUINAL REGIONS:  Unremarkable  OSSEOUS STRUCTURES:  No acute fracture or destructive osseous lesion  Chronic bilateral L3 spondylolysis without spondylolisthesis  Impression: No evidence of metastatic disease in the chest, abdomen, or pelvis  Surgical changes in the left upper back soft tissues and left axilla   Workstation performed: [id number]

## 2021-08-13 ENCOUNTER — HOSPITAL ENCOUNTER (OUTPATIENT)
Dept: ULTRASOUND IMAGING | Facility: HOSPITAL | Age: 46
Discharge: HOME | End: 2021-08-13
Payer: COMMERCIAL

## 2021-08-13 DIAGNOSIS — C43.59 MALIGNANT MELANOMA OF UPPER BACK (HCC): ICD-10-CM

## 2021-08-13 PROCEDURE — 76882 US LMTD JT/FCL EVL NVASC XTR: CPT

## 2021-08-27 ENCOUNTER — OFFICE VISIT (OUTPATIENT)
Dept: SURGICAL ONCOLOGY | Facility: CLINIC | Age: 46
End: 2021-08-27
Payer: COMMERCIAL

## 2021-08-27 VITALS
RESPIRATION RATE: 18 BRPM | DIASTOLIC BLOOD PRESSURE: 96 MMHG | SYSTOLIC BLOOD PRESSURE: 184 MMHG | WEIGHT: 214 LBS | HEART RATE: 96 BPM | BODY MASS INDEX: 32.43 KG/M2 | TEMPERATURE: 98.9 F | OXYGEN SATURATION: 98 % | HEIGHT: 68 IN

## 2021-08-27 DIAGNOSIS — C43.59 MALIGNANT MELANOMA OF UPPER BACK (HCC): Primary | ICD-10-CM

## 2021-08-27 PROCEDURE — 99213 OFFICE O/P EST LOW 20 MIN: CPT | Performed by: SURGERY

## 2021-08-27 NOTE — PROGRESS NOTES
Surgical Oncology Follow Up       [facility] SURGICAL ONCOLOGY RONNIISA  [facility] [address]    Silvia Kim  1975  [id number]  [facility] SURGICAL ONCOLOGY [facility] [address]    Chief Complaint   Patient presents with    Follow-up       Assessment/Plan:    No problem-specific Assessment & Plan notes found for this encounter  Diagnoses and all orders for this visit:    Malignant melanoma of upper back ([address] )  -     US extremity soft tissue; Future        Advance Care Planning/Advance Directives:  Discussed disease status, cancer treatment plans and/or cancer treatment goals with the patient       Oncology History   Malignant melanoma of upper back ([address] )   1/4/2020 Biopsy    Left upper back shave biopsy:  - Malignant melanoma 2 3mm    Mitosis: 6  Ulceration: not identified     2/2/2021 Surgery    Wide excision with sentinel lymph node biopsy  TUMOR   Tumor Site  Skin of trunk: back         Histologic Type  Superficial spreading melanoma (low-cumulative sun damage (CSD) melanoma)    Maximum Tumor (Breslow) Thickness (Millimeters)  2 2 mm   Macroscopic Satellite Nodule(s)  Not identified    Ulceration  Not identified    Anatomic (Galdino) Level  IV (Melanoma invades reticular dermis)    Mitotic Rate  6 mitoses / mm2   Microsatellite(s)  Not identified    Lymphovascular Invasion  Not identified    Neurotropism  Not identified    Tumor-Infiltrating Lymphocytes  Present, nonbrisk    Tumor Regression  Not identified    MARGINS     Peripheral Margins  Negative for invasive melanoma    Distance of Invasive Melanoma from Closest Peripheral Margin (Millimeters)  13 mm   Location  9 o'clock    Status of Melanoma in situ at Peripheral Margins  Cannot be assessed: no rsidual melanoma in-situ seen    Deep Margin  Negative for invasive melanoma    Distance of Invasive Melanoma from Deep Margin (Millimeters)  18 mm   Status of Melanoma in situ at Deep Margin  Cannot be assessed    LYMPH NODES     Regional Lymph Nodes  Involved by tumor cells    Total Number of Lymph Nodes Involved  2    Location  Subcapsular      Intramedullary    Number of [place] Lymph Nodes Involved  2    Size of Largest Metastatic Deposit in [place] Lymph Node (Millimeters)  1 3 mm   Extranodal Extension  Not identified    Matted Nodes  Not identified    Total Number of Lymph Nodes Examined  11    Number of [place] Nodes Examined  11    PATHOLOGIC STAGE CLASSIFICATION (pTNM, AJCC 8th Edition)  Classification assigned in this report includes information from a prior procedure: [id number]    Primary Tumor (pT)  pT3a    Regional Lymph Nodes (pN)  pN2a         4/8/2021 Genomic Testing    Decision DX: Class 1B     4/2021 -  Chemotherapy     Tafinalar 150 mg b i d  Mekinist 2 mg p o  Daily         History of Present Illness:   Patient with history of stage III melanoma of back post excision, with sentinel node biopsy  One of 10 lymph nodes positive   -Interval History:  He is presently on Tafinlar and Mekinist per Dr Arturo Moser  He is doing well  He is here for follow-up status post recent ultrasound of area around left axilla for was thought to be an abnormal lymph node  He does not feel it himself and he checks on a daily basis  Review of Systems:  Review of Systems   Constitutional: Negative  HENT: Negative  Eyes: Negative  Respiratory: Negative  Cardiovascular: Negative  Gastrointestinal: Negative  Endocrine: Negative  Genitourinary: Negative  Musculoskeletal: Negative  Skin: Negative  Allergic/Immunologic: Negative  Neurological: Negative  Hematological: Negative  Psychiatric/Behavioral: Negative  Patient Active Problem List   Diagnosis    Malignant melanoma of upper back ([facility] )    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 ([facility] )     No past medical history on file    Past Surgical History:   Procedure Laterality Date    LYMPH NODE BIOPSY N/A 2/2/2021    Procedure: BIOPSY LYMPH NODE LEFT AXILLARY SENTINEL;  Surgeon: Antonio Lennon MD;  [facility];  Service: Surgical Oncology    NO PAST SURGERIES      SKIN LESION EXCISION Left 2/2/2021    Procedure: WIDE EXCISION MELANOMA SCAR UPPER BACK, intraoperative lymphatic mapping, sentinel lymph node biopsy; [address];  Surgeon: Antonio Lennon MD;  [facility];  Service: Surgical Oncology     Family History   Problem Relation Age of Onset    Basal cell carcinoma Father     No Known Problems Mother     Breast cancer Paternal Aunt 43    Breast cancer Paternal Grandmother [de-identified]     Social History     Socioeconomic History    Marital status: /Civil Union     Spouse name: Not on file    Number of children: Not on file    Years of education: Not on file    Highest education level: Not on file   Occupational History    Not on file   Tobacco Use    Smoking status: Never Smoker    Smokeless tobacco: Never Used   Vaping Use    Vaping Use: Never used   Substance and Sexual Activity    Alcohol use: Yes    Drug use: Never    Sexual activity: Not on file   Other Topics Concern    Not on file   Social History Narrative    Not on file     Social Determinants of Health     Financial Resource Strain:     Difficulty of Paying Living Expenses:    Food Insecurity:     Worried About Running Out of Food in the Last Year:     [address] in the Last Year:    Transportation Needs:     Lack of Transportation (Medical):      Lack of Transportation (Non-Medical):    Physical Activity:     Days of Exercise per Week:     Minutes of Exercise per Session:    Stress:     Feeling of Stress :    Social Connections:     Frequency of Communication with Friends and Family:     Frequency of Social Gatherings with Friends and Family:     Attends Anabaptist Services:     Active Member of Clubs or Organizations:     Attends Club or Organization Meetings:    [facility] Marital Status:    Intimate Partner Violence:     Fear of Current or Ex-Partner:     Emotionally Abused:     Physically Abused:     Sexually Abused:        Current Outpatient Medications:     dabrafenib (Tafinlar) 75 MG capsule, Take 2 capsules by mouth 2 times a day  Take on empty stomach , Disp: 120 capsule, Rfl: 2    Trametinib Dimethyl Sulfoxide (Mekinist) 2 MG TABS, Take 1 tablet by mouth daily  , Disp: 30 tablet, Rfl: 2  No Known Allergies  Vitals:    08/27/21 1419   BP: (!) 184/96   Pulse: 96   Resp: 18   Temp: 98 9 °F (37 2 °C)   SpO2: 98%       Physical Exam  Vitals reviewed  Constitutional:       Appearance: Normal appearance  HENT:      Head: Normocephalic and atraumatic  Right Ear: External ear normal       Left Ear: External ear normal    Eyes:      General: No scleral icterus  Extraocular Movements: Extraocular movements intact  Pupils: Pupils are equal, round, and reactive to light  Cardiovascular:      Rate and Rhythm: Normal rate and regular rhythm  Heart sounds: Normal heart sounds  Pulmonary:      Breath sounds: Normal breath sounds  Lymphadenopathy:      Cervical: No cervical adenopathy  Skin:     General: Skin is warm and dry  Comments: Well-healed back excision site without evidence of recurrence visible or palpable  No palpable axillary adenopathy  Neurological:      Mental Status: He is alert  Results:  Labs:  CBC, Coags, BMP, Mg, Phos     Imaging  US extremity soft tissue    Result Date: 8/20/2021  Narrative: SUPERFICIAL SOFT TISSUE ULTRASOUND INDICATION:   C43 59: Malignant melanoma of other part of trunk  COMPARISON:  Axillary ultrasound 6/23/2021 TECHNIQUE:   Real-time ultrasound of the left axilla was performed with a linear transducer with both volumetric sweeps and still imaging techniques   FINDINGS:  Again seen is an ill-defined region which is subtly hypoechoic to regional subcutaneous tissue roughly 1 cm deep to the skin located inferomedial to the scar which somewhat blends into the subcutaneous fatty tissue  This remains grossly stable  in size measuring 1 4 x 0 4 x 0 8 cm accounting for difference in technique  Impression: Ill-defined subcutaneous slightly hypoechoic region remaining grossly stable in size accounting for difference in technique  This could represent an abnormal lymph node or possibly regional soft tissue architectural distortion  PET/CT could be considered  for further clarification  The study was marked in EPIC for significant notification  Workstation performed: [id number]     I reviewed the above laboratory and imaging data  Discussion/Summary:  Possible lymph node, 1 4 cm in greatest diameter, subcutaneous tissue adjacent to scar of left sentinel node biopsy  Previous biopsy was planned, though this was too small to be palpated  We opted for serial imaging  Presently patient is on systemic therapy for melanoma  We will therefore plan of follow-up in 3-4 months with repeat imaging at that time to see if this left axillary subcutaneous node is still present or stable, or if it grows or changes  I advised him to get back in touch with us should he notice any changes at the site, I e  if noted was palpable  Would reconsider biopsy at that time

## 2021-09-07 ENCOUNTER — TELEPHONE (OUTPATIENT)
Dept: HEMATOLOGY ONCOLOGY | Facility: CLINIC | Age: 46
End: 2021-09-07

## 2021-09-07 NOTE — TELEPHONE ENCOUNTER
Patient is calling in indicating that his [facility]  has called him informing him that he owes a bill regarding his two medications but he indicates that he has funding that takes care of it and he only pays a co-pay   The two medications he receives are         Trametinib Dimethyl Sulfoxide (Mekinist) 2 MG TABS     dabrafenib (Tafinlar) 75 MG capsule    He can be reached back at [phone]

## 2021-09-08 ENCOUNTER — DOCUMENTATION (OUTPATIENT)
Dept: HEMATOLOGY ONCOLOGY | Facility: CLINIC | Age: 46
End: 2021-09-08

## 2021-09-08 NOTE — PROGRESS NOTES
9-7-21  Rcvd task from [facility] [place] regarding call from patient regarding current funding  Prior to contacting patient placed call to [id number] with Tracy Emily who stated patients copay for refill is $7040 95 due to patients yearly deductible which reset  Per our conversation she stated that the copay card which was obtained back in March was used due to copay for tafinlar $3022 82 / Arvel Pen $4018  11 which copay card limit was $15,000  Asked Tracy Diaz why was the patient not notified prior to September refill, her response was "it Is not their job to school a patient on their insurance and what their responsibility will be " I informed her that her response was unprofessional, and her  service skills needed to be refreshed  Researched other funding options for the patient  Per [employer] (copay card patient is not eligible to enroll until next year for additional funding )   Call to patient to discuss outcome of call to [facility], asked patient how much medication he had on hand  Patient states he only had 1 day left  He is not sure if he could be off of the medication  Informed patient that I would complete a free drug program application with hopes of enrolling patient for free drug  Also advised patient that I will inform providers office of steps taken as well as inquire if patient could be off medication during the process of application for free drug  PANO Pt  Application completed and submitted Conf# [id number]  [facility] HCP Provider Application also completed Conf# [id number] forwarded for providers signature  Once received back I will fax to [facility]    9-8-21  Rcvd HCP Form from providers office  Faxed to [facility] as urgent request     9-13-21  Spoke with Sue Dunlap @ [facility] who stated the benefits investigation was completed and patients reported yearly copay is $56,450  Advised Radha patient will need to enroll with free drug program  Requested account be transferred to [employer] for processing      9-16-21  For patient Etienne La  [birth year] [facility] / Eugenio Guerrero)    Patient has been approved for the free drug program through [employer] PAP  Eff 9-15-21  Thru 12-31-21    Ladies:  Please forward prescription with additional refills to [employer], [place]  Call to patient to notify of approval, no response left message to inform if pharmacy does not speak with him directly they will not ship     Epic Noted, Email to team

## 2021-09-09 ENCOUNTER — TELEPHONE (OUTPATIENT)
Dept: HEMATOLOGY ONCOLOGY | Facility: CLINIC | Age: 46
End: 2021-09-09

## 2021-09-09 NOTE — TELEPHONE ENCOUNTER
Called and spoke to patient  Due to patient having funding issues to receive his medication, we are able to give patient a one month supply of samples of his Tafinlar and Mekinist  Patient will be up at the [place] office Friday to  his samples

## 2021-09-16 ENCOUNTER — TELEPHONE (OUTPATIENT)
Dept: HEMATOLOGY ONCOLOGY | Facility: CLINIC | Age: 46
End: 2021-09-16

## 2021-09-16 DIAGNOSIS — C43.59 MALIGNANT MELANOMA OF UPPER BACK (HCC): ICD-10-CM

## 2021-09-16 NOTE — TELEPHONE ENCOUNTER
Patient calling to advise he was dx with COVID but he is still taking meds and is doing fine, just wanted to make office aware   He can be reached at [phone]

## 2021-10-19 ENCOUNTER — APPOINTMENT (OUTPATIENT)
Dept: LAB | Facility: HOSPITAL | Age: 46
End: 2021-10-19
Attending: INTERNAL MEDICINE
Payer: COMMERCIAL

## 2021-10-19 ENCOUNTER — HOSPITAL ENCOUNTER (OUTPATIENT)
Dept: CT IMAGING | Facility: HOSPITAL | Age: 46
Discharge: HOME | End: 2021-10-19
Attending: INTERNAL MEDICINE
Payer: COMMERCIAL

## 2021-10-19 DIAGNOSIS — C43.9 MELANOMA METASTATIC TO LYMPH NODE (HCC): ICD-10-CM

## 2021-10-19 DIAGNOSIS — C77.9 MELANOMA METASTATIC TO LYMPH NODE (HCC): ICD-10-CM

## 2021-10-19 DIAGNOSIS — C43.59 MALIGNANT MELANOMA OF UPPER BACK (HCC): ICD-10-CM

## 2021-10-19 LAB
ALBUMIN SERPL BCP-MCNC: 3.9 G/DL (ref 3.5–5.7)
ALP SERPL-CCNC: 89 U/L (ref 40–150)
ALT SERPL W P-5'-P-CCNC: 24 U/L (ref 7–52)
ANION GAP SERPL CALCULATED.3IONS-SCNC: 9 MMOL/L (ref 4–13)
AST SERPL W P-5'-P-CCNC: 21 U/L (ref 13–39)
BASOPHILS # BLD AUTO: 0 THOUSANDS/ΜL (ref 0–0.1)
BASOPHILS NFR BLD AUTO: 1 % (ref 0–2)
BILIRUB SERPL-MCNC: 0.3 MG/DL (ref 0.2–1)
BUN SERPL-MCNC: 20 MG/DL (ref 7–25)
CALCIUM SERPL-MCNC: 9.3 MG/DL (ref 8.6–10.5)
CHLORIDE SERPL-SCNC: 104 MMOL/L (ref 98–107)
CO2 SERPL-SCNC: 25 MMOL/L (ref 21–31)
CREAT SERPL-MCNC: 1.07 MG/DL (ref 0.7–1.3)
EOSINOPHIL # BLD AUTO: 0.3 THOUSAND/ΜL (ref 0–0.61)
EOSINOPHIL NFR BLD AUTO: 5 % (ref 0–5)
ERYTHROCYTE [DISTWIDTH] IN BLOOD BY AUTOMATED COUNT: 13.7 % (ref 11.5–14.5)
GFR SERPL CREATININE-BSD FRML MDRD: 82 ML/MIN/1.73SQ M
GLUCOSE P FAST SERPL-MCNC: 121 MG/DL (ref 65–99)
HCT VFR BLD AUTO: 43.2 % (ref 42–47)
HGB BLD-MCNC: 14.2 G/DL (ref 14–18)
LDH SERPL-CCNC: 193 U/L (ref 84–246)
LYMPHOCYTES # BLD AUTO: 2.4 THOUSANDS/ΜL (ref 0.6–4.47)
LYMPHOCYTES NFR BLD AUTO: 42 % (ref 21–51)
MCH RBC QN AUTO: 30.8 PG (ref 26–34)
MCHC RBC AUTO-ENTMCNC: 32.8 G/DL (ref 31–37)
MCV RBC AUTO: 94 FL (ref 81–99)
MONOCYTES # BLD AUTO: 0.3 THOUSAND/ΜL (ref 0.17–1.22)
MONOCYTES NFR BLD AUTO: 6 % (ref 2–12)
NEUTROPHILS # BLD AUTO: 2.7 THOUSANDS/ΜL (ref 1.4–6.5)
NEUTS SEG NFR BLD AUTO: 47 % (ref 42–75)
PLATELET # BLD AUTO: 220 THOUSANDS/UL (ref 149–390)
PMV BLD AUTO: 8.5 FL (ref 8.6–11.7)
POTASSIUM SERPL-SCNC: 3.5 MMOL/L (ref 3.5–5.5)
PROT SERPL-MCNC: 7.2 G/DL (ref 6.4–8.9)
RBC # BLD AUTO: 4.6 MILLION/UL (ref 4.3–5.9)
SODIUM SERPL-SCNC: 138 MMOL/L (ref 134–143)
WBC # BLD AUTO: 5.7 THOUSAND/UL (ref 4.8–10.8)

## 2021-10-19 PROCEDURE — 36415 COLL VENOUS BLD VENIPUNCTURE: CPT | Performed by: INTERNAL MEDICINE

## 2021-10-19 PROCEDURE — 83615 LACTATE (LD) (LDH) ENZYME: CPT | Performed by: INTERNAL MEDICINE

## 2021-10-19 PROCEDURE — 80053 COMPREHEN METABOLIC PANEL: CPT | Performed by: INTERNAL MEDICINE

## 2021-10-19 PROCEDURE — 85025 COMPLETE CBC W/AUTO DIFF WBC: CPT | Performed by: INTERNAL MEDICINE

## 2021-10-19 PROCEDURE — 74177 CT ABD & PELVIS W/CONTRAST: CPT

## 2021-10-19 PROCEDURE — 71260 CT THORAX DX C+: CPT

## 2021-10-19 RX ADMIN — IOHEXOL 100 ML: 350 INJECTION, SOLUTION INTRAVENOUS at 07:53

## 2021-10-25 ENCOUNTER — TELEPHONE (OUTPATIENT)
Dept: HEMATOLOGY ONCOLOGY | Facility: CLINIC | Age: 46
End: 2021-10-25

## 2021-10-27 ENCOUNTER — OFFICE VISIT (OUTPATIENT)
Dept: HEMATOLOGY ONCOLOGY | Facility: CLINIC | Age: 46
End: 2021-10-27
Payer: COMMERCIAL

## 2021-10-27 VITALS
TEMPERATURE: 98.2 F | BODY MASS INDEX: 31.37 KG/M2 | WEIGHT: 207 LBS | OXYGEN SATURATION: 97 % | SYSTOLIC BLOOD PRESSURE: 178 MMHG | HEART RATE: 115 BPM | HEIGHT: 68 IN | DIASTOLIC BLOOD PRESSURE: 100 MMHG

## 2021-10-27 DIAGNOSIS — C43.9 MELANOMA METASTATIC TO LYMPH NODE (HCC): Primary | ICD-10-CM

## 2021-10-27 DIAGNOSIS — C77.9 MELANOMA METASTATIC TO LYMPH NODE (HCC): Primary | ICD-10-CM

## 2021-10-27 PROCEDURE — 99214 OFFICE O/P EST MOD 30 MIN: CPT | Performed by: INTERNAL MEDICINE

## 2021-11-05 ENCOUNTER — TELEPHONE (OUTPATIENT)
Dept: SURGICAL ONCOLOGY | Facility: CLINIC | Age: 46
End: 2021-11-05

## 2021-11-05 ENCOUNTER — HOSPITAL ENCOUNTER (OUTPATIENT)
Dept: RADIOLOGY | Age: 46
Discharge: HOME | End: 2021-11-05
Payer: COMMERCIAL

## 2021-11-05 DIAGNOSIS — C43.59 MALIGNANT MELANOMA OF UPPER BACK (HCC): ICD-10-CM

## 2021-11-05 LAB — GLUCOSE SERPL-MCNC: 118 MG/DL (ref 65–140)

## 2021-11-05 PROCEDURE — 82948 REAGENT STRIP/BLOOD GLUCOSE: CPT

## 2021-11-05 PROCEDURE — A9552 F18 FDG: HCPCS

## 2021-11-05 PROCEDURE — 78815 PET IMAGE W/CT SKULL-THIGH: CPT

## 2021-11-20 ENCOUNTER — HOSPITAL ENCOUNTER (OUTPATIENT)
Dept: ULTRASOUND IMAGING | Facility: HOSPITAL | Age: 46
Discharge: HOME | End: 2021-11-20
Payer: COMMERCIAL

## 2021-11-20 DIAGNOSIS — C43.59 MALIGNANT MELANOMA OF UPPER BACK (HCC): ICD-10-CM

## 2021-11-20 PROCEDURE — 76882 US LMTD JT/FCL EVL NVASC XTR: CPT

## 2021-11-30 ENCOUNTER — TELEPHONE (OUTPATIENT)
Dept: HEMATOLOGY ONCOLOGY | Facility: CLINIC | Age: 46
End: 2021-11-30

## 2021-12-17 ENCOUNTER — TELEPHONE (OUTPATIENT)
Dept: HEMATOLOGY ONCOLOGY | Facility: CLINIC | Age: 46
End: 2021-12-17

## 2021-12-17 ENCOUNTER — OFFICE VISIT (OUTPATIENT)
Dept: SURGICAL ONCOLOGY | Facility: CLINIC | Age: 46
End: 2021-12-17
Payer: COMMERCIAL

## 2021-12-17 VITALS
RESPIRATION RATE: 16 BRPM | BODY MASS INDEX: 32.58 KG/M2 | HEIGHT: 68 IN | WEIGHT: 215 LBS | SYSTOLIC BLOOD PRESSURE: 170 MMHG | DIASTOLIC BLOOD PRESSURE: 98 MMHG | OXYGEN SATURATION: 97 % | HEART RATE: 102 BPM | TEMPERATURE: 98.2 F

## 2021-12-17 DIAGNOSIS — C43.59 MALIGNANT MELANOMA OF UPPER BACK (HCC): Primary | ICD-10-CM

## 2021-12-17 PROCEDURE — 99214 OFFICE O/P EST MOD 30 MIN: CPT | Performed by: SURGERY

## 2021-12-31 ENCOUNTER — DOCUMENTATION (OUTPATIENT)
Dept: HEMATOLOGY ONCOLOGY | Facility: CLINIC | Age: 46
End: 2021-12-31

## 2021-12-31 NOTE — PROGRESS NOTES
Rcvd renewal application from provider and patient  All signatures and required documents on file  Application faxed to [address] for re-enrollment for assistance with [facility]     1-27-22  Per the letter [facility] from [facility] dated 1-27-22  they have the re-enrollment application on file  They are missing updated income information from the patient which they have also requested from him  This is something he needs to provide them  Will reach out to the patient to inform this information needs to be submitted    2-8-22  Follow up call regarding income information needed for [employer] renewal  No response left message for return call  2-15-22  Voicemail message received from patient  Returning my call regarding documents  Call to patient, straight to voicemail  Left message    2-23-22  Follow up call, left message with reason for my call  Left all contact information for return call  Call to [facility] spoke with Ted Hart who stated they have not received patient portion  Once information rcvd they will continue to process  3-8-22  Rcvd message from providers office regarding [employer] application  Per RN call was placed to [facility] regarding prescription for Mekinist, Per [facility] they received income information which providers office submitted last week, however per rep mekinist was not on re-enrollment application  Unable to fill until application is submitted  Advised providers office I would call [facility], call placed to [facility] Per my conversation with Julissa Denis, yes Mekinist was missed on renewal application submitted back in December, however a new prescription form would have to be submitted because Dr Tariq Boyer was the physician who signed as the prescriber  Also, If you could please inform me of any documents you receive for any patient regarding funding it would be greatly appreciated   Per your notes, you submitted income information which I had pending on my desk with no notification to me or the team  New prescriber form completed and forwarded for signature  3-14-22  Rcvd notice from [employer] application is still missing information  Patient information, signature and income are still missing  Per providers office Maria Teresa, this information was faxed by her at providers office a few weeks ago  Information requested faxed to [employer]    3-22-22  Per [facility]) Patient was approved for Sherley Jorgensen 3-9-22 thru 12-31-22  Mekinist- is pending prior auth   I will inform Emily Mark  However they have been calling the patient to send both medications as he has an active script on file but the patient has not returned their calls  Patient needs to call [employer] [phone]      3-23-22   Mekinist auth rcvd and faxed to [employer] as requested    3-30-22  Follow up call to [employer]  Application is still in review however patient received & signed for both medications yesterday via [employer]: tracking #'s as follows  Carline Him: [id number]  GALLOT: [id number]    Funding notes updated

## 2022-01-06 ENCOUNTER — TELEPHONE (OUTPATIENT)
Dept: HEMATOLOGY ONCOLOGY | Facility: CLINIC | Age: 47
End: 2022-01-06

## 2022-01-06 NOTE — TELEPHONE ENCOUNTER
Reschedule Appointment     Who is calling in  Patient   Doctor Appointment Scheduled with Erlinda Schwartz date and time 1-27-22 @ 8:20am    New date and time 1-28-22 @ 2:20pm   Location [place]   Patient verbalized understanding

## 2022-01-20 ENCOUNTER — APPOINTMENT (OUTPATIENT)
Dept: LAB | Facility: HOSPITAL | Age: 47
End: 2022-01-20
Payer: COMMERCIAL

## 2022-01-24 ENCOUNTER — HOSPITAL ENCOUNTER (OUTPATIENT)
Dept: CT IMAGING | Facility: HOSPITAL | Age: 47
Discharge: HOME | End: 2022-01-24
Attending: INTERNAL MEDICINE
Payer: COMMERCIAL

## 2022-01-24 DIAGNOSIS — C77.9 MELANOMA METASTATIC TO LYMPH NODE (HCC): ICD-10-CM

## 2022-01-24 DIAGNOSIS — C43.9 MELANOMA METASTATIC TO LYMPH NODE (HCC): ICD-10-CM

## 2022-01-24 PROCEDURE — 71260 CT THORAX DX C+: CPT

## 2022-01-24 PROCEDURE — 74177 CT ABD & PELVIS W/CONTRAST: CPT

## 2022-01-24 RX ADMIN — IOHEXOL 100 ML: 350 INJECTION, SOLUTION INTRAVENOUS at 07:48

## 2022-01-28 ENCOUNTER — OFFICE VISIT (OUTPATIENT)
Dept: HEMATOLOGY ONCOLOGY | Facility: CLINIC | Age: 47
End: 2022-01-28
Payer: COMMERCIAL

## 2022-01-28 VITALS
SYSTOLIC BLOOD PRESSURE: 170 MMHG | TEMPERATURE: 98.9 F | RESPIRATION RATE: 17 BRPM | WEIGHT: 218 LBS | HEART RATE: 114 BPM | BODY MASS INDEX: 33.04 KG/M2 | OXYGEN SATURATION: 97 % | HEIGHT: 68 IN | DIASTOLIC BLOOD PRESSURE: 98 MMHG

## 2022-01-28 DIAGNOSIS — C43.59 MALIGNANT MELANOMA OF UPPER BACK (HCC): Primary | ICD-10-CM

## 2022-01-28 DIAGNOSIS — C77.9 MELANOMA METASTATIC TO LYMPH NODE (HCC): ICD-10-CM

## 2022-01-28 DIAGNOSIS — I10 HYPERTENSION, UNSPECIFIED TYPE: ICD-10-CM

## 2022-01-28 DIAGNOSIS — C43.9 MELANOMA METASTATIC TO LYMPH NODE (HCC): ICD-10-CM

## 2022-01-28 DIAGNOSIS — Z79.899 HIGH RISK MEDICATIONS (NOT ANTICOAGULANTS) LONG-TERM USE: ICD-10-CM

## 2022-01-28 PROCEDURE — 99215 OFFICE O/P EST HI 40 MIN: CPT | Performed by: INTERNAL MEDICINE

## 2022-01-28 RX ORDER — LISINOPRIL 10 MG/1
10 TABLET ORAL DAILY
Qty: 30 TABLET | Refills: 3 | Status: SHIPPED | OUTPATIENT
Start: 2022-01-28 | End: 2022-02-16 | Stop reason: ALTCHOICE

## 2022-01-28 RX ORDER — AMLODIPINE BESYLATE 5 MG/1
5 TABLET ORAL DAILY
Qty: 30 TABLET | Refills: 3 | Status: SHIPPED | OUTPATIENT
Start: 2022-01-28 | End: 2022-03-02 | Stop reason: SDUPTHER

## 2022-01-28 NOTE — LETTER
February 4, 2022     Parvez Pereira MD  [address] [facility] [address]  [address] [place] [address] 960 [facility]    Patient: Janene Mcdermott   YOB: 1975   Date of Visit: 1/28/2022       Dear Dr Girma Rock: Thank you for referring Devaughn Scruggs to me for evaluation  Below are my notes for this consultation  If you have questions, please do not hesitate to call me  I look forward to following your patient along with you  Sincerely,        Alyssia Patel MD        CC: Yaima Liang, Quan Santos MD  2/4/2022 10:41 AM  Sign when Signing Visit  [address] [place] [address] 58  [phone]  [phone]     Date of Visit: 1/28/2022  Name: Janene Mcdermott   YOB: 1975       Subjective    VISIT DIAGNOSIS:  Diagnoses and all orders for this visit:    Malignant melanoma of upper back ([facility] )  -     ECG 12 lead; Future  -     Echo complete w/ contrast if indicated; Future  -     CBC and differential; Standing  -     Comprehensive metabolic panel; Standing  -     LD,Blood; Standing  -     Ambulatory referral to [facility]; Future  -     MRI brain w wo contrast; Future  -     CBC and differential  -     Comprehensive metabolic panel  -     LD,Blood  -     Ambulatory Referral to Dermatology; Future    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 ([facility] )    Hypertension, unspecified type  -     Ambulatory referral to [facility]; Future  -     lisinopril (ZESTRIL) 10 mg tablet; Take 1 tablet (10 mg total) by mouth daily  -     amLODIPine (NORVASC) 5 mg tablet; Take 1 tablet (5 mg total) by mouth daily    High risk medications (not anticoagulants) long-term use  -     ECG 12 lead; Future  -     Echo complete w/ contrast if indicated; Future  -     CBC and differential; Standing  -     Comprehensive metabolic panel; Standing  -     LD,Blood; Standing  -     Ambulatory referral to [facility];  Future  -     MRI brain w wo contrast; Future  -     CBC and differential  -     Comprehensive metabolic panel  -     LD,Blood        Oncology History   Malignant melanoma of upper back ([address] )   1/4/2020 Biopsy    Left upper back shave biopsy:  - Malignant melanoma 2 3mm    Mitosis: 6  Ulceration: not identified     2/2/2021 Surgery    Wide excision with sentinel lymph node biopsy     2/2/2021 -  Cancer Staged    Staging form: Melanoma of the Skin, AJCC 8th Edition  - Pathologic stage from 2/2/2021: Stage IIIB (pT3a, pN2a, cM0) - Signed by Juliane Julio MD on 2/4/2022  Stage prefix: Initial diagnosis       4/8/2021 Genomic Testing    Decision DX: Class 1B     4/2021 -  Chemotherapy     Tafinalar 150 mg b i d  Mekinist 2 mg p o  Daily        Cancer Staging  Malignant melanoma of upper back [facility])  Staging form: Melanoma of the Skin, AJCC 8th Edition  - Pathologic stage from 2/2/2021: Stage IIIB (pT3a, pN2a, cM0)       HISTORY OF PRESENT ILLNESS: Gwendalyn Rinne is a 48 y o  male  who is seen in consultation for a CONNOR from Dr Miguel Ragsdale for a diagnosis of melanoma   The history is obtained from the patient, review of records, and discussion with Dr Miguel Ragsdale       He states that the lesion on his upper back had been there for a few years  He described it as being the size of a pencil eraser, slightly raised, and flesh colored  November/December 2020 he states that the lesion opened and wouldn't heal    Denied itching, bleeding, or pain of the lesion  Nothing made it worse or better  He did not have routine dermatology exams prior to now  He saw dermatology and underwent a biopsy on 1/21/21 of the back lesion which demonstrated melanoma 2 2 mm thick, negative for ulceration, 6 mitoses/mm2  He then had a WLE and SLNBx with Dr Casimiro Coon  Pathology demonstrated residual melanoma 1 3 mm thick, negative for ulceration, 6 mitoses/mm2, and 2 lymph nodes positive for melanoma with the largest deposit 1 3mm  Final stage of Stage IIIB ([id number]) melanoma    His tumor was tested for BRAF mutation and it was positive for V600E  He was started on adjuvant treatment with dabrafenib and trametinib and is doing well and tolerating treatment  Today, he has no complaints and feels good  Denies any side effects - no rash, itching, arthralgias, myalgias, fevers or chills  He denies any other new, changing, or concerning lesions at this time  I independently reviewed his imaging and his recent CT scans from 1/24/2022 were negative for melanoma recurrence or metastasis  He denies a previous history of BCC/SCC  He describes having average sun exposure when younger  He describes having an increased number of sunburns  He describes having 1-2 blistering sunburns  He denies tanning bed use  He had blonde hair when he was younger, has blue eyes, and his ancestry is [facility]  He has a family history of [address] in his father, breast cancer in his paternal grandmother [de-identified][de-identified] y o)  Denies family history of melanoma, ovarian, or pancreatic cancer  15 and 8year-old daughters  He has not had a colonoscopy or PSA check  Denies smoking, occasional alcohol, and no drug use  REVIEW OF SYSTEMS:  Review of Systems   Constitutional: Negative for appetite change, fatigue, fever and unexpected weight change  HENT:   Negative for lump/mass  Eyes: Negative for icterus  Respiratory: Negative for cough, shortness of breath and wheezing  Cardiovascular: Negative for leg swelling  Gastrointestinal: Negative for abdominal pain, constipation, diarrhea, nausea and vomiting  Genitourinary: Negative for difficulty urinating and hematuria  Musculoskeletal: Negative for arthralgias, gait problem and myalgias  Skin: Negative for itching and rash  No new, changing, or concerning lesions  Neurological: Negative for extremity weakness, gait problem, headaches, light-headedness and numbness  Hematological: Negative for adenopathy          MEDICATIONS:    Current Outpatient Medications:     amLODIPine (NORVASC) 5 mg tablet, Take 1 tablet (5 mg total) by mouth daily, Disp: 30 tablet, Rfl: 3    dabrafenib (TAFINLAR) 75 MG capsule, Take 2 capsules (150 mg total) by mouth 2 (two) times a day Take on an empty stomach, Disp: 120 capsule, Rfl: 2    lisinopril (ZESTRIL) 10 mg tablet, Take 1 tablet (10 mg total) by mouth daily, Disp: 30 tablet, Rfl: 3    Trametinib Dimethyl Sulfoxide 2 MG TABS, Take 1 tablet (2 mg total) by mouth daily, Disp: 30 tablet, Rfl: 2     ALLERGIES:  No Known Allergies     ACTIVE PROBLEMS:  Patient Active Problem List   Diagnosis    Malignant melanoma of upper back ([facility] )    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 ([facility] )          PAST MEDICAL HISTORY:   History reviewed  No pertinent past medical history  PAST SURGICAL HISTORY:  Past Surgical History:   Procedure Laterality Date    LYMPH NODE BIOPSY N/A 2/2/2021    Procedure: BIOPSY LYMPH NODE LEFT AXILLARY SENTINEL;  Surgeon: Precious Grayson MD;  [facility]: [facility];  Service: Surgical Oncology    NO PAST SURGERIES      SKIN LESION EXCISION Left 2/2/2021    Procedure: WIDE EXCISION MELANOMA SCAR UPPER BACK, intraoperative lymphatic mapping, sentinel lymph node biopsy; [address];  Surgeon: Precious Grayson MD;  [facility]: [facility];  Service: Surgical Oncology        SOCIAL HISTORY:  Social History     Socioeconomic History    Marital status: /Civil Union     Spouse name: None    Number of children: None    Years of education: None    Highest education level: None   Occupational History    None   Tobacco Use    Smoking status: Never Smoker    Smokeless tobacco: Never Used   Vaping Use    Vaping Use: Never used   Substance and Sexual Activity    Alcohol use:  Yes    Drug use: Never    Sexual activity: None   Other Topics Concern    None   Social History Narrative    None     Social Determinants of Health     Financial Resource Strain: Not on file   Food Insecurity: Not on file Transportation Needs: Not on file   Physical Activity: Not on file   Stress: Not on file   Social Connections: Not on file   Intimate Partner Violence: Not on file   Housing Stability: Not on file        FAMILY HISTORY:  Family History   Problem Relation Age of Onset    Basal cell carcinoma Father     No Known Problems Mother     Breast cancer Paternal Aunt 43    Breast cancer Paternal Grandmother [de-identified]          Objective    PHYSICAL EXAMINATION:   Blood pressure 170/98, pulse (!) 114, temperature 98 9 °F (37 2 °C), resp  rate 17, height 5' 8" (1 727 m), weight 98 9 kg (218 lb), SpO2 97 %  Pain Score: 0-No pain     ECOG Performance Status      Most Recent Value   ECOG Performance Status 0 - Fully active, able to carry on all pre-disease performance without restriction             Physical Exam  Constitutional:       General: He is not in acute distress  Appearance: Normal appearance  He is not toxic-appearing  HENT:      Mouth/Throat:      Mouth: Mucous membranes are moist       Pharynx: Oropharynx is clear  Eyes:      General: No scleral icterus  Cardiovascular:      Rate and Rhythm: Normal rate and regular rhythm  Pulses: Normal pulses  Heart sounds: No murmur heard  No friction rub  No gallop  Pulmonary:      Effort: Pulmonary effort is normal  No respiratory distress  Breath sounds: Normal breath sounds  No wheezing or rales  Chest:   Breasts:      Right: No axillary adenopathy or supraclavicular adenopathy  Left: No axillary adenopathy or supraclavicular adenopathy  Abdominal:      General: There is no distension  Palpations: There is no mass  Tenderness: There is no abdominal tenderness  There is no rebound  Musculoskeletal:         General: No swelling or tenderness  Right lower leg: No edema  Left lower leg: No edema  Lymphadenopathy:      Head:      Right side of head: No submandibular, preauricular or posterior auricular adenopathy  Left side of head: No submandibular, preauricular or posterior auricular adenopathy  Cervical: No cervical adenopathy  Right cervical: No superficial or posterior cervical adenopathy  Left cervical: No superficial or posterior cervical adenopathy  Upper Body:      Right upper body: No supraclavicular or axillary adenopathy  Left upper body: No supraclavicular or axillary adenopathy  Lower Body: No right inguinal adenopathy  No left inguinal adenopathy  Skin:     Findings: No rash  Comments: Well healed surgical scar  No evidence of recurrence at primary site  Neurological:      General: No focal deficit present  Mental Status: He is alert and oriented to person, place, and time  Psychiatric:         Mood and Affect: Mood normal          Behavior: Behavior normal          Thought Content: Thought content normal          Judgment: Judgment normal          I reviewed lab data in the chart      WBC   Date Value Ref Range Status   01/20/2022 4 86 4 31 - 10 16 Thousand/uL Final   10/19/2021 5 70 4 80 - 10 80 Thousand/uL Final   06/09/2021 4 70 (L) 4 80 - 10 80 Thousand/uL Final     Hemoglobin   Date Value Ref Range Status   01/20/2022 14 2 12 0 - 17 0 g/dL Final   10/19/2021 14 2 14 0 - 18 0 g/dL Final   06/09/2021 14 5 14 0 - 18 0 g/dL Final     Platelets   Date Value Ref Range Status   01/20/2022 168 149 - 390 Thousands/uL Final   10/19/2021 220 149 - 390 Thousands/uL Final   06/09/2021 157 149 - 390 Thousands/uL Final     MCV   Date Value Ref Range Status   01/20/2022 95 82 - 98 fL Final   10/19/2021 94 81 - 99 fL Final   06/09/2021 92 81 - 99 fL Final      Potassium   Date Value Ref Range Status   01/20/2022 3 5 3 5 - 5 3 mmol/L Final   10/19/2021 3 5 3 5 - 5 5 mmol/L Final   06/09/2021 3 8 3 5 - 5 5 mmol/L Final     Chloride   Date Value Ref Range Status   01/20/2022 106 96 - 108 mmol/L Final   10/19/2021 104 98 - 107 mmol/L Final   06/09/2021 103 98 - 107 mmol/L Final CO2   Date Value Ref Range Status   01/20/2022 26 21 - 32 mmol/L Final   10/19/2021 25 21 - 31 mmol/L Final   06/09/2021 25 21 - 31 mmol/L Final     BUN   Date Value Ref Range Status   01/20/2022 18 5 - 25 mg/dL Final   10/19/2021 20 7 - 25 mg/dL Final   06/09/2021 21 7 - 25 mg/dL Final     Creatinine   Date Value Ref Range Status   01/20/2022 1 37 (H) 0 60 - 1 30 mg/dL Final     Comment:     Standardized to IDMS reference method   10/19/2021 1 07 0 70 - 1 30 mg/dL Final     Comment:     Standardized to IDMS reference method   06/09/2021 1 20 0 70 - 1 30 mg/dL Final     Comment:     Standardized to IDMS reference method     Calcium   Date Value Ref Range Status   01/20/2022 9 0 8 4 - 10 2 mg/dL Final   10/19/2021 9 3 8 6 - 10 5 mg/dL Final   06/09/2021 9 8 8 6 - 10 5 mg/dL Final     Albumin   Date Value Ref Range Status   01/20/2022 4 3 3 5 - 5 0 g/dL Final   10/19/2021 3 9 3 5 - 5 7 g/dL Final   06/09/2021 4 0 3 5 - 5 7 g/dL Final     Total Bilirubin   Date Value Ref Range Status   01/20/2022 0 34 0 20 - 1 00 mg/dL Final     Comment:     Use of this assay is not recommended for patients undergoing treatment with eltrombopag due to the potential for falsely elevated results  10/19/2021 0 30 0 20 - 1 00 mg/dL Final   06/09/2021 0 30 0 20 - 1 00 mg/dL Final     Alkaline Phosphatase   Date Value Ref Range Status   01/20/2022 59 34 - 104 U/L Final   10/19/2021 89 40 - 150 U/L Final   06/09/2021 67 40 - 150 U/L Final     AST   Date Value Ref Range Status   01/20/2022 20 13 - 39 U/L Final     Comment:     Specimen collection should occur prior to Sulfasalazine administration due to the potential for falsely depressed results  10/19/2021 21 13 - 39 U/L Final     Comment:     Specimen collection should occur prior to Sulfasalazine administration due to the potential for falsely depressed results      06/09/2021 27 13 - 39 U/L Final     Comment:     Specimen collection should occur prior to Sulfasalazine administration due to the potential for falsely depressed results  ALT   Date Value Ref Range Status   01/20/2022 20 7 - 52 U/L Final     Comment:     Specimen collection should occur prior to Sulfasalazine administration due to the potential for falsely depressed results  10/19/2021 24 7 - 52 U/L Final     Comment:     Specimen collection should occur prior to Sulfasalazine administration due to the potential for falsely depressed results  06/09/2021 35 7 - 52 U/L Final     Comment:     Specimen collection should occur prior to Sulfasalazine administration due to the potential for falsely depressed results  LD   Date Value Ref Range Status   01/20/2022 160 140 - 271 U/L Final   10/19/2021 193 84 - 246 U/L Final     LDH   Date Value Ref Range Status   02/26/2021 200 121 - 224 IU/L Final     No results found for: TSH  No results found for: S5YQCVA   No results found for: FREET4      RECENT IMAGING:  Procedure: CT chest abdomen pelvis w contrast    Result Date: 1/25/2022  Narrative: CT CHEST, ABDOMEN AND PELVIS WITH IV CONTRAST INDICATION:   C43 9: Malignant melanoma of skin, unspecified C77 9: Secondary and unspecified malignant neoplasm of lymph node, unspecified  COMPARISON:  CT chest abdomen pelvis 10/19/2021  PET CT 11/5/2021  TECHNIQUE: CT examination of the chest, abdomen and pelvis was performed  Axial, sagittal, and coronal 2D reformatted images were created from the source data and submitted for interpretation  Radiation dose length product (DLP) for this visit:  994 04 mGy-cm   This examination, like all CT scans performed in the [facility], was performed utilizing techniques to minimize radiation dose exposure, including the use of iterative  reconstruction and automated exposure control  IV Contrast:  100 mL of iohexol (OMNIPAQUE) Enteric Contrast: Enteric contrast was administered   FINDINGS: CHEST LUNGS:  Previously seen peripheral groundglass opacities are much improved  A few residual mild opacities are present and could represent postinfectious scarring  No acute pulmonary findings  No endotracheal or endobronchial lesion  PLEURA:  Unremarkable  HEART/GREAT VESSELS: Heart is unremarkable for patient's age  No thoracic aortic aneurysm  MEDIASTINUM AND MAGALYS:  Unremarkable  CHEST WALL AND LOWER NECK:   Left posterior para midline chest wall surgical scar decreased in thickness when compared with the prior CT  ABDOMEN LIVER/BILIARY TREE:  Unchanged well-circumscribed 8 mm segment 5 hypodensity, likely a cyst  #2/55 GALLBLADDER:  No calcified gallstones  No pericholecystic inflammatory change  SPLEEN:  Unremarkable  PANCREAS:  Unremarkable  ADRENAL GLANDS:  Unremarkable  KIDNEYS/URETERS:  Unchanged subcentimeter left renal cyst  No hydronephrosis  STOMACH AND BOWEL:  There is colonic diverticulosis without evidence of acute diverticulitis  APPENDIX:  No findings to suggest appendicitis  ABDOMINOPELVIC CAVITY:  No ascites  No pneumoperitoneum  No lymphadenopathy  Previously seen fat stranding/soft tissue infiltration adjacent to the pancreas has resolved  VESSELS:  Unremarkable for patient's age  PELVIS REPRODUCTIVE ORGANS:  Unremarkable for patient's age  URINARY BLADDER:  Unremarkable  ABDOMINAL WALL/INGUINAL REGIONS:  Unremarkable  OSSEOUS STRUCTURES:  No acute fracture or destructive osseous lesion  Impression: Previously seen fat stranding/soft tissue infiltration adjacent to the pancreas has resolved  Pulmonary groundglass opacity seen on the prior study are much improved  A few residual mild opacities are present and could represent postinfectious scarring  No signs of metastatic disease in the chest, abdomen or pelvis in this patient with a history of lymphoma Workstation performed: [id number]             Assessment/Plan  Mr Ellen Godwin is a 48 yr male with Stage IIIB ([id number]) melanoma here as transfer of care and for follow up      1  Malignant melanoma of upper back ([address] )  2  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 [facility])  I had an extensive discussion with the patient regarding his  diagnosis, prognosis, and recommendations for further management  We reviewed his melanoma history, current findings, pathology and imaging tests  We discussed that he is a Stage IIIB melanoma and will finish up one year of adjuvant treatment within the next three months  He is tolerating treatment well  Labs reviewed and ok  We discussed that his recent scans are negative for melanoma recurrence or metastasis  He discussed that he should have a staging brain MRI and that he will need one  Orders placed and script provided  In addition, cardiac toxicity is know side effect of BRAF/MEK inhibitor and he will need EKG and ECHO for evaluation  Orders placed and provided him with scripts  We discussed that he require ongoing monitoring and surveillance, both for disease recurrence as well as a new primary melanoma as well as other nonmelanoma skin cancers  This includes physical exams and labs, including a comprehensive metabolic panel, CBC with differential and LDH; periodic imaging studies with either CT chest, abdomen and pelvis or PET/CT scans every 6 months  He will also need periodic restagin brain MRIs as well  He will need monthly follow up while on targeted therapy  Once he completes treatment, he should be seen    every 3 months for 3 years after diagnosis and then every 6 months until year five, then annually with physical exams and labs and scans  We discussed the importance of regular cutaneous self examinations and reviewed the features of lesions that could be concerning for a primary melanoma  I did explain that he  is at risk for developing another primary melanoma as well  We also discussed avoidance of unnecessary sun exposure and use of sun protective clothing and sunscreen  I also recommended routine follow up and skin checks with dermatology      Family Screening: his  first-degree relatives, namely his siblings, parents, and children, have an increased risk of developing a melanoma over the general population given his  diagnosis of melanoma, and should have annual dermatologic screening  - ECG 12 lead; Future  - Echo complete w/ contrast if indicated; Future  - CBC and differential; Standing  - Comprehensive metabolic panel; Standing  - LD,Blood; Standing  - Ambulatory referral to [facility]; Future  - MRI brain w wo contrast; Future  - CBC and differential  - Comprehensive metabolic panel  - LD,Blood  - Ambulatory Referral to Dermatology; Future        3  Hypertension, unspecified type  Consistently elevated BP over the last three visits  He does need to establish care with a family doctor  In the meantime, I did start him on antihypertensive medication  He will return in 2 weeks for blood pressure and side effect monitoring        - Ambulatory referral to [facility]; Future  - lisinopril (ZESTRIL) 10 mg tablet; Take 1 tablet (10 mg total) by mouth daily  Dispense: 30 tablet; Refill: 3  - amLODIPine (NORVASC) 5 mg tablet; Take 1 tablet (5 mg total) by mouth daily  Dispense: 30 tablet; Refill: 3    4  High risk medications (not anticoagulants) long-term use  He is on treatment with targeted therapy and we will continue to monitor for side effects and lab abnormalities  We will monitor labs monthly to ensure safety of continuing on treatment  He knows to watch for signs and symptoms for side effects  - ECG 12 lead; Future  - Echo complete w/ contrast if indicated; Future  - CBC and differential; Standing  - Comprehensive metabolic panel; Standing  - LD,Blood; Standing  - Ambulatory referral to [facility]; Future  - MRI brain w wo contrast; Future  - CBC and differential  - Comprehensive metabolic panel  - LD,Blood        Return in about 1 month (around 2/28/2022) for Office Visit, Labs, Scan       Mr Connie Gonzalez had all his questions and concerns addressed and he knows to call with any additional issues  Thank you for allowing me to participate in   Elsa Wellington stefanie Hale MD, PhD

## 2022-02-04 NOTE — PROGRESS NOTES
[facility]EMATOLOGY ONCOLOGY SPECIALISTS [facility]  [address] [address]  [phone]     Date of Visit: 1/28/2022  Name: Leyn Amezcua   YOB: 1975       Subjective    VISIT DIAGNOSIS:  Diagnoses and all orders for this visit:    Malignant melanoma of upper back ([facility] )  -     ECG 12 lead; Future  -     Echo complete w/ contrast if indicated; Future  -     CBC and differential; Standing  -     Comprehensive metabolic panel; Standing  -     LD,Blood; Standing  -     Ambulatory referral to [facility]; Future  -     MRI brain w wo contrast; Future  -     CBC and differential  -     Comprehensive metabolic panel  -     LD,Blood  -     Ambulatory Referral to Dermatology; Future    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 ([facility] )    Hypertension, unspecified type  -     Ambulatory referral to [facility]; Future  -     lisinopril (ZESTRIL) 10 mg tablet; Take 1 tablet (10 mg total) by mouth daily  -     amLODIPine (NORVASC) 5 mg tablet; Take 1 tablet (5 mg total) by mouth daily    High risk medications (not anticoagulants) long-term use  -     ECG 12 lead; Future  -     Echo complete w/ contrast if indicated; Future  -     CBC and differential; Standing  -     Comprehensive metabolic panel; Standing  -     LD,Blood; Standing  -     Ambulatory referral to [facility];  Future  -     MRI brain w wo contrast; Future  -     CBC and differential  -     Comprehensive metabolic panel  -     LD,Blood        Oncology History   Malignant melanoma of upper back ([facility] )   1/4/2020 Biopsy    Left upper back shave biopsy:  - Malignant melanoma 2 3mm    Mitosis: 6  Ulceration: not identified     2/2/2021 Surgery    Wide excision with sentinel lymph node biopsy     2/2/2021 -  Cancer Staged    Staging form: Melanoma of the Skin, AJCC 8th Edition  - Pathologic stage from 2/2/2021: Stage IIIB (pT3a, pN2a, cM0) - Signed by Sherley Hitchcock MD on 2/4/2022  Stage prefix: Initial diagnosis 4/8/2021 Genomic Testing    Decision DX: Class 1B     4/2021 -  Chemotherapy     Tafinalar 150 mg b i d  Mekinist 2 mg p o  Daily        Cancer Staging  Malignant melanoma of upper back [facility])  Staging form: Melanoma of the Skin, AJCC 8th Edition  - Pathologic stage from 2/2/2021: Stage IIIB (pT3a, pN2a, cM0)       HISTORY OF PRESENT ILLNESS: Oumou Greene is a 48 y o  male  who is seen in consultation for a CONNOR from Dr Michael Delgadillo for a diagnosis of melanoma   The history is obtained from the patient, review of records, and discussion with Dr Michael Delgadillo       He states that the lesion on his upper back had been there for a few years  He described it as being the size of a pencil eraser, slightly raised, and flesh colored  November/December 2020 he states that the lesion opened and wouldn't heal    Denied itching, bleeding, or pain of the lesion  Nothing made it worse or better  He did not have routine dermatology exams prior to now  He saw dermatology and underwent a biopsy on 1/21/21 of the back lesion which demonstrated melanoma 2 2 mm thick, negative for ulceration, 6 mitoses/mm2  He then had a WLE and SLNBx with Dr Vee Mccoy  Pathology demonstrated residual melanoma 1 3 mm thick, negative for ulceration, 6 mitoses/mm2, and 2 lymph nodes positive for melanoma with the largest deposit 1 3mm  Final stage of Stage IIIB ([id number]) melanoma  His tumor was tested for BRAF mutation and it was positive for V600E  He was started on adjuvant treatment with dabrafenib and trametinib and is doing well and tolerating treatment  Today, he has no complaints and feels good  Denies any side effects - no rash, itching, arthralgias, myalgias, fevers or chills  He denies any other new, changing, or concerning lesions at this time  I independently reviewed his imaging and his recent CT scans from 1/24/2022 were negative for melanoma recurrence or metastasis  He denies a previous history of BCC/SCC    He describes having average sun exposure when younger  He describes having an increased number of sunburns  He describes having 1-2 blistering sunburns  He denies tanning bed use  He had blonde hair when he was younger, has blue eyes, and his ancestry is [facility]  He has a family history of [address] in his father, breast cancer in his paternal grandmother [de-identified][de-identified] y o)  Denies family history of melanoma, ovarian, or pancreatic cancer  15 and 8year-old daughters  He has not had a colonoscopy or PSA check  Denies smoking, occasional alcohol, and no drug use  REVIEW OF SYSTEMS:  Review of Systems   Constitutional: Negative for appetite change, fatigue, fever and unexpected weight change  HENT:   Negative for lump/mass  Eyes: Negative for icterus  Respiratory: Negative for cough, shortness of breath and wheezing  Cardiovascular: Negative for leg swelling  Gastrointestinal: Negative for abdominal pain, constipation, diarrhea, nausea and vomiting  Genitourinary: Negative for difficulty urinating and hematuria  Musculoskeletal: Negative for arthralgias, gait problem and myalgias  Skin: Negative for itching and rash  No new, changing, or concerning lesions  Neurological: Negative for extremity weakness, gait problem, headaches, light-headedness and numbness  Hematological: Negative for adenopathy          MEDICATIONS:    Current Outpatient Medications:     amLODIPine (NORVASC) 5 mg tablet, Take 1 tablet (5 mg total) by mouth daily, Disp: 30 tablet, Rfl: 3    dabrafenib (TAFINLAR) 75 MG capsule, Take 2 capsules (150 mg total) by mouth 2 (two) times a day Take on an empty stomach, Disp: 120 capsule, Rfl: 2    lisinopril (ZESTRIL) 10 mg tablet, Take 1 tablet (10 mg total) by mouth daily, Disp: 30 tablet, Rfl: 3    Trametinib Dimethyl Sulfoxide 2 MG TABS, Take 1 tablet (2 mg total) by mouth daily, Disp: 30 tablet, Rfl: 2     ALLERGIES:  No Known Allergies     ACTIVE PROBLEMS:  Patient Active Problem List   Diagnosis    Malignant [facility] )    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 ([facility] )          PAST MEDICAL HISTORY:   History reviewed  No pertinent past medical history  PAST SURGICAL HISTORY:  Past Surgical History:   Procedure Laterality Date    LYMPH NODE BIOPSY N/A 2/2/2021    Procedure: BIOPSY LYMPH NODE LEFT AXILLARY SENTINEL;  Surgeon: Parvez Pereira MD;  [facility]: [facility];  Service: Surgical Oncology    NO PAST SURGERIES      SKIN LESION EXCISION Left 2/2/2021    Procedure: WIDE EXCISION MELANOMA SCAR UPPER BACK, intraoperative lymphatic mapping, sentinel lymph node biopsy; [address];  Surgeon: Parvez Pereira MD;  Location: [facility];  Service: Surgical Oncology        SOCIAL HISTORY:  Social History     Socioeconomic History    Marital status: /Civil Union     Spouse name: None    Number of children: None    Years of education: None    Highest education level: None   Occupational History    None   Tobacco Use    Smoking status: Never Smoker    Smokeless tobacco: Never Used   Vaping Use    Vaping Use: Never used   Substance and Sexual Activity    Alcohol use: Yes    Drug use: Never    Sexual activity: None   Other Topics Concern    None   Social History Narrative    None     Social Determinants of Health     Financial Resource Strain: Not on file   Food Insecurity: Not on file   Transportation Needs: Not on file   Physical Activity: Not on file   Stress: Not on file   Social Connections: Not on file   Intimate Partner Violence: Not on file   Housing Stability: Not on file        FAMILY HISTORY:  Family History   Problem Relation Age of Onset    Basal cell carcinoma Father     No Known Problems Mother     Breast cancer Paternal Aunt 43    Breast cancer Paternal Grandmother [de-identified]          Objective    PHYSICAL EXAMINATION:   Blood pressure 170/98, pulse (!) 114, temperature 98 9 °F (37 2 °C), resp   rate 17, height 5' 8" (1 727 m), weight 98 9 kg (218 lb), SpO2 97 %  Pain Score: 0-No pain     ECOG Performance Status      Most Recent Value   ECOG Performance Status 0 - Fully active, able to carry on all pre-disease performance without restriction             Physical Exam  Constitutional:       General: He is not in acute distress  Appearance: Normal appearance  He is not toxic-appearing  HENT:      Mouth/Throat:      Mouth: Mucous membranes are moist       Pharynx: Oropharynx is clear  Eyes:      General: No scleral icterus  Cardiovascular:      Rate and Rhythm: Normal rate and regular rhythm  Pulses: Normal pulses  Heart sounds: No murmur heard  No friction rub  No gallop  Pulmonary:      Effort: Pulmonary effort is normal  No respiratory distress  Breath sounds: Normal breath sounds  No wheezing or rales  Chest:   Breasts:      Right: No axillary adenopathy or supraclavicular adenopathy  Left: No axillary adenopathy or supraclavicular adenopathy  Abdominal:      General: There is no distension  Palpations: There is no mass  Tenderness: There is no abdominal tenderness  There is no rebound  Musculoskeletal:         General: No swelling or tenderness  Right lower leg: No edema  Left lower leg: No edema  Lymphadenopathy:      Head:      Right side of head: No submandibular, preauricular or posterior auricular adenopathy  Left side of head: No submandibular, preauricular or posterior auricular adenopathy  Cervical: No cervical adenopathy  Right cervical: No superficial or posterior cervical adenopathy  Left cervical: No superficial or posterior cervical adenopathy  Upper Body:      Right upper body: No supraclavicular or axillary adenopathy  Left upper body: No supraclavicular or axillary adenopathy  Lower Body: No right inguinal adenopathy  No left inguinal adenopathy  Skin:     Findings: No rash  Comments: Well healed surgical scar    No evidence of recurrence at primary site  Neurological:      General: No focal deficit present  Mental Status: He is alert and oriented to person, place, and time  Psychiatric:         Mood and Affect: Mood normal          Behavior: Behavior normal          Thought Content: Thought content normal          Judgment: Judgment normal          I reviewed lab data in the chart      WBC   Date Value Ref Range Status   01/20/2022 4 86 4 31 - 10 16 Thousand/uL Final   10/19/2021 5 70 4 80 - 10 80 Thousand/uL Final   06/09/2021 4 70 (L) 4 80 - 10 80 Thousand/uL Final     Hemoglobin   Date Value Ref Range Status   01/20/2022 14 2 12 0 - 17 0 g/dL Final   10/19/2021 14 2 14 0 - 18 0 g/dL Final   06/09/2021 14 5 14 0 - 18 0 g/dL Final     Platelets   Date Value Ref Range Status   01/20/2022 168 149 - 390 Thousands/uL Final   10/19/2021 220 149 - 390 Thousands/uL Final   06/09/2021 157 149 - 390 Thousands/uL Final     MCV   Date Value Ref Range Status   01/20/2022 95 82 - 98 fL Final   10/19/2021 94 81 - 99 fL Final   06/09/2021 92 81 - 99 fL Final      Potassium   Date Value Ref Range Status   01/20/2022 3 5 3 5 - 5 3 mmol/L Final   10/19/2021 3 5 3 5 - 5 5 mmol/L Final   06/09/2021 3 8 3 5 - 5 5 mmol/L Final     Chloride   Date Value Ref Range Status   01/20/2022 106 96 - 108 mmol/L Final   10/19/2021 104 98 - 107 mmol/L Final   06/09/2021 103 98 - 107 mmol/L Final     CO2   Date Value Ref Range Status   01/20/2022 26 21 - 32 mmol/L Final   10/19/2021 25 21 - 31 mmol/L Final   06/09/2021 25 21 - 31 mmol/L Final     BUN   Date Value Ref Range Status   01/20/2022 18 5 - 25 mg/dL Final   10/19/2021 20 7 - 25 mg/dL Final   06/09/2021 21 7 - 25 mg/dL Final     Creatinine   Date Value Ref Range Status   01/20/2022 1 37 (H) 0 60 - 1 30 mg/dL Final     Comment:     Standardized to IDMS reference method   10/19/2021 1 07 0 70 - 1 30 mg/dL Final     Comment:     Standardized to IDMS reference method   06/09/2021 1 20 0 70 - 1 30 mg/dL Final     Comment:     Standardized to IDMS reference method     Calcium   Date Value Ref Range Status   01/20/2022 9 0 8 4 - 10 2 mg/dL Final   10/19/2021 9 3 8 6 - 10 5 mg/dL Final   06/09/2021 9 8 8 6 - 10 5 mg/dL Final     Albumin   Date Value Ref Range Status   01/20/2022 4 3 3 5 - 5 0 g/dL Final   10/19/2021 3 9 3 5 - 5 7 g/dL Final   06/09/2021 4 0 3 5 - 5 7 g/dL Final     Total Bilirubin   Date Value Ref Range Status   01/20/2022 0 34 0 20 - 1 00 mg/dL Final     Comment:     Use of this assay is not recommended for patients undergoing treatment with eltrombopag due to the potential for falsely elevated results  10/19/2021 0 30 0 20 - 1 00 mg/dL Final   06/09/2021 0 30 0 20 - 1 00 mg/dL Final     Alkaline Phosphatase   Date Value Ref Range Status   01/20/2022 59 34 - 104 U/L Final   10/19/2021 89 40 - 150 U/L Final   06/09/2021 67 40 - 150 U/L Final     AST   Date Value Ref Range Status   01/20/2022 20 13 - 39 U/L Final     Comment:     Specimen collection should occur prior to Sulfasalazine administration due to the potential for falsely depressed results  10/19/2021 21 13 - 39 U/L Final     Comment:     Specimen collection should occur prior to Sulfasalazine administration due to the potential for falsely depressed results  06/09/2021 27 13 - 39 U/L Final     Comment:     Specimen collection should occur prior to Sulfasalazine administration due to the potential for falsely depressed results  ALT   Date Value Ref Range Status   01/20/2022 20 7 - 52 U/L Final     Comment:     Specimen collection should occur prior to Sulfasalazine administration due to the potential for falsely depressed results  10/19/2021 24 7 - 52 U/L Final     Comment:     Specimen collection should occur prior to Sulfasalazine administration due to the potential for falsely depressed results      06/09/2021 35 7 - 52 U/L Final     Comment:     Specimen collection should occur prior to Sulfasalazine administration due to the potential for falsely depressed results  LD   Date Value Ref Range Status   01/20/2022 160 140 - 271 U/L Final   10/19/2021 193 84 - 246 U/L Final     LDH   Date Value Ref Range Status   02/26/2021 200 121 - 224 IU/L Final     No results found for: TSH  No results found for: [id number]   No results found for: FREET4      RECENT IMAGING:  Procedure: CT chest abdomen pelvis w contrast    Result Date: 1/25/2022  Narrative: CT CHEST, ABDOMEN AND PELVIS WITH IV CONTRAST INDICATION:   C43 9: Malignant melanoma of skin, unspecified C77 9: Secondary and unspecified malignant neoplasm of lymph node, unspecified  COMPARISON:  CT chest abdomen pelvis 10/19/2021  PET CT 11/5/2021  TECHNIQUE: CT examination of the chest, abdomen and pelvis was performed  Axial, sagittal, and coronal 2D reformatted images were created from the source data and submitted for interpretation  Radiation dose length product (DLP) for this visit:  994 04 mGy-cm   This examination, like all CT scans performed in the [facility], was performed utilizing techniques to minimize radiation dose exposure, including the use of iterative  reconstruction and automated exposure control  IV Contrast:  100 mL of iohexol (OMNIPAQUE) Enteric Contrast: Enteric contrast was administered  FINDINGS: CHEST LUNGS:  Previously seen peripheral groundglass opacities are much improved  A few residual mild opacities are present and could represent postinfectious scarring  No acute pulmonary findings  No endotracheal or endobronchial lesion  PLEURA:  Unremarkable  HEART/GREAT VESSELS: Heart is unremarkable for patient's age  No thoracic aortic aneurysm  MEDIASTINUM AND MAGALYS:  Unremarkable  CHEST WALL AND LOWER NECK:   Left posterior para midline chest wall surgical scar decreased in thickness when compared with the prior CT   ABDOMEN LIVER/BILIARY TREE:  Unchanged well-circumscribed 8 mm segment 5 hypodensity, likely a cyst  #2/55 GALLBLADDER:  No calcified gallstones  No pericholecystic inflammatory change  SPLEEN:  Unremarkable  PANCREAS:  Unremarkable  ADRENAL GLANDS:  Unremarkable  KIDNEYS/URETERS:  Unchanged subcentimeter left renal cyst  No hydronephrosis  STOMACH AND BOWEL:  There is colonic diverticulosis without evidence of acute diverticulitis  APPENDIX:  No findings to suggest appendicitis  ABDOMINOPELVIC CAVITY:  No ascites  No pneumoperitoneum  No lymphadenopathy  Previously seen fat stranding/soft tissue infiltration adjacent to the pancreas has resolved  VESSELS:  Unremarkable for patient's age  PELVIS REPRODUCTIVE ORGANS:  Unremarkable for patient's age  URINARY BLADDER:  Unremarkable  ABDOMINAL WALL/INGUINAL REGIONS:  Unremarkable  OSSEOUS STRUCTURES:  No acute fracture or destructive osseous lesion  Impression: Previously seen fat stranding/soft tissue infiltration adjacent to the pancreas has resolved  Pulmonary groundglass opacity seen on the prior study are much improved  A few residual mild opacities are present and could represent postinfectious scarring  No signs of metastatic disease in the chest, abdomen or pelvis in this patient with a history of lymphoma Workstation performed: [id number]             Assessment/Plan  Mr Abby Quevedo is a 48 yr male with Stage IIIB ([id number]) melanoma here as transfer of care and for follow up  1  Malignant melanoma of upper back ([facility] )  2  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 [facility])  I had an extensive discussion with the patient regarding his  diagnosis, prognosis, and recommendations for further management  We reviewed his melanoma history, current findings, pathology and imaging tests  We discussed that he is a Stage IIIB melanoma and will finish up one year of adjuvant treatment within the next three months  He is tolerating treatment well  Labs reviewed and ok  We discussed that his recent scans are negative for melanoma recurrence or metastasis    He discussed that he should have a staging brain MRI and that he will need one  Orders placed and script provided  In addition, cardiac toxicity is know side effect of BRAF/MEK inhibitor and he will need EKG and ECHO for evaluation  Orders placed and provided him with scripts  We discussed that he require ongoing monitoring and surveillance, both for disease recurrence as well as a new primary melanoma as well as other nonmelanoma skin cancers  This includes physical exams and labs, including a comprehensive metabolic panel, CBC with differential and LDH; periodic imaging studies with either CT chest, abdomen and pelvis or PET/CT scans every 6 months  He will also need periodic restagin brain MRIs as well  He will need monthly follow up while on targeted therapy  Once he completes treatment, he should be seen    every 3 months for 3 years after diagnosis and then every 6 months until year five, then annually with physical exams and labs and scans  We discussed the importance of regular cutaneous self examinations and reviewed the features of lesions that could be concerning for a primary melanoma  I did explain that he  is at risk for developing another primary melanoma as well  We also discussed avoidance of unnecessary sun exposure and use of sun protective clothing and sunscreen  I also recommended routine follow up and skin checks with dermatology  Family Screening: his  first-degree relatives, namely his siblings, parents, and children, have an increased risk of developing a melanoma over the general population given his  diagnosis of melanoma, and should have annual dermatologic screening  - ECG 12 lead; Future  - Echo complete w/ contrast if indicated; Future  - CBC and differential; Standing  - Comprehensive metabolic panel; Standing  - LD,Blood; Standing  - Ambulatory referral to [facility];  Future  - MRI brain w wo contrast; Future  - CBC and differential  - Comprehensive metabolic panel  - LD,Blood  - Ambulatory Referral to Dermatology; Future        3  Hypertension, unspecified type  Consistently elevated BP over the last three visits  He does need to establish care with a family doctor  In the meantime, I did start him on antihypertensive medication  He will return in 2 weeks for blood pressure and side effect monitoring        - [facility]; [facility]  - lisinopril (ZESTRIL) 10 mg tablet; Take 1 tablet (10 mg total) by mouth daily  Dispense: 30 tablet; Refill: 3  - amLODIPine (NORVASC) 5 mg tablet; Take 1 tablet (5 mg total) by mouth daily  Dispense: 30 tablet; Refill: 3    4  High risk medications (not anticoagulants) long-term use  He is on treatment with targeted therapy and we will continue to monitor for side effects and lab abnormalities  We will monitor labs monthly to ensure safety of continuing on treatment  He knows to watch for signs and symptoms for side effects  - ECG 12 lead; Future  - Echo complete w/ contrast if indicated; Future  - CBC and differential; Standing  - Comprehensive metabolic panel; Standing  - LD,Blood; Standing  - [facility]; Future  - MRI brain w wo contrast; Future  - CBC and differential  - Comprehensive metabolic panel  - LD,Blood        Return in about 1 month (around 2/28/2022) for Office Visit, Labs, Scan  Mr Naga Zazueta had all his questions and concerns addressed and he knows to call with any additional issues  Thank you for allowing me to participate in Mr Shahab Nj stefanie Rosas MD, PhD

## 2022-02-07 ENCOUNTER — APPOINTMENT (OUTPATIENT)
Dept: LAB | Facility: HOSPITAL | Age: 47
End: 2022-02-07
Payer: COMMERCIAL

## 2022-02-07 ENCOUNTER — OFFICE VISIT (OUTPATIENT)
Dept: LAB | Facility: HOSPITAL | Age: 47
End: 2022-02-07
Payer: COMMERCIAL

## 2022-02-07 DIAGNOSIS — Z79.899 HIGH RISK MEDICATIONS (NOT ANTICOAGULANTS) LONG-TERM USE: ICD-10-CM

## 2022-02-07 DIAGNOSIS — C43.59 MALIGNANT MELANOMA OF UPPER BACK (HCC): ICD-10-CM

## 2022-02-07 LAB
ALBUMIN SERPL BCP-MCNC: 4.3 G/DL (ref 3.5–5)
ALP SERPL-CCNC: 73 U/L (ref 34–104)
ALT SERPL W P-5'-P-CCNC: 22 U/L (ref 7–52)
ANION GAP SERPL CALCULATED.3IONS-SCNC: 8 MMOL/L (ref 4–13)
AST SERPL W P-5'-P-CCNC: 22 U/L (ref 13–39)
ATRIAL RATE: 92 BPM
BASOPHILS # BLD AUTO: 0.02 THOUSANDS/ΜL (ref 0–0.1)
BASOPHILS NFR BLD AUTO: 0 % (ref 0–1)
BILIRUB SERPL-MCNC: 0.41 MG/DL (ref 0.2–1)
BUN SERPL-MCNC: 18 MG/DL (ref 5–25)
CALCIUM SERPL-MCNC: 9.6 MG/DL (ref 8.4–10.2)
CHLORIDE SERPL-SCNC: 102 MMOL/L (ref 96–108)
CO2 SERPL-SCNC: 25 MMOL/L (ref 21–32)
CREAT SERPL-MCNC: 1.24 MG/DL (ref 0.6–1.3)
EOSINOPHIL # BLD AUTO: 0.06 THOUSAND/ΜL (ref 0–0.61)
EOSINOPHIL NFR BLD AUTO: 1 % (ref 0–6)
ERYTHROCYTE [DISTWIDTH] IN BLOOD BY AUTOMATED COUNT: 13.5 % (ref 11.6–15.1)
GFR SERPL CREATININE-BSD FRML MDRD: 68 ML/MIN/1.73SQ M
GLUCOSE P FAST SERPL-MCNC: 125 MG/DL (ref 65–99)
HCT VFR BLD AUTO: 43.5 % (ref 36.5–49.3)
HGB BLD-MCNC: 14.2 G/DL (ref 12–17)
IMM GRANULOCYTES # BLD AUTO: 0.01 THOUSAND/UL (ref 0–0.2)
IMM GRANULOCYTES NFR BLD AUTO: 0 % (ref 0–2)
LDH SERPL-CCNC: 195 U/L (ref 140–271)
LYMPHOCYTES # BLD AUTO: 1.84 THOUSANDS/ΜL (ref 0.6–4.47)
LYMPHOCYTES NFR BLD AUTO: 40 % (ref 14–44)
MCH RBC QN AUTO: 30.7 PG (ref 26.8–34.3)
MCHC RBC AUTO-ENTMCNC: 32.6 G/DL (ref 31.4–37.4)
MCV RBC AUTO: 94 FL (ref 82–98)
MONOCYTES # BLD AUTO: 0.35 THOUSAND/ΜL (ref 0.17–1.22)
MONOCYTES NFR BLD AUTO: 8 % (ref 4–12)
NEUTROPHILS # BLD AUTO: 2.33 THOUSANDS/ΜL (ref 1.85–7.62)
NEUTS SEG NFR BLD AUTO: 51 % (ref 43–75)
NRBC BLD AUTO-RTO: 0 /100 WBCS
P AXIS: 12 DEGREES
PLATELET # BLD AUTO: 202 THOUSANDS/UL (ref 149–390)
PMV BLD AUTO: 10.2 FL (ref 8.9–12.7)
POTASSIUM SERPL-SCNC: 3.8 MMOL/L (ref 3.5–5.3)
PR INTERVAL: 162 MS
PROT SERPL-MCNC: 7.6 G/DL (ref 6.4–8.4)
QRS AXIS: 18 DEGREES
QRSD INTERVAL: 88 MS
QT INTERVAL: 352 MS
QTC INTERVAL: 435 MS
RBC # BLD AUTO: 4.63 MILLION/UL (ref 3.88–5.62)
SODIUM SERPL-SCNC: 135 MMOL/L (ref 135–147)
T WAVE AXIS: 17 DEGREES
VENTRICULAR RATE: 92 BPM
WBC # BLD AUTO: 4.61 THOUSAND/UL (ref 4.31–10.16)

## 2022-02-07 PROCEDURE — 93005 ELECTROCARDIOGRAM TRACING: CPT

## 2022-02-07 PROCEDURE — 36415 COLL VENOUS BLD VENIPUNCTURE: CPT | Performed by: INTERNAL MEDICINE

## 2022-02-07 PROCEDURE — 80053 COMPREHEN METABOLIC PANEL: CPT | Performed by: INTERNAL MEDICINE

## 2022-02-07 PROCEDURE — 93010 ELECTROCARDIOGRAM REPORT: CPT | Performed by: INTERNAL MEDICINE

## 2022-02-07 PROCEDURE — 85025 COMPLETE CBC W/AUTO DIFF WBC: CPT | Performed by: INTERNAL MEDICINE

## 2022-02-07 PROCEDURE — 83615 LACTATE (LD) (LDH) ENZYME: CPT | Performed by: INTERNAL MEDICINE

## 2022-02-09 ENCOUNTER — TELEPHONE (OUTPATIENT)
Dept: DERMATOLOGY | Facility: CLINIC | Age: 47
End: 2022-02-09

## 2022-02-09 NOTE — TELEPHONE ENCOUNTER
Pt called to schedule skin check as he has a referral but n/a so I advised to cb when he can schedule   wild

## 2022-02-11 ENCOUNTER — HOSPITAL ENCOUNTER (OUTPATIENT)
Dept: MRI IMAGING | Facility: HOSPITAL | Age: 47
Discharge: HOME | End: 2022-02-11
Payer: COMMERCIAL

## 2022-02-11 ENCOUNTER — OFFICE VISIT (OUTPATIENT)
Dept: HEMATOLOGY ONCOLOGY | Facility: CLINIC | Age: 47
End: 2022-02-11
Payer: COMMERCIAL

## 2022-02-11 VITALS
SYSTOLIC BLOOD PRESSURE: 138 MMHG | RESPIRATION RATE: 18 BRPM | HEIGHT: 68 IN | OXYGEN SATURATION: 97 % | HEART RATE: 88 BPM | TEMPERATURE: 98.2 F | BODY MASS INDEX: 32.66 KG/M2 | DIASTOLIC BLOOD PRESSURE: 92 MMHG | WEIGHT: 215.5 LBS

## 2022-02-11 DIAGNOSIS — C43.9 MELANOMA METASTATIC TO LYMPH NODE (HCC): ICD-10-CM

## 2022-02-11 DIAGNOSIS — C43.59 MALIGNANT MELANOMA OF UPPER BACK (HCC): ICD-10-CM

## 2022-02-11 DIAGNOSIS — I10 HYPERTENSION, UNSPECIFIED TYPE: Primary | ICD-10-CM

## 2022-02-11 DIAGNOSIS — C77.9 MELANOMA METASTATIC TO LYMPH NODE (HCC): ICD-10-CM

## 2022-02-11 DIAGNOSIS — Z79.899 HIGH RISK MEDICATIONS (NOT ANTICOAGULANTS) LONG-TERM USE: ICD-10-CM

## 2022-02-11 PROCEDURE — A9585 GADOBUTROL INJECTION: HCPCS

## 2022-02-11 PROCEDURE — G1004 CDSM NDSC: HCPCS

## 2022-02-11 PROCEDURE — 99213 OFFICE O/P EST LOW 20 MIN: CPT | Performed by: INTERNAL MEDICINE

## 2022-02-11 PROCEDURE — 70553 MRI BRAIN STEM W/O & W/DYE: CPT

## 2022-02-11 RX ADMIN — GADOBUTROL 10 ML: 604.72 INJECTION INTRAVENOUS at 07:35

## 2022-02-11 NOTE — PROGRESS NOTES
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 [facility])        Oncology History   Malignant melanoma of upper back ([address] )   1/4/2020 Biopsy    Left upper back shave biopsy:  - Malignant melanoma 2 3mm    Mitosis: 6  Ulceration: not identified     2/2/2021 Surgery    Wide excision with sentinel lymph node biopsy     2/2/2021 -  Cancer Staged    Staging form: Melanoma of the Skin, AJCC 8th Edition  - Pathologic stage from 2/2/2021: Stage IIIB (pT3a, pN2a, cM0) - Signed by Maureen Plunkett MD on 2/4/2022  Stage prefix: Initial diagnosis       4/8/2021 Genomic Testing    Decision DX: Class 1B     4/2021 -  Chemotherapy     Tafinalar 150 mg b i d  Mekinist 2 mg p o  Daily        Cancer Staging  Malignant melanoma of upper back [facility])  Staging form: Melanoma of the Skin, AJCC 8th Edition  - Pathologic stage from 2/2/2021: Stage IIIB (pT3a, pN2a, cM0) - Signed by Maureen Plunkett MD on 2/4/2022         HISTORY OF PRESENT ILLNESS: Janiya Tovar is a 48 y o  male  who has stage IIIB melanoma on dabrafenib and trametinib here for follow-up for elevated blood pressure at his last visit  Due to his elevated blood pressure we started amlodipine and lisinopril and he is doing much better  Blood pressure better today  He states that he feels better as well  Did realize he felt as horrible as he did until his blood pressure was lowered and he felt better  Drinking more water also  States that he is sleeping better and just overall has more energy and tolerance for daily activity  Denies shortness of breath or chest pain  Denies any lower extremity swelling  Denies any cough          REVIEW OF SYSTEMS:  Review of Systems   Constitutional: Negative for appetite change, fatigue, fever and unexpected weight change  HENT:   Negative for lump/mass  Eyes: Negative for icterus  Respiratory: Negative for cough, shortness of breath and wheezing  Cardiovascular: Negative for leg swelling  Gastrointestinal: Negative for abdominal pain, constipation, diarrhea, nausea and vomiting  Genitourinary: Negative for difficulty urinating and hematuria  Musculoskeletal: Negative for arthralgias, gait problem and myalgias  Skin: Negative for itching and rash  No new, changing, or concerning lesions  Neurological: Negative for extremity weakness, gait problem, headaches, light-headedness and numbness  Hematological: Negative for adenopathy  MEDICATIONS:    Current Outpatient Medications:     amLODIPine (NORVASC) 5 mg tablet, Take 1 tablet (5 mg total) by mouth daily, Disp: 30 tablet, Rfl: 3    dabrafenib (TAFINLAR) 75 MG capsule, Take 2 capsules (150 mg total) by mouth 2 (two) times a day Take on an empty stomach, Disp: 120 capsule, Rfl: 2    lisinopril (ZESTRIL) 10 mg tablet, Take 1 tablet (10 mg total) by mouth daily, Disp: 30 tablet, Rfl: 3    Trametinib Dimethyl Sulfoxide 2 MG TABS, Take 1 tablet (2 mg total) by mouth daily, Disp: 30 tablet, Rfl: 2  No current facility-administered medications for this visit  ALLERGIES:  No Known Allergies     ACTIVE PROBLEMS:  Patient Active Problem List   Diagnosis    Malignant melanoma of [address] [address] )    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 [address] [address] )          PAST MEDICAL HISTORY:   History reviewed  No pertinent past medical history       PAST SURGICAL HISTORY:  Past Surgical History:   Procedure Laterality Date    LYMPH NODE BIOPSY N/A 2/2/2021    Procedure: BIOPSY LYMPH NODE LEFT AXILLARY SENTINEL;  Surgeon: Cr Edwards MD;  Location: [facility];  Service: Surgical Oncology    NO PAST SURGERIES      SKIN LESION EXCISION Left 2/2/2021 Procedure: WIDE EXCISION MELANOMA SCAR UPPER BACK, intraoperative lymphatic mapping, sentinel lymph node biopsy; [address];  Surgeon: Lola Andrade MD;  Location: [facility];  Service: Surgical Oncology        SOCIAL HISTORY:  Social History     Socioeconomic History    Marital status: /Civil Union     Spouse name: None    Number of children: None    Years of education: None    Highest education level: None   Occupational History    None   Tobacco Use    Smoking status: Never Smoker    Smokeless tobacco: Never Used   Vaping Use    Vaping Use: Never used   Substance and Sexual Activity    Alcohol use: Yes    Drug use: Never    Sexual activity: None   Other Topics Concern    None   Social History Narrative    None     Social Determinants of Health     Financial Resource Strain: Not on file   Food Insecurity: Not on file   Transportation Needs: Not on file   Physical Activity: Not on file   Stress: Not on file   Social Connections: Not on file   Intimate Partner Violence: Not on file   Housing Stability: Not on file        FAMILY HISTORY:  Family History   Problem Relation Age of Onset    Basal cell carcinoma Father     No Known Problems Mother     Breast cancer Paternal Aunt 43    Breast cancer Paternal Grandmother [de-identified]          Objective    PHYSICAL EXAMINATION:   Blood pressure 138/92, pulse 88, temperature 98 2 °F (36 8 °C), resp  rate 18, height 5' 8" (1 727 m), weight 97 8 kg (215 lb 8 oz), SpO2 97 %  ECOG Performance Status      Most Recent Value   ECOG Performance Status 0 - Fully active, able to carry on all pre-disease performance without restriction             Physical Exam  Vitals reviewed  Constitutional:       General: He is not in acute distress  Appearance: Normal appearance  He is not toxic-appearing  HENT:      Mouth/Throat:      Mouth: Mucous membranes are moist       Pharynx: Oropharynx is clear  Eyes:      General: No scleral icterus    Cardiovascular: Rate and Rhythm: Normal rate and regular rhythm  Pulses: Normal pulses  Heart sounds: No murmur heard  No friction rub  No gallop  Pulmonary:      Effort: Pulmonary effort is normal  No respiratory distress  Breath sounds: Normal breath sounds  No wheezing or rales  Abdominal:      General: There is no distension  Palpations: There is no mass  Tenderness: There is no abdominal tenderness  There is no rebound  Musculoskeletal:         General: No swelling or tenderness  Right lower leg: No edema  Left lower leg: No edema  Skin:     Findings: No rash  Neurological:      General: No focal deficit present  Mental Status: He is alert and oriented to person, place, and time  Psychiatric:         Mood and Affect: Mood normal          Behavior: Behavior normal          Thought Content: Thought content normal          Judgment: Judgment normal          I reviewed lab data in the chart      WBC   Date Value Ref Range Status   02/07/2022 4 61 4 31 - 10 16 Thousand/uL Final   01/20/2022 4 86 4 31 - 10 16 Thousand/uL Final   10/19/2021 5 70 4 80 - 10 80 Thousand/uL Final     Hemoglobin   Date Value Ref Range Status   02/07/2022 14 2 12 0 - 17 0 g/dL Final   01/20/2022 14 2 12 0 - 17 0 g/dL Final   10/19/2021 14 2 14 0 - 18 0 g/dL Final     Platelets   Date Value Ref Range Status   02/07/2022 202 149 - 390 Thousands/uL Final   01/20/2022 168 149 - 390 Thousands/uL Final   10/19/2021 220 149 - 390 Thousands/uL Final     MCV   Date Value Ref Range Status   02/07/2022 94 82 - 98 fL Final   01/20/2022 95 82 - 98 fL Final   10/19/2021 94 81 - 99 fL Final      Potassium   Date Value Ref Range Status   02/07/2022 3 8 3 5 - 5 3 mmol/L Final   01/20/2022 3 5 3 5 - 5 3 mmol/L Final   10/19/2021 3 5 3 5 - 5 5 mmol/L Final     Chloride   Date Value Ref Range Status   02/07/2022 102 96 - 108 mmol/L Final   01/20/2022 106 96 - 108 mmol/L Final   10/19/2021 104 98 - 107 mmol/L Final CO2   Date Value Ref Range Status   02/07/2022 25 21 - 32 mmol/L Final   01/20/2022 26 21 - 32 mmol/L Final   10/19/2021 25 21 - 31 mmol/L Final     BUN   Date Value Ref Range Status   02/07/2022 18 5 - 25 mg/dL Final   01/20/2022 18 5 - 25 mg/dL Final   10/19/2021 20 7 - 25 mg/dL Final     Creatinine   Date Value Ref Range Status   02/07/2022 1 24 0 60 - 1 30 mg/dL Final     Comment:     Standardized to IDMS reference method   01/20/2022 1 37 (H) 0 60 - 1 30 mg/dL Final     Comment:     Standardized to IDMS reference method   10/19/2021 1 07 0 70 - 1 30 mg/dL Final     Comment:     Standardized to IDMS reference method     Calcium   Date Value Ref Range Status   02/07/2022 9 6 8 4 - 10 2 mg/dL Final   01/20/2022 9 0 8 4 - 10 2 mg/dL Final   10/19/2021 9 3 8 6 - 10 5 mg/dL Final     Albumin   Date Value Ref Range Status   02/07/2022 4 3 3 5 - 5 0 g/dL Final   01/20/2022 4 3 3 5 - 5 0 g/dL Final   10/19/2021 3 9 3 5 - 5 7 g/dL Final     Total Bilirubin   Date Value Ref Range Status   02/07/2022 0 41 0 20 - 1 00 mg/dL Final   01/20/2022 0 34 0 20 - 1 00 mg/dL Final     Comment:     Use of this assay is not recommended for patients undergoing treatment with eltrombopag due to the potential for falsely elevated results  10/19/2021 0 30 0 20 - 1 00 mg/dL Final     Alkaline Phosphatase   Date Value Ref Range Status   02/07/2022 73 34 - 104 U/L Final   01/20/2022 59 34 - 104 U/L Final   10/19/2021 89 40 - 150 U/L Final     AST   Date Value Ref Range Status   02/07/2022 22 13 - 39 U/L Final     Comment:     Specimen collection should occur prior to Sulfasalazine administration due to the potential for falsely depressed results  01/20/2022 20 13 - 39 U/L Final     Comment:     Specimen collection should occur prior to Sulfasalazine administration due to the potential for falsely depressed results      10/19/2021 21 13 - 39 U/L Final     Comment:     Specimen collection should occur prior to Sulfasalazine administration due to the potential for falsely depressed results  ALT   Date Value Ref Range Status   02/07/2022 22 7 - 52 U/L Final     Comment:     Specimen collection should occur prior to Sulfasalazine administration due to the potential for falsely depressed results  01/20/2022 20 7 - 52 U/L Final     Comment:     Specimen collection should occur prior to Sulfasalazine administration due to the potential for falsely depressed results  10/19/2021 24 7 - 52 U/L Final     Comment:     Specimen collection should occur prior to Sulfasalazine administration due to the potential for falsely depressed results  LD   Date Value Ref Range Status   02/07/2022 195 140 - 271 U/L Final   01/20/2022 160 140 - 271 U/L Final   10/19/2021 193 84 - 246 U/L Final     No results found for: TSH  No results found for: G6FFCOQ   No results found for: FREET4      RECENT IMAGING:  Procedure: CT chest abdomen pelvis w contrast    Result Date: 1/25/2022  Narrative: CT CHEST, ABDOMEN AND PELVIS WITH IV CONTRAST INDICATION:   C43 9: Malignant melanoma of skin, unspecified C77 9: Secondary and unspecified malignant neoplasm of lymph node, unspecified  COMPARISON:  CT chest abdomen pelvis 10/19/2021  PET CT 11/5/2021  TECHNIQUE: CT examination of the chest, abdomen and pelvis was performed  Axial, sagittal, and coronal 2D reformatted images were created from the source data and submitted for interpretation  Radiation dose length product (DLP) for this visit:  994 04 mGy-cm   This examination, like all CT scans performed in the [facility], was performed utilizing techniques to minimize radiation dose exposure, including the use of iterative  reconstruction and automated exposure control  IV Contrast:  100 mL of iohexol (OMNIPAQUE) Enteric Contrast: Enteric contrast was administered  FINDINGS: CHEST LUNGS:  Previously seen peripheral groundglass opacities are much improved   A few residual mild opacities are present and could represent postinfectious scarring  No acute pulmonary findings  No endotracheal or endobronchial lesion  PLEURA:  Unremarkable  HEART/GREAT VESSELS: Heart is unremarkable for patient's age  No thoracic aortic aneurysm  MEDIASTINUM AND MAGALYS:  Unremarkable  CHEST WALL AND LOWER NECK:   Left posterior para midline chest wall surgical scar decreased in thickness when compared with the prior CT  ABDOMEN LIVER/BILIARY TREE:  Unchanged well-circumscribed 8 mm segment 5 hypodensity, likely a cyst  #2/55 GALLBLADDER:  No calcified gallstones  No pericholecystic inflammatory change  SPLEEN:  Unremarkable  PANCREAS:  Unremarkable  ADRENAL GLANDS:  Unremarkable  KIDNEYS/URETERS:  Unchanged subcentimeter left renal cyst  No hydronephrosis  STOMACH AND BOWEL:  There is colonic diverticulosis without evidence of acute diverticulitis  APPENDIX:  No findings to suggest appendicitis  ABDOMINOPELVIC CAVITY:  No ascites  No pneumoperitoneum  No lymphadenopathy  Previously seen fat stranding/soft tissue infiltration adjacent to the pancreas has resolved  VESSELS:  Unremarkable for patient's age  PELVIS REPRODUCTIVE ORGANS:  Unremarkable for patient's age  URINARY BLADDER:  Unremarkable  ABDOMINAL WALL/INGUINAL REGIONS:  Unremarkable  OSSEOUS STRUCTURES:  No acute fracture or destructive osseous lesion  Impression: Previously seen fat stranding/soft tissue infiltration adjacent to the pancreas has resolved  Pulmonary groundglass opacity seen on the prior study are much improved  A few residual mild opacities are present and could represent postinfectious scarring   No signs of metastatic disease in the chest, abdomen or pelvis in this patient with a history of lymphoma Workstation performed: [id number]            Assessment/Plan  Mr Ingrid Marinelli is a 48 yr male with Stage IIIB melanoma on adjuvant treatment with dabrafenib and trametinib here for monitoring of blood pressure after starting blood pressure medication    1  Hypertension, unspecified type  Improved on amlodipine and lisinopril  Systolic number is good and diastolic better is slightly improved, but could be better  He will see a new PCP new next week and will let them titrate medication and monitor his BP further    2  Malignant melanoma of upper back ([facility] [address] )  3  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

## 2022-02-11 NOTE — LETTER
February 11, 2022     Eliceo Lamar DO  [address] [place] [address]    Patient: Nancy Hurt   YOB: 1975   Date of Visit: 2/11/2022       Dear Dr Norm Rod:    Thank you for referring Alysha Rosas to me for evaluation  Below are my notes for this consultation  If you have questions, please do not hesitate to call me  I look forward to following your patient along with you  Sincerely,        Benji aDle MD        CC: MD Jm Elise, Warner Izquierdo MD  2/11/2022  3:53 PM  Sign when Signing Visit  [address] [place] [address] Cecille Caciola [address]  [phone]     Date of Visit: 2/11/2022  Name: Nancy Hurt   YOB: 1975       Subjective    VISIT DIAGNOSIS:  Diagnoses and all orders for this visit:    Hypertension, unspecified type    Malignant melanoma of upper back [facility])    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 [facility])        Oncology History   Malignant melanoma of upper back ([facility] [address] )   1/4/2020 Biopsy    Left upper back shave biopsy:  - Malignant melanoma 2 3mm    Mitosis: 6  Ulceration: not identified     2/2/2021 Surgery    Wide excision with sentinel lymph node biopsy     2/2/2021 -  Cancer Staged    Staging form: Melanoma of the Skin, AJCC 8th Edition  - Pathologic stage from 2/2/2021: Stage IIIB (pT3a, pN2a, cM0) - Signed by Benji Dale MD on 2/4/2022  Stage prefix: Initial diagnosis       4/8/2021 Genomic Testing    Decision DX: Class 1B     4/2021 -  Chemotherapy     Tafinalar 150 mg b i d  Mekinist 2 mg p o   Daily        Cancer Staging  Malignant melanoma of upper back [facility])  Staging form: Melanoma of the Skin, AJCC 8th Edition  - Pathologic stage from 2/2/2021: Stage IIIB (pT3a, pN2a, cM0) - Signed by Benji Dale MD on 2/4/2022         HISTORY OF PRESENT ILLNESS: Nancy Hurt is a 48 y o  male  who has stage IIIB melanoma on dabrafenib and trametinib here for follow-up for elevated blood pressure at his last visit  Due to his elevated blood pressure we started amlodipine and lisinopril and he is doing much better  Blood pressure better today  He states that he feels better as well  Did realize he felt as horrible as he did until his blood pressure was lowered and he felt better  Drinking more water also  States that he is sleeping better and just overall has more energy and tolerance for daily activity  Denies shortness of breath or chest pain  Denies any lower extremity swelling  Denies any cough  REVIEW OF SYSTEMS:  Review of Systems   Constitutional: Negative for appetite change, fatigue, fever and unexpected weight change  HENT:   Negative for lump/mass  Eyes: Negative for icterus  Respiratory: Negative for cough, shortness of breath and wheezing  Cardiovascular: Negative for leg swelling  Gastrointestinal: Negative for abdominal pain, constipation, diarrhea, nausea and vomiting  Genitourinary: Negative for difficulty urinating and hematuria  Musculoskeletal: Negative for arthralgias, gait problem and myalgias  Skin: Negative for itching and rash  No new, changing, or concerning lesions  Neurological: Negative for extremity weakness, gait problem, headaches, light-headedness and numbness  Hematological: Negative for adenopathy  MEDICATIONS:    Current Outpatient Medications:     amLODIPine (NORVASC) 5 mg tablet, Take 1 tablet (5 mg total) by mouth daily, Disp: 30 tablet, Rfl: 3    dabrafenib (TAFINLAR) 75 MG capsule, Take 2 capsules (150 mg total) by mouth 2 (two) times a day Take on an empty stomach, Disp: 120 capsule, Rfl: 2    lisinopril (ZESTRIL) 10 mg tablet, Take 1 tablet (10 mg total) by mouth daily, Disp: 30 tablet, Rfl: 3    Trametinib Dimethyl Sulfoxide 2 MG TABS, Take 1 tablet (2 mg total) by mouth daily, Disp: 30 tablet, Rfl: 2  No current facility-administered medications for this visit       ALLERGIES:  No Known Allergies     ACTIVE PROBLEMS:  Patient Active Problem List   Diagnosis    Malignant melanoma of upper back ([facility] [address] )    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 ([facility] [address] )          PAST MEDICAL HISTORY:   History reviewed  No pertinent past medical history  PAST SURGICAL HISTORY:  Past Surgical History:   Procedure Laterality Date    LYMPH NODE BIOPSY N/A 2/2/2021    Procedure: BIOPSY LYMPH NODE LEFT AXILLARY SENTINEL;  Surgeon: Ameena Isaac MD;  Location: [facility];  Service: Surgical Oncology    NO PAST SURGERIES      SKIN LESION EXCISION Left 2/2/2021    Procedure: WIDE EXCISION MELANOMA SCAR UPPER BACK, intraoperative lymphatic mapping, sentinel lymph node biopsy; [address];  Surgeon: Ameena Isaac MD;  Location: [facility];  Service: Surgical Oncology        SOCIAL HISTORY:  Social History     Socioeconomic History    Marital status: /Civil Union     Spouse name: None    Number of children: None    Years of education: None    Highest education level: None   Occupational History    None   Tobacco Use    Smoking status: Never Smoker    Smokeless tobacco: Never Used   Vaping Use    Vaping Use: Never used   Substance and Sexual Activity    Alcohol use:  Yes    Drug use: Never    Sexual activity: None   Other Topics Concern    None   Social History Narrative    None     Social Determinants of Health     Financial Resource Strain: Not on file   Food Insecurity: Not on file   Transportation Needs: Not on file   Physical Activity: Not on file   Stress: Not on file   Social Connections: Not on file   Intimate Partner Violence: Not on file   Housing Stability: Not on file        FAMILY HISTORY:  Family History   Problem Relation Age of Onset    Basal cell carcinoma Father     No Known Problems Mother     Breast cancer Paternal Aunt 43    Breast cancer Paternal Grandmother [de-identified]          Objective    PHYSICAL EXAMINATION:   Blood pressure 138/92, pulse 88, temperature 98 2 °F (36 8 °C), resp  rate 18, height 5' 8" (1 727 m), weight 97 8 kg (215 lb 8 oz), SpO2 97 %  ECOG Performance Status      Most Recent Value   ECOG Performance Status 0 - Fully active, able to carry on all pre-disease performance without restriction             Physical Exam  Vitals reviewed  Constitutional:       General: He is not in acute distress  Appearance: Normal appearance  He is not toxic-appearing  HENT:      Mouth/Throat:      Mouth: Mucous membranes are moist       Pharynx: Oropharynx is clear  Eyes:      General: No scleral icterus  Cardiovascular:      Rate and Rhythm: Normal rate and regular rhythm  Pulses: Normal pulses  Heart sounds: No murmur heard  No friction rub  No gallop  Pulmonary:      Effort: Pulmonary effort is normal  No respiratory distress  Breath sounds: Normal breath sounds  No wheezing or rales  Abdominal:      General: There is no distension  Palpations: There is no mass  Tenderness: There is no abdominal tenderness  There is no rebound  Musculoskeletal:         General: No swelling or tenderness  Right lower leg: No edema  Left lower leg: No edema  Skin:     Findings: No rash  Neurological:      General: No focal deficit present  Mental Status: He is alert and oriented to person, place, and time  Psychiatric:         Mood and Affect: Mood normal          Behavior: Behavior normal          Thought Content: Thought content normal          Judgment: Judgment normal          I reviewed lab data in the chart      WBC   Date Value Ref Range Status   02/07/2022 4 61 4 31 - 10 16 Thousand/uL Final   01/20/2022 4 86 4 31 - 10 16 Thousand/uL Final   10/19/2021 5 70 4 80 - 10 80 Thousand/uL Final     Hemoglobin   Date Value Ref Range Status   02/07/2022 14 2 12 0 - 17 0 g/dL Final   01/20/2022 14 2 12 0 - 17 0 g/dL Final   10/19/2021 14 2 14 0 - 18 0 g/dL Final     Platelets   Date Value Ref Range Status   02/07/2022 202 149 - 390 Thousands/uL Final   01/20/2022 168 149 - 390 Thousands/uL Final   10/19/2021 220 149 - 390 Thousands/uL Final     MCV   Date Value Ref Range Status   02/07/2022 94 82 - 98 fL Final   01/20/2022 95 82 - 98 fL Final   10/19/2021 94 81 - 99 fL Final      Potassium   Date Value Ref Range Status   02/07/2022 3 8 3 5 - 5 3 mmol/L Final   01/20/2022 3 5 3 5 - 5 3 mmol/L Final   10/19/2021 3 5 3 5 - 5 5 mmol/L Final     Chloride   Date Value Ref Range Status   02/07/2022 102 96 - 108 mmol/L Final   01/20/2022 106 96 - 108 mmol/L Final   10/19/2021 104 98 - 107 mmol/L Final     CO2   Date Value Ref Range Status   02/07/2022 25 21 - 32 mmol/L Final   01/20/2022 26 21 - 32 mmol/L Final   10/19/2021 25 21 - 31 mmol/L Final     BUN   Date Value Ref Range Status   02/07/2022 18 5 - 25 mg/dL Final   01/20/2022 18 5 - 25 mg/dL Final   10/19/2021 20 7 - 25 mg/dL Final     Creatinine   Date Value Ref Range Status   02/07/2022 1 24 0 60 - 1 30 mg/dL Final     Comment:     Standardized to IDMS reference method   01/20/2022 1 37 (H) 0 60 - 1 30 mg/dL Final     Comment:     Standardized to IDMS reference method   10/19/2021 1 07 0 70 - 1 30 mg/dL Final     Comment:     Standardized to IDMS reference method     Calcium   Date Value Ref Range Status   02/07/2022 9 6 8 4 - 10 2 mg/dL Final   01/20/2022 9 0 8 4 - 10 2 mg/dL Final   10/19/2021 9 3 8 6 - 10 5 mg/dL Final     Albumin   Date Value Ref Range Status   02/07/2022 4 3 3 5 - 5 0 g/dL Final   01/20/2022 4 3 3 5 - 5 0 g/dL Final   10/19/2021 3 9 3 5 - 5 7 g/dL Final     Total Bilirubin   Date Value Ref Range Status   02/07/2022 0 41 0 20 - 1 00 mg/dL Final   01/20/2022 0 34 0 20 - 1 00 mg/dL Final     Comment:     Use of this assay is not recommended for patients undergoing treatment with eltrombopag due to the potential for falsely elevated results     10/19/2021 0 30 0 20 - 1 00 mg/dL Final     Alkaline Phosphatase   Date Value Ref Range Status   02/07/2022 73 34 - 104 U/L Final 01/20/2022 59 34 - 104 U/L Final   10/19/2021 89 40 - 150 U/L Final     AST   Date Value Ref Range Status   02/07/2022 22 13 - 39 U/L Final     Comment:     Specimen collection should occur prior to Sulfasalazine administration due to the potential for falsely depressed results  01/20/2022 20 13 - 39 U/L Final     Comment:     Specimen collection should occur prior to Sulfasalazine administration due to the potential for falsely depressed results  10/19/2021 21 13 - 39 U/L Final     Comment:     Specimen collection should occur prior to Sulfasalazine administration due to the potential for falsely depressed results  ALT   Date Value Ref Range Status   02/07/2022 22 7 - 52 U/L Final     Comment:     Specimen collection should occur prior to Sulfasalazine administration due to the potential for falsely depressed results  01/20/2022 20 7 - 52 U/L Final     Comment:     Specimen collection should occur prior to Sulfasalazine administration due to the potential for falsely depressed results  10/19/2021 24 7 - 52 U/L Final     Comment:     Specimen collection should occur prior to Sulfasalazine administration due to the potential for falsely depressed results  LD   Date Value Ref Range Status   02/07/2022 195 140 - 271 U/L Final   01/20/2022 160 140 - 271 U/L Final   10/19/2021 193 84 - 246 U/L Final     No results found for: TSH  No results found for: E3HIFUP   No results found for: FREET4      RECENT IMAGING:  Procedure: CT chest abdomen pelvis w contrast    Result Date: 1/25/2022  Narrative: CT CHEST, ABDOMEN AND PELVIS WITH IV CONTRAST INDICATION:   C43 9: Malignant melanoma of skin, unspecified C77 9: Secondary and unspecified malignant neoplasm of lymph node, unspecified  COMPARISON:  CT chest abdomen pelvis 10/19/2021  PET CT 11/5/2021  TECHNIQUE: CT examination of the chest, abdomen and pelvis was performed   Axial, sagittal, and coronal 2D reformatted images were created from the source data and submitted for interpretation  Radiation dose length product (DLP) for this visit:  994 04 mGy-cm   This examination, like all CT scans performed in the [facility], was performed utilizing techniques to minimize radiation dose exposure, including the use of iterative  reconstruction and automated exposure control  IV Contrast:  100 mL of iohexol (OMNIPAQUE) Enteric Contrast: Enteric contrast was administered  FINDINGS: CHEST LUNGS:  Previously seen peripheral groundglass opacities are much improved  A few residual mild opacities are present and could represent postinfectious scarring  No acute pulmonary findings  No endotracheal or endobronchial lesion  PLEURA:  Unremarkable  HEART/GREAT VESSELS: Heart is unremarkable for patient's age  No thoracic aortic aneurysm  MEDIASTINUM AND MAGALYS:  Unremarkable  CHEST WALL AND LOWER NECK:   Left posterior para midline chest wall surgical scar decreased in thickness when compared with the prior CT  ABDOMEN LIVER/BILIARY TREE:  Unchanged well-circumscribed 8 mm segment 5 hypodensity, likely a cyst  #2/55 GALLBLADDER:  No calcified gallstones  No pericholecystic inflammatory change  SPLEEN:  Unremarkable  PANCREAS:  Unremarkable  ADRENAL GLANDS:  Unremarkable  KIDNEYS/URETERS:  Unchanged subcentimeter left renal cyst  No hydronephrosis  STOMACH AND BOWEL:  There is colonic diverticulosis without evidence of acute diverticulitis  APPENDIX:  No findings to suggest appendicitis  ABDOMINOPELVIC CAVITY:  No ascites  No pneumoperitoneum  No lymphadenopathy  Previously seen fat stranding/soft tissue infiltration adjacent to the pancreas has resolved  VESSELS:  Unremarkable for patient's age  PELVIS REPRODUCTIVE ORGANS:  Unremarkable for patient's age  URINARY BLADDER:  Unremarkable  ABDOMINAL WALL/INGUINAL REGIONS:  Unremarkable  OSSEOUS STRUCTURES:  No acute fracture or destructive osseous lesion       Impression: Previously seen fat stranding/soft tissue infiltration adjacent to the pancreas has resolved  Pulmonary groundglass opacity seen on the prior study are much improved  A few residual mild opacities are present and could represent postinfectious scarring  No signs of metastatic disease in the chest, abdomen or pelvis in this patient with a history of lymphoma Workstation performed: [id number]            Assessment/Plan  Mr Kraig Hoff is a 48 yr male with Stage IIIB melanoma on adjuvant treatment with dabrafenib and trametinib here for monitoring of blood pressure after starting blood pressure medication    1  Hypertension, unspecified type  Improved on amlodipine and lisinopril  Systolic number is good and diastolic better is slightly improved, but could be better  He will see a new PCP new next week and will let them titrate medication and monitor his BP further    2  Malignant melanoma of upper back ([facility] [address] )  3  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

## 2022-02-16 ENCOUNTER — OFFICE VISIT (OUTPATIENT)
Dept: FAMILY MEDICINE CLINIC | Facility: CLINIC | Age: 47
End: 2022-02-16
Payer: COMMERCIAL

## 2022-02-16 VITALS
DIASTOLIC BLOOD PRESSURE: 92 MMHG | SYSTOLIC BLOOD PRESSURE: 158 MMHG | OXYGEN SATURATION: 98 % | WEIGHT: 216.5 LBS | TEMPERATURE: 98.3 F | HEIGHT: 68 IN | BODY MASS INDEX: 32.81 KG/M2 | HEART RATE: 87 BPM

## 2022-02-16 DIAGNOSIS — Z11.59 NEED FOR HEPATITIS C SCREENING TEST: ICD-10-CM

## 2022-02-16 DIAGNOSIS — Z23 ENCOUNTER FOR IMMUNIZATION: ICD-10-CM

## 2022-02-16 DIAGNOSIS — R73.01 IFG (IMPAIRED FASTING GLUCOSE): ICD-10-CM

## 2022-02-16 DIAGNOSIS — C43.9 MELANOMA METASTATIC TO LYMPH NODE (HCC): ICD-10-CM

## 2022-02-16 DIAGNOSIS — C77.9 MELANOMA METASTATIC TO LYMPH NODE (HCC): ICD-10-CM

## 2022-02-16 DIAGNOSIS — Z11.4 SCREENING FOR HIV (HUMAN IMMUNODEFICIENCY VIRUS): ICD-10-CM

## 2022-02-16 DIAGNOSIS — Z76.89 ENCOUNTER TO ESTABLISH CARE WITH NEW DOCTOR: Primary | ICD-10-CM

## 2022-02-16 DIAGNOSIS — I10 PRIMARY HYPERTENSION: ICD-10-CM

## 2022-02-16 DIAGNOSIS — E66.09 CLASS 1 OBESITY DUE TO EXCESS CALORIES WITHOUT SERIOUS COMORBIDITY WITH BODY MASS INDEX (BMI) OF 32.0 TO 32.9 IN ADULT: ICD-10-CM

## 2022-02-16 DIAGNOSIS — Z13.31 NEGATIVE DEPRESSION SCREENING: ICD-10-CM

## 2022-02-16 PROCEDURE — 90471 IMMUNIZATION ADMIN: CPT

## 2022-02-16 PROCEDURE — 3725F SCREEN DEPRESSION PERFORMED: CPT | Performed by: FAMILY MEDICINE

## 2022-02-16 PROCEDURE — 3077F SYST BP >= 140 MM HG: CPT | Performed by: FAMILY MEDICINE

## 2022-02-16 PROCEDURE — 99203 OFFICE O/P NEW LOW 30 MIN: CPT | Performed by: FAMILY MEDICINE

## 2022-02-16 PROCEDURE — 1036F TOBACCO NON-USER: CPT | Performed by: FAMILY MEDICINE

## 2022-02-16 PROCEDURE — 90715 TDAP VACCINE 7 YRS/> IM: CPT

## 2022-02-16 PROCEDURE — 3008F BODY MASS INDEX DOCD: CPT | Performed by: FAMILY MEDICINE

## 2022-02-16 PROCEDURE — 3080F DIAST BP >= 90 MM HG: CPT | Performed by: FAMILY MEDICINE

## 2022-02-16 RX ORDER — LISINOPRIL 20 MG/1
20 TABLET ORAL DAILY
Qty: 30 TABLET | Refills: 3 | Status: SHIPPED | OUTPATIENT
Start: 2022-02-16 | End: 2022-03-11 | Stop reason: SDUPTHER

## 2022-02-16 NOTE — PROGRESS NOTES
Assessment/Plan:    No problem-specific Assessment & Plan notes found for this encounter  Diagnoses and all orders for this visit:    Encounter to establish care with new doctor    Primary hypertension  -     Comprehensive metabolic panel; Future  -     Lipid panel; Future  -     TSH, 3rd generation with Free T4 reflex; Future  -     lisinopril (ZESTRIL) 20 mg tablet; Take 1 tablet (20 mg total) by mouth daily    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 (HCC)    Need for hepatitis C screening test  -     Hepatitis C Antibody ([facility]); Future    Screening for HIV (human immunodeficiency virus)  -     HIV 1/2 Antigen/Antibody (4th Generation) w Reflex [facility]; Future    Encounter for immunization  -     TDAP VACCINE GREATER THAN OR EQUAL TO 8YO IM    Negative depression screening    Class 1 obesity due to excess calories without serious comorbidity with body mass index (BMI) of 32 0 to 32 9 in adult    IFG (impaired fasting glucose)  -     HEMOGLOBIN A1C W/ EAG ESTIMATION; Future          PHQ-2/9 Depression Screening    Little interest or pleasure in doing things: 0 - not at all  Feeling down, depressed, or hopeless: 0 - not at all  PHQ-2 Score: 0  PHQ-2 Interpretation: Negative depression screen        BMI Counseling: Body mass index is 32 92 kg/m²  The BMI is above normal  Nutrition recommendations include reducing portion sizes, decreasing overall calorie intake, 3-5 servings of fruits/vegetables daily, reducing fast food intake, consuming healthier snacks, decreasing soda and/or juice intake and moderation in carbohydrate intake  Exercise recommendations include vigorous aerobic physical activity for 75 minutes/week and exercising 3-5 times per week  Subjective:      Patient ID: Claire Lester is a 48 y o  male  Patient presents for new patient establishment, he has a history of hypertension and was started on medications two weeks ago    He has a hx of metastatic melanoma, surgery 2/2/21, he is currently taking medications until April under the direction of oncology  BP taken again and is consistent with original reading  The following portions of the patient's history were reviewed and updated as appropriate: allergies, current medications, past family history, past medical history, past social history, past surgical history and problem list     Review of Systems   Constitutional: Negative  Negative for chills, fatigue and fever  HENT: Negative  Eyes: Negative  Negative for visual disturbance  Respiratory: Negative for cough, chest tightness, shortness of breath and wheezing  Cardiovascular: Negative for chest pain and palpitations  Gastrointestinal: Negative for abdominal pain, blood in stool, constipation, diarrhea, nausea and vomiting  Endocrine: Negative  Genitourinary: Negative for difficulty urinating, dysuria, frequency, hematuria and urgency  Musculoskeletal: Negative for arthralgias, joint swelling and myalgias  Skin: Negative  Allergic/Immunologic: Negative  Neurological: Negative for dizziness, seizures, syncope and light-headedness  Hematological: Negative for adenopathy  Psychiatric/Behavioral: Negative  Negative for hallucinations  The patient is not nervous/anxious  Objective:    /92 (BP Location: Left arm, Patient Position: Sitting, Cuff Size: Large)   Pulse 87   Temp 98 3 °F (36 8 °C) (Tympanic)   Ht 5' 8" (1 727 m)   Wt 98 2 kg (216 lb 8 oz)   SpO2 98%   BMI 32 92 kg/m²      Physical Exam  Vitals and nursing note reviewed  Constitutional:       General: He is not in acute distress  Appearance: Normal appearance  He is well-developed  He is not ill-appearing, toxic-appearing or diaphoretic  HENT:      Head: Normocephalic and atraumatic  Right Ear: Tympanic membrane, ear canal and external ear normal  There is no impacted cerumen        Left Ear: Tympanic membrane, ear canal and external ear normal  There is no impacted cerumen  Nose: Nose normal  No congestion or rhinorrhea  Mouth/Throat:      Mouth: Mucous membranes are moist       Pharynx: Oropharynx is clear  No oropharyngeal exudate or posterior oropharyngeal erythema  Eyes:      General: No scleral icterus  Right eye: No discharge  Left eye: No discharge  Extraocular Movements: Extraocular movements intact  Conjunctiva/sclera: Conjunctivae normal       Pupils: Pupils are equal, round, and reactive to light  Cardiovascular:      Rate and Rhythm: Normal rate and regular rhythm  Pulses: Normal pulses  Heart sounds: Normal heart sounds  No murmur heard  No friction rub  No gallop  Pulmonary:      Effort: Pulmonary effort is normal  No respiratory distress  Breath sounds: Normal breath sounds  No wheezing, rhonchi or rales  Abdominal:      General: Bowel sounds are normal  There is no distension  Palpations: Abdomen is soft  There is no mass  Tenderness: There is no abdominal tenderness  There is no guarding or rebound  Musculoskeletal:         General: No swelling or deformity  Normal range of motion  Cervical back: Normal range of motion and neck supple  No rigidity  Right lower leg: No edema  Left lower leg: No edema  Lymphadenopathy:      Cervical: No cervical adenopathy  Skin:     General: Skin is warm and dry  Findings: No rash  Neurological:      General: No focal deficit present  Mental Status: He is alert and oriented to person, place, and time  Sensory: No sensory deficit  Motor: No weakness  Coordination: Coordination normal       Gait: Gait normal       Deep Tendon Reflexes: Reflexes are normal and symmetric  Reflexes normal    Psychiatric:         Mood and Affect: Mood normal          Behavior: Behavior normal          Thought Content:  Thought content normal          Judgment: Judgment normal

## 2022-03-02 ENCOUNTER — HOSPITAL ENCOUNTER (OUTPATIENT)
Dept: NON INVASIVE DIAGNOSTICS | Facility: HOSPITAL | Age: 47
Discharge: HOME | End: 2022-03-02
Payer: COMMERCIAL

## 2022-03-02 ENCOUNTER — PATIENT MESSAGE (OUTPATIENT)
Dept: HEMATOLOGY ONCOLOGY | Facility: CLINIC | Age: 47
End: 2022-03-02

## 2022-03-02 ENCOUNTER — APPOINTMENT (OUTPATIENT)
Dept: LAB | Facility: HOSPITAL | Age: 47
End: 2022-03-02
Payer: COMMERCIAL

## 2022-03-02 VITALS
HEIGHT: 68 IN | HEART RATE: 80 BPM | SYSTOLIC BLOOD PRESSURE: 130 MMHG | DIASTOLIC BLOOD PRESSURE: 70 MMHG | BODY MASS INDEX: 32.74 KG/M2 | WEIGHT: 216 LBS

## 2022-03-02 DIAGNOSIS — I10 HYPERTENSION, UNSPECIFIED TYPE: ICD-10-CM

## 2022-03-02 DIAGNOSIS — C43.59 MALIGNANT MELANOMA OF UPPER BACK (HCC): ICD-10-CM

## 2022-03-02 DIAGNOSIS — Z11.4 SCREENING FOR HIV (HUMAN IMMUNODEFICIENCY VIRUS): ICD-10-CM

## 2022-03-02 DIAGNOSIS — Z79.899 HIGH RISK MEDICATIONS (NOT ANTICOAGULANTS) LONG-TERM USE: ICD-10-CM

## 2022-03-02 DIAGNOSIS — C43.9 MELANOMA METASTATIC TO LYMPH NODE (HCC): ICD-10-CM

## 2022-03-02 DIAGNOSIS — C77.9 MELANOMA METASTATIC TO LYMPH NODE (HCC): ICD-10-CM

## 2022-03-02 DIAGNOSIS — R73.01 IFG (IMPAIRED FASTING GLUCOSE): ICD-10-CM

## 2022-03-02 DIAGNOSIS — I10 PRIMARY HYPERTENSION: ICD-10-CM

## 2022-03-02 DIAGNOSIS — Z11.59 NEED FOR HEPATITIS C SCREENING TEST: ICD-10-CM

## 2022-03-02 LAB
CHOLEST SERPL-MCNC: 218 MG/DL
HCV AB SER QL: NORMAL
HDLC SERPL-MCNC: 44 MG/DL
LDLC SERPL CALC-MCNC: 153 MG/DL (ref 0–100)
NONHDLC SERPL-MCNC: 174 MG/DL
TRIGL SERPL-MCNC: 103 MG/DL
TSH SERPL DL<=0.05 MIU/L-ACNC: 2.21 UIU/ML (ref 0.45–5.33)

## 2022-03-02 PROCEDURE — 86803 HEPATITIS C AB TEST: CPT

## 2022-03-02 PROCEDURE — 87389 HIV-1 AG W/HIV-1&-2 AB AG IA: CPT

## 2022-03-02 PROCEDURE — 80061 LIPID PANEL: CPT

## 2022-03-02 PROCEDURE — 84443 ASSAY THYROID STIM HORMONE: CPT

## 2022-03-02 PROCEDURE — 93306 TTE W/DOPPLER COMPLETE: CPT

## 2022-03-02 PROCEDURE — 83036 HEMOGLOBIN GLYCOSYLATED A1C: CPT

## 2022-03-02 RX ORDER — AMLODIPINE BESYLATE 5 MG/1
5 TABLET ORAL DAILY
Qty: 30 TABLET | Refills: 3 | Status: SHIPPED | OUTPATIENT
Start: 2022-03-02

## 2022-03-03 ENCOUNTER — OFFICE VISIT (OUTPATIENT)
Dept: HEMATOLOGY ONCOLOGY | Facility: CLINIC | Age: 47
End: 2022-03-03
Payer: COMMERCIAL

## 2022-03-03 VITALS
OXYGEN SATURATION: 99 % | SYSTOLIC BLOOD PRESSURE: 132 MMHG | BODY MASS INDEX: 32.74 KG/M2 | HEIGHT: 68 IN | DIASTOLIC BLOOD PRESSURE: 90 MMHG | TEMPERATURE: 97.6 F | WEIGHT: 216 LBS | HEART RATE: 101 BPM | RESPIRATION RATE: 16 BRPM

## 2022-03-03 DIAGNOSIS — Z79.899 HIGH RISK MEDICATION USE: ICD-10-CM

## 2022-03-03 DIAGNOSIS — C43.59 MALIGNANT MELANOMA OF UPPER BACK (HCC): Primary | ICD-10-CM

## 2022-03-03 DIAGNOSIS — C77.9 MELANOMA METASTATIC TO LYMPH NODE (HCC): ICD-10-CM

## 2022-03-03 DIAGNOSIS — I10 PRIMARY HYPERTENSION: ICD-10-CM

## 2022-03-03 DIAGNOSIS — C43.9 MELANOMA METASTATIC TO LYMPH NODE (HCC): ICD-10-CM

## 2022-03-03 LAB
AORTIC ROOT: 3.6 CM
APICAL FOUR CHAMBER EJECTION FRACTION: 49 %
AV LVOT MEAN GRADIENT: 2 MMHG
AV LVOT PEAK GRADIENT: 5 MMHG
DOP CALC LVOT PEAK VEL VTI: 15.62 CM
DOP CALC LVOT PEAK VEL: 1.1 M/S
DOP CALC RVOT PEAK VEL: 0.96 M/S
DOP CALC RVOT VTI: 16.54 CM
E WAVE DECELERATION TIME: 213 MS
EST. AVERAGE GLUCOSE BLD GHB EST-MCNC: 131 MG/DL
FRACTIONAL SHORTENING: 37 % (ref 28–44)
HBA1C MFR BLD: 6.2 %
HIV 1+2 AB+HIV1 P24 AG SERPL QL IA: NORMAL
INTERVENTRICULAR SEPTUM IN DIASTOLE (PARASTERNAL SHORT AXIS VIEW): 0.8 CM
INTERVENTRICULAR SEPTUM: 0.8 CM (ref 0.55–1.04)
LEFT ATRIUM SIZE: 3.2 CM
LEFT INTERNAL DIMENSION IN SYSTOLE: 3.3 CM (ref 3.71–5.62)
LEFT VENTRICULAR INTERNAL DIMENSION IN DIASTOLE: 5.2 CM (ref 6.16–9.17)
LEFT VENTRICULAR POSTERIOR WALL IN END DIASTOLE: 0.8 CM (ref 0.54–1.02)
LEFT VENTRICULAR STROKE VOLUME: 84 ML
LVSV (TEICH): 84 ML
MV E'TISSUE VEL-SEP: 13 CM/S
MV PEAK A VEL: 0.68 M/S
MV PEAK E VEL: 71 CM/S
MV STENOSIS PRESSURE HALF TIME: 62 MS
MV VALVE AREA P 1/2 METHOD: 3.55 CM2
PV MEAN GRADIENT: 3 MMHG
PV MEAN VELOCITY: 85 CM/S
PV PEAK GRADIENT: 7 MMHG
PV VTI: 20.35 CM
SL CV LV EF: 55
SL CV PED ECHO LEFT VENTRICLE DIASTOLIC VOLUME (MOD BIPLANE) 2D: 127 ML
SL CV PED ECHO LEFT VENTRICLE SYSTOLIC VOLUME (MOD BIPLANE) 2D: 44 ML
SL CV RVOT MAX GRADIENT: 4 MMHG
SL CV RVOT MEAN GRADIENT: 2 MMHG
SL CV RVOT VMEAN: 0.67 M/S
TR MAX PG: 21 MMHG
TR PEAK VELOCITY: 2.3 M/S
TRICUSPID VALVE PEAK REGURGITATION VELOCITY: 2.3 M/S
Z-SCORE OF INTERVENTRICULAR SEPTUM IN END DIASTOLE: 0.04
Z-SCORE OF LEFT VENTRICULAR DIMENSION IN END DIASTOLE: -3.68
Z-SCORE OF LEFT VENTRICULAR DIMENSION IN END SYSTOLE: -2.56
Z-SCORE OF LEFT VENTRICULAR POSTERIOR WALL IN END DIASTOLE: 0.15

## 2022-03-03 PROCEDURE — 99214 OFFICE O/P EST MOD 30 MIN: CPT | Performed by: INTERNAL MEDICINE

## 2022-03-03 PROCEDURE — 93306 TTE W/DOPPLER COMPLETE: CPT | Performed by: INTERNAL MEDICINE

## 2022-03-03 NOTE — LETTER
March 8, 2022     Edel Collier, DO  [address] [address]    Patient: Alton Tovar   YOB: 1975   Date of Visit: 3/3/2022       Dear Dr Wayne Gonzalez:    Thank you for referring Duane Kim to me for evaluation  Below are my notes for this consultation  If you have questions, please do not hesitate to call me  I look forward to following your patient along with you  Sincerely,        Gordy Freitas MD        CC: MD Kellie Angelo, Sylvia Baez MD  3/8/2022  3:47 PM  Sign when Signing Visit  [address]  [phone]  [phone]     Date of Visit: 3/3/2022  Name: Alton Tovar   YOB: 1975       Subjective    VISIT DIAGNOSIS:  Diagnoses and all orders for this visit:    Malignant melanoma of upper back [facility])    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 ([facility] [address] )    Primary hypertension    High risk medication use        Oncology History   Malignant melanoma of upper back ([facility] [address] )   1/4/2020 Biopsy    Left upper back shave biopsy:  - Malignant melanoma 2 3mm    Mitosis: 6  Ulceration: not identified     2/2/2021 Surgery    Wide excision with sentinel lymph node biopsy     2/2/2021 -  Cancer Staged    Staging form: Melanoma of the Skin, AJCC 8th Edition  - Pathologic stage from 2/2/2021: Stage IIIB (pT3a, pN2a, cM0) - Signed by Gordy Freitas MD on 2/4/2022  Stage prefix: Initial diagnosis       4/8/2021 Genomic Testing    Decision DX: Class 1B     4/2021 -  Chemotherapy     Tafinalar 150 mg b i d  Mekinist 2 mg p o   Daily        Cancer Staging  Malignant melanoma of upper back [facility])  Staging form: Melanoma of the Skin, AJCC 8th Edition  - Pathologic stage from 2/2/2021: Stage IIIB (pT3a, pN2a, cM0) - Signed by Gordy Freitas MD on 2/4/2022     [No treatment plan]     HISTORY OF PRESENT ILLNESS: Alton Tovar is a 48 y o  male  who has Stage IIIB melanoma on adjuvant treatment with encorafenib and binimetinib here for follow up  He is doing well and tolerating treatment  Had his ECHO which demonstrates EF of 55%  BP improved with medication, which he is tolerating, and he follows up with primary care within a week and will address this issue  As rash, itching, diarrhea, abdominal pain, nausea, vomiting, chest pain, shortness of breast, and swelling  REVIEW OF SYSTEMS:  Review of Systems   Constitutional: Negative for appetite change, fatigue, fever and unexpected weight change  HENT:   Negative for lump/mass  Eyes: Negative for icterus  Respiratory: Negative for cough, shortness of breath and wheezing  Cardiovascular: Negative for leg swelling  Gastrointestinal: Negative for abdominal pain, constipation, diarrhea, nausea and vomiting  Genitourinary: Negative for difficulty urinating and hematuria  Musculoskeletal: Negative for arthralgias, gait problem and myalgias  Skin: Negative for itching and rash  No new, changing, or concerning lesions  Neurological: Negative for extremity weakness, gait problem, headaches, light-headedness and numbness  Hematological: Negative for adenopathy          MEDICATIONS:    Current Outpatient Medications:     amLODIPine (NORVASC) 5 mg tablet, Take 1 tablet (5 mg total) by mouth daily, Disp: 30 tablet, Rfl: 3    dabrafenib (TAFINLAR) 75 MG capsule, Take 2 capsules (150 mg total) by mouth 2 (two) times a day Take on an empty stomach, Disp: 120 capsule, Rfl: 2    lisinopril (ZESTRIL) 20 mg tablet, Take 1 tablet (20 mg total) by mouth daily, Disp: 30 tablet, Rfl: 3    Trametinib Dimethyl Sulfoxide 2 MG TABS, Take 1 tablet (2 mg total) by mouth daily, Disp: 30 tablet, Rfl: 2     ALLERGIES:  No Known Allergies     ACTIVE PROBLEMS:  Patient Active Problem List   Diagnosis    Malignant melanoma of upper back ([facility] [address] )    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 ([facility] [address] )    Primary hypertension          PAST MEDICAL HISTORY: No past medical history on file  PAST SURGICAL HISTORY:  Past Surgical History:   Procedure Laterality Date    LYMPH NODE BIOPSY N/A 2/2/2021    Procedure: BIOPSY LYMPH NODE LEFT AXILLARY SENTINEL;  Surgeon: Lola Andrade MD;  [facility]: [facility];  Service: Surgical Oncology    NO PAST SURGERIES      SKIN LESION EXCISION Left 2/2/2021    Procedure: WIDE EXCISION MELANOMA SCAR UPPER BACK, intraoperative lymphatic mapping, sentinel lymph node biopsy; [address];  Surgeon: Lola Andrade MD;  Location: [facility];  Service: Surgical Oncology        SOCIAL HISTORY:  Social History     Socioeconomic History    Marital status: /Civil Union     Spouse name: Not on file    Number of children: Not on file    Years of education: Not on file    Highest education level: Not on file   Occupational History    Not on file   Tobacco Use    Smoking status: Never Smoker    Smokeless tobacco: Never Used   Vaping Use    Vaping Use: Never used   Substance and Sexual Activity    Alcohol use: Yes    Drug use: Never    Sexual activity: Not on file   Other Topics Concern    Not on file   Social History Narrative    Not on file     Social Determinants of Health     Financial Resource Strain: Not on file   Food Insecurity: Not on file   Transportation Needs: Not on file   Physical Activity: Not on file   Stress: Not on file   Social Connections: Not on file   Intimate Partner Violence: Not on file   Housing Stability: Not on file        FAMILY HISTORY:  Family History   Problem Relation Age of Onset    Basal cell carcinoma Father     No Known Problems Mother     Breast cancer Paternal Aunt 43    Breast cancer Paternal Grandmother [de-identified]          Objective    PHYSICAL EXAMINATION:   Blood pressure 132/90, pulse 101, temperature 97 6 °F (36 4 °C), resp  rate 16, height 5' 8" (1 727 m), weight 98 kg (216 lb), SpO2 99 %       ECOG Performance Status      Most Recent Value   ECOG Performance Status 0 - Fully active, able to carry on all pre-disease performance without restriction             Physical Exam  Constitutional:       General: He is not in acute distress  Appearance: Normal appearance  He is not toxic-appearing  HENT:      Mouth/Throat:      Mouth: Mucous membranes are moist       Pharynx: Oropharynx is clear  Eyes:      General: No scleral icterus  Cardiovascular:      Rate and Rhythm: Normal rate and regular rhythm  Pulses: Normal pulses  Heart sounds: No murmur heard  No friction rub  No gallop  Pulmonary:      Effort: Pulmonary effort is normal  No respiratory distress  Breath sounds: Normal breath sounds  No wheezing or rales  Chest:   Breasts:      Right: No axillary adenopathy or supraclavicular adenopathy  Left: No axillary adenopathy or supraclavicular adenopathy  Abdominal:      General: There is no distension  Palpations: There is no mass  Tenderness: There is no abdominal tenderness  There is no rebound  Musculoskeletal:         General: No swelling or tenderness  Right lower leg: No edema  Left lower leg: No edema  Lymphadenopathy:      Head:      Right side of head: No submandibular, preauricular or posterior auricular adenopathy  Left side of head: No submandibular, preauricular or posterior auricular adenopathy  Cervical: No cervical adenopathy  Right cervical: No superficial or posterior cervical adenopathy  Left cervical: No superficial or posterior cervical adenopathy  Upper Body:      Right upper body: No supraclavicular or axillary adenopathy  Left upper body: No supraclavicular or axillary adenopathy  Skin:     Findings: No rash  Comments: No concerning skin lesions  Neurological:      General: No focal deficit present  Mental Status: He is alert and oriented to person, place, and time     Psychiatric:         Mood and Affect: Mood normal          Behavior: Behavior normal  Thought Content: Thought content normal          Judgment: Judgment normal          I reviewed lab data in the chart      WBC   Date Value Ref Range Status   03/02/2022 4 88 4 31 - 10 16 Thousand/uL Final   02/07/2022 4 61 4 31 - 10 16 Thousand/uL Final   01/20/2022 4 86 4 31 - 10 16 Thousand/uL Final     Hemoglobin   Date Value Ref Range Status   03/02/2022 14 7 12 0 - 17 0 g/dL Final   02/07/2022 14 2 12 0 - 17 0 g/dL Final   01/20/2022 14 2 12 0 - 17 0 g/dL Final     Platelets   Date Value Ref Range Status   03/02/2022 155 149 - 390 Thousands/uL Final   02/07/2022 202 149 - 390 Thousands/uL Final   01/20/2022 168 149 - 390 Thousands/uL Final     MCV   Date Value Ref Range Status   03/02/2022 96 82 - 98 fL Final   02/07/2022 94 82 - 98 fL Final   01/20/2022 95 82 - 98 fL Final      Potassium   Date Value Ref Range Status   03/02/2022 3 8 3 5 - 5 3 mmol/L Final   02/07/2022 3 8 3 5 - 5 3 mmol/L Final   01/20/2022 3 5 3 5 - 5 3 mmol/L Final     Chloride   Date Value Ref Range Status   03/02/2022 102 96 - 108 mmol/L Final   02/07/2022 102 96 - 108 mmol/L Final   01/20/2022 106 96 - 108 mmol/L Final     CO2   Date Value Ref Range Status   03/02/2022 25 21 - 32 mmol/L Final   02/07/2022 25 21 - 32 mmol/L Final   01/20/2022 26 21 - 32 mmol/L Final     BUN   Date Value Ref Range Status   03/02/2022 25 5 - 25 mg/dL Final   02/07/2022 18 5 - 25 mg/dL Final   01/20/2022 18 5 - 25 mg/dL Final     Creatinine   Date Value Ref Range Status   03/02/2022 1 27 0 60 - 1 30 mg/dL Final     Comment:     Standardized to IDMS reference method   02/07/2022 1 24 0 60 - 1 30 mg/dL Final     Comment:     Standardized to IDMS reference method   01/20/2022 1 37 (H) 0 60 - 1 30 mg/dL Final     Comment:     Standardized to IDMS reference method     Calcium   Date Value Ref Range Status   03/02/2022 9 9 8 4 - 10 2 mg/dL Final   02/07/2022 9 6 8 4 - 10 2 mg/dL Final   01/20/2022 9 0 8 4 - 10 2 mg/dL Final     Albumin   Date Value Ref Range Status 03/02/2022 4 4 3 5 - 5 0 g/dL Final   02/07/2022 4 3 3 5 - 5 0 g/dL Final   01/20/2022 4 3 3 5 - 5 0 g/dL Final     Total Bilirubin   Date Value Ref Range Status   03/02/2022 0 38 0 20 - 1 00 mg/dL Final   02/07/2022 0 41 0 20 - 1 00 mg/dL Final   01/20/2022 0 34 0 20 - 1 00 mg/dL Final     Comment:     Use of this assay is not recommended for patients undergoing treatment with eltrombopag due to the potential for falsely elevated results  Alkaline Phosphatase   Date Value Ref Range Status   03/02/2022 55 34 - 104 U/L Final   02/07/2022 73 34 - 104 U/L Final   01/20/2022 59 34 - 104 U/L Final     AST   Date Value Ref Range Status   03/02/2022 24 13 - 39 U/L Final     Comment:     Specimen collection should occur prior to Sulfasalazine administration due to the potential for falsely depressed results  02/07/2022 22 13 - 39 U/L Final     Comment:     Specimen collection should occur prior to Sulfasalazine administration due to the potential for falsely depressed results  01/20/2022 20 13 - 39 U/L Final     Comment:     Specimen collection should occur prior to Sulfasalazine administration due to the potential for falsely depressed results  ALT   Date Value Ref Range Status   03/02/2022 24 7 - 52 U/L Final     Comment:     Specimen collection should occur prior to Sulfasalazine administration due to the potential for falsely depressed results  02/07/2022 22 7 - 52 U/L Final     Comment:     Specimen collection should occur prior to Sulfasalazine administration due to the potential for falsely depressed results  01/20/2022 20 7 - 52 U/L Final     Comment:     Specimen collection should occur prior to Sulfasalazine administration due to the potential for falsely depressed results         LD   Date Value Ref Range Status   03/02/2022 196 140 - 271 U/L Final   02/07/2022 195 140 - 271 U/L Final   01/20/2022 160 140 - 271 U/L Final     No results found for: TSH  No results found for: [id number]   No results found for: FREET4      RECENT IMAGING:  Procedure: MRI brain w wo contrast    Result Date: 2/11/2022  Narrative: MRI BRAIN WITH AND WITHOUT CONTRAST INDICATION: C43 59: Malignant melanoma of other part of trunk Z79 899: Other long term (current) drug therapy  COMPARISON:  None  TECHNIQUE: Sagittal T1, axial T2, axial FLAIR, axial T1, axial Keaton, axial diffusion  Sagittal, axial T1 postcontrast   Axial bravo postcontrast with coronal reconstructions  IV Contrast:  10 mL of Gadobutrol injection (SINGLE-DOSE)  IMAGE QUALITY:   Diagnostic  FINDINGS: BRAIN PARENCHYMA:  There is no discrete mass, mass effect or midline shift  There is no intracranial hemorrhage  Normal posterior fossa  Diffusion imaging is unremarkable  There are no white matter changes in the cerebral hemispheres  Postcontrast imaging of the brain demonstrates no abnormal enhancement  VENTRICLES:  Normal for the patient's age  SELLA AND PITUITARY GLAND:  Normal  ORBITS:  Normal  PARANASAL SINUSES:  Normal  VASCULATURE:  Evaluation of the major intracranial vasculature demonstrates appropriate flow voids  CALVARIUM AND SKULL BASE:  Normal  EXTRACRANIAL SOFT TISSUES:  Normal      Impression: Normal MRI of the brain  No evidence of metastatic disease  Workstation performed: [id number]            Assessment/Plan  Mr Merlin Patrick is a 48 yr male with stage IIIB melanoma on adjuvant treatment with encorafenib and binimetinib here for follow-up  1  Malignant melanoma of upper back ([address] )  2  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 [facility])  He is doing well and tolerating treatment  No cardiac toxicity based on echo in EKG  No side effects  Labs reviewed and okay to continue treatment  He will continue on treatment with encorafenib and binimetinib and will follow-up in 1 month  He has standing orders for blood work  He knows to call with any issues or concerns prior to his next visit      3  Primary hypertension  He has an appointment with his primary care doctor within a week  He will continue to address his hypertension as well as all age-appropriate healthcare issues  4  High risk medication use  He is on treatment with targeted therapy and we will continue to monitor for side effects and lab abnormalities  We will monitor labs monthly  to ensure safety of continuing on treatment  He knows to watch for signs and symptoms for side effects          Dana Ritchie MD, PhD

## 2022-03-08 NOTE — PROGRESS NOTES
[facility]  [address] [facility] [id number] [phone]     Date of Visit: 3/3/2022  Name: Marshall Galvin   YOB: 1975       Subjective    VISIT DIAGNOSIS:  Diagnoses and all orders for this visit:    Malignant melanoma of upper back [facility])    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 ([facility] [address] )    Primary hypertension    High risk medication use        Oncology History   Malignant melanoma of upper back ([facility] [address] )   1/4/2020 Biopsy    Left upper back shave biopsy:  - Malignant melanoma 2 3mm    Mitosis: 6  Ulceration: not identified     2/2/2021 Surgery    Wide excision with sentinel lymph node biopsy     2/2/2021 -  Cancer Staged    Staging form: Melanoma of the Skin, AJCC 8th Edition  - Pathologic stage from 2/2/2021: Stage IIIB (pT3a, pN2a, cM0) - Signed by Faheem Hernandez MD on 2/4/2022  Stage prefix: Initial diagnosis       4/8/2021 Genomic Testing    Decision DX: Class 1B     4/2021 -  Chemotherapy     Tafinalar 150 mg b i d  Mekinist 2 mg p o  Daily        Cancer Staging  Malignant melanoma of upper back [facility])  Staging form: Melanoma of the Skin, AJCC 8th Edition  - Pathologic stage from 2/2/2021: Stage IIIB (pT3a, pN2a, cM0) - Signed by Faheem Hernandez MD on 2/4/2022     [No treatment plan]     HISTORY OF PRESENT ILLNESS: Marshall Galvin is a 48 y o  male  who has Stage IIIB melanoma on adjuvant treatment with encorafenib and binimetinib here for follow up  He is doing well and tolerating treatment  Had his ECHO which demonstrates EF of 55%  BP improved with medication, which he is tolerating, and he follows up with primary care within a week and will address this issue  As rash, itching, diarrhea, abdominal pain, nausea, vomiting, chest pain, shortness of breast, and swelling  REVIEW OF SYSTEMS:  Review of Systems   Constitutional: Negative for appetite change, fatigue, fever and unexpected weight change     HENT:   Negative for lump/mass  Eyes: Negative for icterus  Respiratory: Negative for cough, shortness of breath and wheezing  Cardiovascular: Negative for leg swelling  Gastrointestinal: Negative for abdominal pain, constipation, diarrhea, nausea and vomiting  Genitourinary: Negative for difficulty urinating and hematuria  Musculoskeletal: Negative for arthralgias, gait problem and myalgias  Skin: Negative for itching and rash  No new, changing, or concerning lesions  Neurological: Negative for extremity weakness, gait problem, headaches, light-headedness and numbness  Hematological: Negative for adenopathy  MEDICATIONS:    Current Outpatient Medications:     amLODIPine (NORVASC) 5 mg tablet, Take 1 tablet (5 mg total) by mouth daily, Disp: 30 tablet, Rfl: 3    dabrafenib (TAFINLAR) 75 MG capsule, Take 2 capsules (150 mg total) by mouth 2 (two) times a day Take on an empty stomach, Disp: 120 capsule, Rfl: 2    lisinopril (ZESTRIL) 20 mg tablet, Take 1 tablet (20 mg total) by mouth daily, Disp: 30 tablet, Rfl: 3    Trametinib Dimethyl Sulfoxide 2 MG TABS, Take 1 tablet (2 mg total) by mouth daily, Disp: 30 tablet, Rfl: 2     ALLERGIES:  No Known Allergies     ACTIVE PROBLEMS:  Patient Active Problem List   Diagnosis    Malignant melanoma of upper back ([facility] [address] )    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 ([facility] [address] )    Primary hypertension          PAST MEDICAL HISTORY:   No past medical history on file       PAST SURGICAL HISTORY:  Past Surgical History:   Procedure Laterality Date    LYMPH NODE BIOPSY N/A 2/2/2021    Procedure: BIOPSY LYMPH NODE LEFT AXILLARY SENTINEL;  Surgeon: David Hooks MD;  Location: [facility];  Service: Surgical Oncology    NO PAST SURGERIES      SKIN LESION EXCISION Left 2/2/2021    Procedure: WIDE EXCISION MELANOMA SCAR UPPER BACK, intraoperative lymphatic mapping, sentinel lymph node biopsy; [address];  Surgeon: David Hooks MD;  Location: [facility];  Service: Surgical Oncology        SOCIAL HISTORY:  Social History     Socioeconomic History    Marital status: /Civil Union     Spouse name: Not on file    Number of children: Not on file    Years of education: Not on file    Highest education level: Not on file   Occupational History    Not on file   Tobacco Use    Smoking status: Never Smoker    Smokeless tobacco: Never Used   Vaping Use    Vaping Use: Never used   Substance and Sexual Activity    Alcohol use: Yes    Drug use: Never    Sexual activity: Not on file   Other Topics Concern    Not on file   Social History Narrative    Not on file     Social Determinants of Health     Financial Resource Strain: Not on file   Food Insecurity: Not on file   Transportation Needs: Not on file   Physical Activity: Not on file   Stress: Not on file   Social Connections: Not on file   Intimate Partner Violence: Not on file   Housing Stability: Not on file        FAMILY HISTORY:  Family History   Problem Relation Age of Onset    Basal cell carcinoma Father     No Known Problems Mother     Breast cancer Paternal Aunt 43    Breast cancer Paternal Grandmother [de-identified]          Objective    PHYSICAL EXAMINATION:   Blood pressure 132/90, pulse 101, temperature 97 6 °F (36 4 °C), resp  rate 16, height 5' 8" (1 727 m), weight 98 kg (216 lb), SpO2 99 %  ECOG Performance Status      Most Recent Value   ECOG Performance Status 0 - Fully active, able to carry on all pre-disease performance without restriction             Physical Exam  Constitutional:       General: He is not in acute distress  Appearance: Normal appearance  He is not toxic-appearing  HENT:      Mouth/Throat:      Mouth: Mucous membranes are moist       Pharynx: Oropharynx is clear  Eyes:      General: No scleral icterus  Cardiovascular:      Rate and Rhythm: Normal rate and regular rhythm  Pulses: Normal pulses  Heart sounds: No murmur heard  No friction rub  No gallop      Pulmonary:      Effort: Pulmonary effort is normal  No respiratory distress  Breath sounds: Normal breath sounds  No wheezing or rales  Chest:   Breasts:      Right: No axillary adenopathy or supraclavicular adenopathy  Left: No axillary adenopathy or supraclavicular adenopathy  Abdominal:      General: There is no distension  Palpations: There is no mass  Tenderness: There is no abdominal tenderness  There is no rebound  Musculoskeletal:         General: No swelling or tenderness  Right lower leg: No edema  Left lower leg: No edema  Lymphadenopathy:      Head:      Right side of head: No submandibular, preauricular or posterior auricular adenopathy  Left side of head: No submandibular, preauricular or posterior auricular adenopathy  Cervical: No cervical adenopathy  Right cervical: No superficial or posterior cervical adenopathy  Left cervical: No superficial or posterior cervical adenopathy  Upper Body:      Right upper body: No supraclavicular or axillary adenopathy  Left upper body: No supraclavicular or axillary adenopathy  Skin:     Findings: No rash  Comments: No concerning skin lesions  Neurological:      General: No focal deficit present  Mental Status: He is alert and oriented to person, place, and time  Psychiatric:         Mood and Affect: Mood normal          Behavior: Behavior normal          Thought Content: Thought content normal          Judgment: Judgment normal          I reviewed lab data in the chart      WBC   Date Value Ref Range Status   03/02/2022 4 88 4 31 - 10 16 Thousand/uL Final   02/07/2022 4 61 4 31 - 10 16 Thousand/uL Final   01/20/2022 4 86 4 31 - 10 16 Thousand/uL Final     Hemoglobin   Date Value Ref Range Status   03/02/2022 14 7 12 0 - 17 0 g/dL Final   02/07/2022 14 2 12 0 - 17 0 g/dL Final   01/20/2022 14 2 12 0 - 17 0 g/dL Final     Platelets   Date Value Ref Range Status   03/02/2022 155 149 - 390 Thousands/uL Final   02/07/2022 202 149 - 390 Thousands/uL Final   01/20/2022 168 149 - 390 Thousands/uL Final     MCV   Date Value Ref Range Status   03/02/2022 96 82 - 98 fL Final   02/07/2022 94 82 - 98 fL Final   01/20/2022 95 82 - 98 fL Final      Potassium   Date Value Ref Range Status   03/02/2022 3 8 3 5 - 5 3 mmol/L Final   02/07/2022 3 8 3 5 - 5 3 mmol/L Final   01/20/2022 3 5 3 5 - 5 3 mmol/L Final     Chloride   Date Value Ref Range Status   03/02/2022 102 96 - 108 mmol/L Final   02/07/2022 102 96 - 108 mmol/L Final   01/20/2022 106 96 - 108 mmol/L Final     CO2   Date Value Ref Range Status   03/02/2022 25 21 - 32 mmol/L Final   02/07/2022 25 21 - 32 mmol/L Final   01/20/2022 26 21 - 32 mmol/L Final     BUN   Date Value Ref Range Status   03/02/2022 25 5 - 25 mg/dL Final   02/07/2022 18 5 - 25 mg/dL Final   01/20/2022 18 5 - 25 mg/dL Final     Creatinine   Date Value Ref Range Status   03/02/2022 1 27 0 60 - 1 30 mg/dL Final     Comment:     Standardized to IDMS reference method   02/07/2022 1 24 0 60 - 1 30 mg/dL Final     Comment:     Standardized to IDMS reference method   01/20/2022 1 37 (H) 0 60 - 1 30 mg/dL Final     Comment:     Standardized to IDMS reference method     Calcium   Date Value Ref Range Status   03/02/2022 9 9 8 4 - 10 2 mg/dL Final   02/07/2022 9 6 8 4 - 10 2 mg/dL Final   01/20/2022 9 0 8 4 - 10 2 mg/dL Final     Albumin   Date Value Ref Range Status   03/02/2022 4 4 3 5 - 5 0 g/dL Final   02/07/2022 4 3 3 5 - 5 0 g/dL Final   01/20/2022 4 3 3 5 - 5 0 g/dL Final     Total Bilirubin   Date Value Ref Range Status   03/02/2022 0 38 0 20 - 1 00 mg/dL Final   02/07/2022 0 41 0 20 - 1 00 mg/dL Final   01/20/2022 0 34 0 20 - 1 00 mg/dL Final     Comment:     Use of this assay is not recommended for patients undergoing treatment with eltrombopag due to the potential for falsely elevated results       Alkaline Phosphatase   Date Value Ref Range Status   03/02/2022 55 34 - 104 U/L Final   02/07/2022 73 34 - 104 U/L Final   01/20/2022 59 34 - 104 U/L Final     AST   Date Value Ref Range Status   03/02/2022 24 13 - 39 U/L Final     Comment:     Specimen collection should occur prior to Sulfasalazine administration due to the potential for falsely depressed results  02/07/2022 22 13 - 39 U/L Final     Comment:     Specimen collection should occur prior to Sulfasalazine administration due to the potential for falsely depressed results  01/20/2022 20 13 - 39 U/L Final     Comment:     Specimen collection should occur prior to Sulfasalazine administration due to the potential for falsely depressed results  ALT   Date Value Ref Range Status   03/02/2022 24 7 - 52 U/L Final     Comment:     Specimen collection should occur prior to Sulfasalazine administration due to the potential for falsely depressed results  02/07/2022 22 7 - 52 U/L Final     Comment:     Specimen collection should occur prior to Sulfasalazine administration due to the potential for falsely depressed results  01/20/2022 20 7 - 52 U/L Final     Comment:     Specimen collection should occur prior to Sulfasalazine administration due to the potential for falsely depressed results  LD   Date Value Ref Range Status   03/02/2022 196 140 - 271 U/L Final   02/07/2022 195 140 - 271 U/L Final   01/20/2022 160 140 - 271 U/L Final     No results found for: TSH  No results found for: [id number]   No results found for: FREET4      RECENT IMAGING:  Procedure: MRI brain w wo contrast    Result Date: 2/11/2022  Narrative: MRI BRAIN WITH AND WITHOUT CONTRAST INDICATION: C43 59: Malignant melanoma of other part of trunk Z79 899: Other long term (current) drug therapy  COMPARISON:  None  TECHNIQUE: Sagittal T1, axial T2, axial FLAIR, axial T1, axial [place], axial diffusion  Sagittal, axial T1 postcontrast   Axial bravo postcontrast with coronal reconstructions  IV Contrast:  10 mL of Gadobutrol injection (SINGLE-DOSE)  IMAGE QUALITY:   Diagnostic  FINDINGS: BRAIN PARENCHYMA:  There is no discrete mass, mass effect or midline shift  There is no intracranial hemorrhage  Normal posterior fossa  Diffusion imaging is unremarkable  There are no white matter changes in the cerebral hemispheres  Postcontrast imaging of the brain demonstrates no abnormal enhancement  VENTRICLES:  Normal for the patient's age  SELLA AND PITUITARY GLAND:  Normal  ORBITS:  Normal  PARANASAL SINUSES:  Normal  VASCULATURE:  Evaluation of the major intracranial vasculature demonstrates appropriate flow voids  CALVARIUM AND SKULL BASE:  Normal  EXTRACRANIAL SOFT TISSUES:  Normal      Impression: Normal MRI of the brain  No evidence of metastatic disease  Workstation performed: [id number]            Assessment/Plan  Mr Leslie De Oliveira is a 48 yr male with stage IIIB melanoma on adjuvant treatment with encorafenib and binimetinib here for follow-up  1  Malignant melanoma of upper back ([facility] [address] )  2  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 [facility])  He is doing well and tolerating treatment  No cardiac toxicity based on echo in EKG  No side effects  Labs reviewed and okay to continue treatment  He will continue on treatment with encorafenib and binimetinib and will follow-up in 1 month  He has standing orders for blood work  He knows to call with any issues or concerns prior to his next visit  3  Primary hypertension  He has an appointment with his primary care doctor within a week  He will continue to address his hypertension as well as all age-appropriate healthcare issues  4  High risk medication use  He is on treatment with targeted therapy and we will continue to monitor for side effects and lab abnormalities  We will monitor labs monthly  to ensure safety of continuing on treatment  He knows to watch for signs and symptoms for side effects          Mariah Salguero MD, PhD

## 2022-03-11 ENCOUNTER — OFFICE VISIT (OUTPATIENT)
Dept: FAMILY MEDICINE CLINIC | Facility: CLINIC | Age: 47
End: 2022-03-11
Payer: COMMERCIAL

## 2022-03-11 VITALS
WEIGHT: 214.2 LBS | SYSTOLIC BLOOD PRESSURE: 122 MMHG | TEMPERATURE: 97.9 F | HEIGHT: 68 IN | HEART RATE: 100 BPM | DIASTOLIC BLOOD PRESSURE: 70 MMHG | BODY MASS INDEX: 32.46 KG/M2 | OXYGEN SATURATION: 99 %

## 2022-03-11 DIAGNOSIS — Z00.00 ANNUAL PHYSICAL EXAM: Primary | ICD-10-CM

## 2022-03-11 DIAGNOSIS — C43.59 MALIGNANT MELANOMA OF UPPER BACK (HCC): ICD-10-CM

## 2022-03-11 DIAGNOSIS — E78.00 PURE HYPERCHOLESTEROLEMIA: ICD-10-CM

## 2022-03-11 DIAGNOSIS — I10 HYPERTENSION, UNSPECIFIED TYPE: ICD-10-CM

## 2022-03-11 DIAGNOSIS — I10 PRIMARY HYPERTENSION: ICD-10-CM

## 2022-03-11 DIAGNOSIS — R73.03 PRE-DIABETES: ICD-10-CM

## 2022-03-11 DIAGNOSIS — Z79.899 HIGH RISK MEDICATIONS (NOT ANTICOAGULANTS) LONG-TERM USE: ICD-10-CM

## 2022-03-11 PROCEDURE — 1036F TOBACCO NON-USER: CPT | Performed by: FAMILY MEDICINE

## 2022-03-11 PROCEDURE — 3725F SCREEN DEPRESSION PERFORMED: CPT | Performed by: FAMILY MEDICINE

## 2022-03-11 PROCEDURE — 3008F BODY MASS INDEX DOCD: CPT | Performed by: FAMILY MEDICINE

## 2022-03-11 PROCEDURE — 99396 PREV VISIT EST AGE 40-64: CPT | Performed by: FAMILY MEDICINE

## 2022-03-11 PROCEDURE — 99214 OFFICE O/P EST MOD 30 MIN: CPT | Performed by: FAMILY MEDICINE

## 2022-03-11 PROCEDURE — 3074F SYST BP LT 130 MM HG: CPT | Performed by: FAMILY MEDICINE

## 2022-03-11 PROCEDURE — 3078F DIAST BP <80 MM HG: CPT | Performed by: FAMILY MEDICINE

## 2022-03-11 RX ORDER — LISINOPRIL 20 MG/1
20 TABLET ORAL DAILY
Qty: 30 TABLET | Refills: 3 | Status: SHIPPED | OUTPATIENT
Start: 2022-03-11 | End: 2022-04-19 | Stop reason: SDUPTHER

## 2022-03-11 NOTE — PROGRESS NOTES
Assessment/Plan:    No problem-specific Assessment & Plan notes found for this encounter  Diagnoses and all orders for this visit:    Annual physical exam    Primary hypertension  Comments:  Much improved on increased dose of lisinopril  Hold amlodipine for now, check BP at home  Orders:  -     lisinopril (ZESTRIL) 20 mg tablet; Take 1 tablet (20 mg total) by mouth daily  -     Comprehensive metabolic panel; Future  -     CBC and differential; Future    Malignant melanoma of upper back (HCC)  Comments: Following with H/O, reviewed CT C/A/P, pet scan and MR brain and echo    Pre-diabetes  Comments:   A1C 6 2  Orders:  -     Comprehensive metabolic panel; Future  -     HEMOGLOBIN A1C W/ EAG ESTIMATION; Future    Pure hypercholesterolemia  Comments:  Encourage diet and exercise  Repeat lipids in 1 year  Orders:  -     Lipid panel; Future          PHQ-2/9 Depression Screening    Little interest or pleasure in doing things: 0 - not at all  Feeling down, depressed, or hopeless: 0 - not at all  PHQ-2 Score: 0  PHQ-2 Interpretation: Negative depression screen            Subjective:      Patient ID: Leisa So is a 48 y o  male  Patient presents for annual physical and follow-up of his chronic conditions and lab review  His cbc and CMP are within normal limits, fasting blood sugar 111, A1c 6 2 his TSH is normal, total cholesterol 218, triglycerides 103, HDL 44,   The patient recently has several scans regarding his history of malignant melanoma which is being followed by Oncology, he is taking oral chemotherapeutic agents x1 month and he will be done in April  MRI of the brain is normal, CT of the chest abdomen and pelvis does not show any evidence of metastatic disease  PET scan does not show definitive overt metastatic disease, he will follow up on the Hematology-Oncology regarding the studies         The following portions of the patient's history were reviewed and updated as appropriate: allergies, current medications, past family history, past medical history, past social history, past surgical history and problem list     Review of Systems   Constitutional: Negative  Negative for chills, fatigue and fever  HENT: Negative  Negative for hearing loss  Eyes: Negative  Negative for visual disturbance  Respiratory: Negative for shortness of breath and wheezing  Cardiovascular: Negative for chest pain and palpitations  Gastrointestinal: Negative for abdominal pain, blood in stool, constipation, diarrhea, nausea and vomiting  Endocrine: Negative  Genitourinary: Negative for difficulty urinating and dysuria  Musculoskeletal: Negative for arthralgias and myalgias  Skin: Negative  Allergic/Immunologic: Negative  Neurological: Negative for seizures and syncope  Hematological: Negative for adenopathy  Psychiatric/Behavioral: Negative  Objective:    /70 (BP Location: Left arm, Patient Position: Sitting)   Pulse 100   Temp 97 9 °F (36 6 °C) (Tympanic)   Ht 5' 8" (1 727 m)   Wt 97 2 kg (214 lb 3 2 oz)   SpO2 99%   BMI 32 57 kg/m²      Physical Exam  Vitals and nursing note reviewed  Constitutional:       General: He is not in acute distress  Appearance: Normal appearance  He is not ill-appearing or diaphoretic  HENT:      Head: Normocephalic and atraumatic  Eyes:      Conjunctiva/sclera: Conjunctivae normal    Cardiovascular:      Rate and Rhythm: Normal rate and regular rhythm  Heart sounds: Normal heart sounds  No murmur heard  Pulmonary:      Effort: Pulmonary effort is normal  No respiratory distress  Breath sounds: Normal breath sounds  No wheezing, rhonchi or rales  Abdominal:      General: There is no distension  Palpations: Abdomen is soft  There is no mass  Tenderness: There is no abdominal tenderness  There is no guarding or rebound  Musculoskeletal:      Cervical back: Normal range of motion and neck supple  No rigidity  Right lower leg: No edema  Left lower leg: No edema  Lymphadenopathy:      Cervical: No cervical adenopathy  Neurological:      General: No focal deficit present  Mental Status: He is alert and oriented to person, place, and time  Psychiatric:         Mood and Affect: Mood normal          Behavior: Behavior normal          Thought Content:  Thought content normal          Judgment: Judgment normal

## 2022-03-11 NOTE — PATIENT INSTRUCTIONS
F/U 6m for BP re-check  Wellness Visit for Adults   AMBULATORY CARE:   A wellness visit  is when you see your healthcare provider to get screened for health problems  Your healthcare provider will also give you advice on how to stay healthy  Write down your questions so you remember to ask them  Ask your healthcare provider how often you should have a wellness visit  What happens at a wellness visit:  Your healthcare provider will ask about your health, and your family history of health problems  This includes high blood pressure, heart disease, and cancer  He or she will ask if you have symptoms that concern you, if you smoke, and about your mood  You may also be asked about your intake of medicines, supplements, food, and alcohol  Any of the following may be done:  · Your weight  will be checked  Your height may also be checked so your body mass index (BMI) can be calculated  Your BMI shows if you are at a healthy weight  · Your blood pressure  and heart rate will be checked  Your temperature may also be checked  · Blood and urine tests  may be done  Blood tests may be done to check your cholesterol levels  Abnormal cholesterol levels increase your risk for heart disease and stroke  You may also need a blood or urine test to check for diabetes if you are at increased risk  Urine tests may be done to look for signs of an infection or kidney disease  · A physical exam  includes checking your heartbeat and lungs with a stethoscope  Your healthcare provider may also check your skin to look for sun damage  · Screening tests  may be recommended  A screening test is done to check for diseases that may not cause symptoms  The screening tests you may need depend on your age, gender, family history, and lifestyle habits  For example, colorectal screening may be recommended if you are 48years old or older  Screening tests you need if you are a woman:   · A Pap smear  is used to screen for cervical cancer  Pap smears are usually done every 3 to 5 years depending on your age  You may need them more often if you have had abnormal Pap smear test results in the past  Ask your healthcare provider how often you should have a Pap smear  · A mammogram  is an x-ray of your breasts to screen for breast cancer  Experts recommend mammograms every 2 years starting at age 48 years  You may need a mammogram at age 52 years or younger if you have an increased risk for breast cancer  Talk to your healthcare provider about when you should start having mammograms and how often you need them  Vaccines you may need:   · Get an influenza vaccine  every year  The influenza vaccine protects you from the flu  Several types of viruses cause the flu  The viruses change over time, so new vaccines are made each year  · Get a tetanus-diphtheria (Td) booster vaccine  every 10 years  This vaccine protects you against tetanus and diphtheria  Tetanus is a severe infection that may cause painful muscle spasms and lockjaw  Diphtheria is a severe bacterial infection that causes a thick covering in the back of your mouth and throat  · Get a human papillomavirus (HPV) vaccine  if you are female and aged 23 to 32 or male 23 to 24 and never received it  This vaccine protects you from HPV infection  HPV is the most common infection spread by sexual contact  HPV may also cause vaginal, penile, and anal cancers  · Get a pneumococcal vaccine  if you are aged 72 years or older  The pneumococcal vaccine is an injection given to protect you from pneumococcal disease  Pneumococcal disease is an infection caused by pneumococcal bacteria  The infection may cause pneumonia, meningitis, or an ear infection  · Get a shingles vaccine  if you are 60 or older, even if you have had shingles before  The shingles vaccine is an injection to protect you from the varicella-zoster virus  This is the same virus that causes chickenpox   Shingles is a painful rash that develops in people who had chickenpox or have been exposed to the virus  How to eat healthy:  My Plate is a model for planning healthy meals  It shows the types and amounts of foods that should go on your plate  Fruits and vegetables make up about half of your plate, and grains and protein make up the other half  A serving of dairy is included on the side of your plate  The amount of calories and serving sizes you need depends on your age, gender, weight, and height  Examples of healthy foods are listed below:  · Eat a variety of vegetables  such as dark green, red, and orange vegetables  You can also include canned vegetables low in sodium (salt) and frozen vegetables without added butter or sauces  · Eat a variety of fresh fruits , canned fruit in 100% juice, frozen fruit, and dried fruit  · Include whole grains  At least half of the grains you eat should be whole grains  Examples include whole-wheat bread, wheat pasta, brown rice, and whole-grain cereals such as oatmeal     · Eat a variety of protein foods such as seafood (fish and shellfish), lean meat, and poultry without skin (turkey and chicken)  Examples of lean meats include pork leg, shoulder, or tenderloin, and beef round, sirloin, tenderloin, and extra lean ground beef  Other protein foods include eggs and egg substitutes, beans, peas, soy products, nuts, and seeds  · Choose low-fat dairy products such as skim or 1% milk or low-fat yogurt, cheese, and cottage cheese  · Limit unhealthy fats  such as butter, hard margarine, and shortening  Exercise:  Exercise at least 30 minutes per day on most days of the week  Some examples of exercise include walking, biking, dancing, and swimming  You can also fit in more physical activity by taking the stairs instead of the elevator or parking farther away from stores  Include muscle strengthening activities 2 days each week  Regular exercise provides many health benefits   It helps you manage your weight, and decreases your risk for type 2 diabetes, heart disease, stroke, and high blood pressure  Exercise can also help improve your mood  Ask your healthcare provider about the best exercise plan for you  General health and safety guidelines:   · Do not smoke  Nicotine and other chemicals in cigarettes and cigars can cause lung damage  Ask your healthcare provider for information if you currently smoke and need help to quit  E-cigarettes or smokeless tobacco still contain nicotine  Talk to your healthcare provider before you use these products  · Limit alcohol  A drink of alcohol is 12 ounces of beer, 5 ounces of wine, or 1½ ounces of liquor  · Lose weight, if needed  Being overweight increases your risk of certain health conditions  These include heart disease, high blood pressure, type 2 diabetes, and certain types of cancer  · Protect your skin  Do not sunbathe or use tanning beds  Use sunscreen with a SPF 15 or higher  Apply sunscreen at least 15 minutes before you go outside  Reapply sunscreen every 2 hours  Wear protective clothing, hats, and sunglasses when you are outside  · Drive safely  Always wear your seatbelt  Make sure everyone in your car wears a seatbelt  A seatbelt can save your life if you are in an accident  Do not use your cell phone when you are driving  This could distract you and cause an accident  Pull over if you need to make a call or send a text message  · Practice safe sex  Use latex condoms if are sexually active and have more than one partner  Your healthcare provider may recommend screening tests for sexually transmitted infections (STIs)  · Wear helmets, lifejackets, and protective gear  Always wear a helmet when you ride a bike or motorcycle, go skiing, or play sports that could cause a head injury  Wear protective equipment when you play sports  Wear a lifejacket when you are on a boat or doing water sports      © Copyright [employer] 2022 Information is for [employer] use only and may not be sold, redistributed or otherwise used for commercial purposes  All illustrations and images included in CareNotes® are the copyrighted property of A D A M , Inc  or Dimitry Phillips  The above information is an  only  It is not intended as medical advice for individual conditions or treatments  Talk to your doctor, nurse or pharmacist before following any medical regimen to see if it is safe and effective for you

## 2022-03-11 NOTE — PROGRESS NOTES
ADULT ANNUAL [facility]    NAME: Nancy Hurt  AGE: 48 y o  SEX: male  : 1975     DATE: 3/11/2022     Assessment and Plan:     Problem List Items Addressed This Visit        Cardiovascular and Mediastinum    Primary hypertension    Relevant Medications    lisinopril (ZESTRIL) 20 mg tablet    Other Relevant Orders    Comprehensive metabolic panel    CBC and differential       Other    Malignant melanoma of upper back ([address] )      Other Visit Diagnoses     Annual physical exam    -  Primary    Pre-diabetes         A1C 6 2    Relevant Orders    Comprehensive metabolic panel    HEMOGLOBIN A1C W/ EAG ESTIMATION    Pure hypercholesterolemia        Encourage diet and exercise  Repeat lipids in 1 year    Relevant Orders    Lipid panel          Immunizations and preventive care screenings were discussed with patient today  Appropriate education was printed on patient's after visit summary  Counseling:  · Alcohol/drug use: discussed moderation in alcohol intake, the recommendations for healthy alcohol use, and avoidance of illicit drug use  Return in about 6 months (around 2022) for Next scheduled follow up  Chief Complaint:     Chief Complaint   Patient presents with    Physical Exam     lab review       History of Present Illness:     Adult Annual Physical   Patient here for a comprehensive physical exam  The patient reports no problems  Diet and Physical Activity  · Diet/Nutrition: well balanced diet and consuming 3-5 servings of fruits/vegetables daily  · Exercise: strength training exercises and 3-4 times a week on average        Depression Screening  PHQ-2/9 Depression Screening    Little interest or pleasure in doing things: 0 - not at all  Feeling down, depressed, or hopeless: 0 - not at all  PHQ-2 Score: 0  PHQ-2 Interpretation: Negative depression screen       General Health  · Sleep: gets 7-8 hours of sleep on average  · Hearing: normal - bilateral   · Vision: no vision problems, goes for regular eye exams, most recent eye exam <1 year ago and wears glasses  · Dental: no dental visits for >1 year, brushes teeth twice daily and flosses teeth occasionally   Health  · Symptoms include: none     Review of Systems:     Review of Systems   Constitutional: Negative  Negative for chills, fatigue and fever  HENT: Negative  Eyes: Negative  Negative for visual disturbance  Respiratory: Negative for cough, chest tightness, shortness of breath and wheezing  Cardiovascular: Negative for chest pain and palpitations  Gastrointestinal: Negative for abdominal pain, blood in stool, constipation, diarrhea, nausea and vomiting  Endocrine: Negative  Genitourinary: Negative for difficulty urinating, dysuria, frequency, hematuria and urgency  Musculoskeletal: Negative for arthralgias and myalgias  Skin: Negative  Allergic/Immunologic: Negative  Neurological: Negative for dizziness, seizures, syncope and headaches  Hematological: Negative for adenopathy  Psychiatric/Behavioral: Negative  Negative for dysphoric mood  The patient is not nervous/anxious  Past Medical History:     History reviewed  No pertinent past medical history     Past Surgical History:     Past Surgical History:   Procedure Laterality Date    LYMPH NODE BIOPSY N/A 2/2/2021    Procedure: BIOPSY LYMPH NODE LEFT AXILLARY SENTINEL;  Surgeon: Lynn Martell MD;  Location: [facility];  Service: Surgical Oncology    NO PAST SURGERIES      SKIN LESION EXCISION Left 2/2/2021    Procedure: WIDE EXCISION MELANOMA SCAR UPPER BACK, intraoperative lymphatic mapping, sentinel lymph node biopsy; [address];  Surgeon: Lynn Martell MD;  Location: [facility];  Service: Surgical Oncology      Family History:     Family History   Problem Relation Age of Onset    Basal cell carcinoma Father     No Known Problems Mother    [facility] Breast cancer Paternal Aunt 43    Breast cancer Paternal Grandmother [de-identified]      Social History:     Social History     Socioeconomic History    Marital status: /Civil Union     Spouse name: None    Number of children: None    Years of education: None    Highest education level: None   Occupational History    None   Tobacco Use    Smoking status: Never Smoker    Smokeless tobacco: Never Used   Vaping Use    Vaping Use: Never used   Substance and Sexual Activity    Alcohol use: Yes    Drug use: Never    Sexual activity: None   Other Topics Concern    None   Social History Narrative    None     Social Determinants of Health     Financial Resource Strain: Not on file   Food Insecurity: Not on file   Transportation Needs: Not on file   Physical Activity: Not on file   Stress: Not on file   Social Connections: Not on file   Intimate Partner Violence: Not on file   Housing Stability: Not on file      Current Medications:     Current Outpatient Medications   Medication Sig Dispense Refill    amLODIPine (NORVASC) 5 mg tablet Take 1 tablet (5 mg total) by mouth daily 30 tablet 3    dabrafenib (TAFINLAR) 75 MG capsule Take 2 capsules (150 mg total) by mouth 2 (two) times a day Take on an empty stomach 120 capsule 2    lisinopril (ZESTRIL) 20 mg tablet Take 1 tablet (20 mg total) by mouth daily 30 tablet 3    Trametinib Dimethyl Sulfoxide 2 MG TABS Take 1 tablet (2 mg total) by mouth daily 30 tablet 2     No current facility-administered medications for this visit  Allergies:     No Known Allergies   Physical Exam:     /70 (BP Location: Left arm, Patient Position: Sitting)   Pulse 100   Temp 97 9 °F (36 6 °C) (Tympanic)   Ht 5' 8" (1 727 m)   Wt 97 2 kg (214 lb 3 2 oz)   SpO2 99%   BMI 32 57 kg/m²     Physical Exam  Vitals and nursing note reviewed  Constitutional:       General: He is not in acute distress  Appearance: Normal appearance  He is well-developed   He is not ill-appearing, toxic-appearing or diaphoretic  HENT:      Head: Normocephalic and atraumatic  Right Ear: Tympanic membrane, ear canal and external ear normal       Left Ear: Tympanic membrane, ear canal and external ear normal       Mouth/Throat:      Mouth: Mucous membranes are moist       Pharynx: Oropharynx is clear  Eyes:      Conjunctiva/sclera: Conjunctivae normal    Neck:      Vascular: No carotid bruit  Cardiovascular:      Rate and Rhythm: Normal rate and regular rhythm  Pulses: Normal pulses  Heart sounds: Normal heart sounds  No murmur heard  Pulmonary:      Effort: Pulmonary effort is normal  No respiratory distress  Breath sounds: Normal breath sounds  Abdominal:      General: Abdomen is flat  There is no distension  Palpations: Abdomen is soft  There is no mass  Tenderness: There is no abdominal tenderness  Musculoskeletal:      Cervical back: Neck supple  No muscular tenderness  Right lower leg: No edema  Left lower leg: No edema  Lymphadenopathy:      Cervical: No cervical adenopathy  Skin:     General: Skin is warm and dry  Neurological:      General: No focal deficit present  Mental Status: He is alert and oriented to person, place, and time  Sensory: No sensory deficit  Psychiatric:         Mood and Affect: Mood normal          Behavior: Behavior normal          Thought Content:  Thought content normal          Judgment: Judgment normal           Greg Leonard DO  [address] [facility]

## 2022-03-25 NOTE — TELEPHONE ENCOUNTER
Norma and spoke with Ramone Torres, patient   She states patient has as active script on file for [facility] but they are unable to send any refills until they receive a new prior authorization  I explained Madeleine Orantes has already submitted the application  Ramone Torres states his case  in 2021 and no refills can be sent until they receive a new prior authorization  Ramone Torres is requesting prior auth be faxed to [phone]

## 2022-03-30 NOTE — TELEPHONE ENCOUNTER
Called and spoke with patient  He confirmed he has both the [place] and [facility]  He will start taking today

## 2022-04-08 ENCOUNTER — TELEPHONE (OUTPATIENT)
Dept: HEMATOLOGY ONCOLOGY | Facility: CLINIC | Age: 47
End: 2022-04-08

## 2022-04-08 NOTE — TELEPHONE ENCOUNTER
Appointment Cancellation Or Reschedule     Person calling in Patient    Provider Dr Camilo Bellamy   Office Visit Date and Time 4/15 9AM   Office Visit Location [place]   Did patient want to reschedule their office appointment? If so, when was it scheduled to? Yes 5/6 9:45AM   Is this patient calling to reschedule an infusion appointment? No   When is their next infusion appointment? No   Is this patient a Chemo patient? Immunotherapy   Reason for Cancellation or Reschedule Work schedule     If the patient is a treatment patient, please route this to the office nurse  If the patient is not on treatment, please route to the office MA

## 2022-04-19 DIAGNOSIS — I10 PRIMARY HYPERTENSION: ICD-10-CM

## 2022-04-19 RX ORDER — LISINOPRIL 20 MG/1
20 TABLET ORAL DAILY
Qty: 30 TABLET | Refills: 0 | Status: SHIPPED | OUTPATIENT
Start: 2022-04-19 | End: 2022-05-17 | Stop reason: SDUPTHER

## 2022-04-19 RX ORDER — LISINOPRIL 20 MG/1
20 TABLET ORAL DAILY
Qty: 30 TABLET | Refills: 0 | Status: SHIPPED | OUTPATIENT
Start: 2022-04-19 | End: 2022-04-19 | Stop reason: SDUPTHER

## 2022-04-20 ENCOUNTER — TELEPHONE (OUTPATIENT)
Dept: HEMATOLOGY ONCOLOGY | Facility: CLINIC | Age: 47
End: 2022-04-20

## 2022-04-20 NOTE — TELEPHONE ENCOUNTER
Appointment Cancellation Or Reschedule     Person calling in Patient    Provider Dr Phoenix Holt   Office Visit Date and Time 4/27/22 @ 9:20am   Office Visit Location [place]   Did patient want to reschedule their office appointment? If so, when was it scheduled to? Yes 5/25/22 @ 8:40am   Is this patient calling to reschedule an infusion appointment? no   When is their next infusion appointment? na   Is this patient a Chemo patient? no   Reason for Cancellation or Reschedule unable to keep appointment     If the patient is a treatment patient, please route this to the office nurse  If the patient is not on treatment, please route to the office MA

## 2022-05-06 ENCOUNTER — OFFICE VISIT (OUTPATIENT)
Dept: SURGICAL ONCOLOGY | Facility: CLINIC | Age: 47
End: 2022-05-06
Payer: COMMERCIAL

## 2022-05-06 VITALS
HEIGHT: 68 IN | DIASTOLIC BLOOD PRESSURE: 84 MMHG | HEART RATE: 97 BPM | BODY MASS INDEX: 32.49 KG/M2 | RESPIRATION RATE: 18 BRPM | TEMPERATURE: 97.7 F | OXYGEN SATURATION: 98 % | SYSTOLIC BLOOD PRESSURE: 134 MMHG | WEIGHT: 214.4 LBS

## 2022-05-06 DIAGNOSIS — C43.59 MALIGNANT MELANOMA OF UPPER BACK (HCC): Primary | ICD-10-CM

## 2022-05-06 DIAGNOSIS — C77.9 MELANOMA METASTATIC TO LYMPH NODE (HCC): ICD-10-CM

## 2022-05-06 DIAGNOSIS — C43.9 MELANOMA METASTATIC TO LYMPH NODE (HCC): ICD-10-CM

## 2022-05-06 PROCEDURE — 99214 OFFICE O/P EST MOD 30 MIN: CPT | Performed by: SURGERY

## 2022-05-06 NOTE — LETTER
May 6, 2022     Anna Hodgson DO  [address] [place] [address]    Patient: Ani Middleton   YOB: 1975   Date of Visit: 5/6/2022       Dear Dr Yasmani Zavaleta:    Thank you for referring Ilene Perez to me for evaluation  Below are my notes for this consultation  If you have questions, please do not hesitate to call me  I look forward to following your patient along with you  Sincerely,        Royal Callie MD        CC: Brinda Mckinnon MD Royal Presume, MD  5/6/2022  9:45 AM  Sign when Signing Visit     Surgical Oncology Follow Up       [address] [address]    Ani Middleton  1975  [id number]  [facility]  [facility] SURGICAL ON[facility] [address] [address]    Chief Complaint   Patient presents with    Follow-up       Assessment/Plan:    No problem-specific Assessment & Plan notes found for this encounter  Diagnoses and all orders for this visit:    Malignant melanoma of upper back [facility])    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 [facility])        Advance Care Planning/Advance Directives:  Discussed disease status, cancer treatment plans and/or cancer treatment goals with the patient  Oncology History   Malignant melanoma of upper back ([facility] [address] )   1/4/2020 Biopsy    Left upper back shave biopsy:  - Malignant melanoma 2 3mm    Mitosis: 6  Ulceration: not identified     2/2/2021 Surgery    Wide excision with sentinel lymph node biopsy  Lymph Node, [place], number 1, biopsy:  - Metastatic melanoma in one lymph node (1/1, larger focus approximately 1 3 mm; subcapsular and intraparenchyma), no extranodal extension      Skin, left back, excision:  - Residual melanoma, 1 3 mm; prior biopsy site reaction   - Inked margins with no tumor seen          Lymph Node, [place], sentinel node #2, biopsy:  - Metastatic melanoma in one of ten lymph nodes (1/10, single and small nests of cells, subcapsular, larger focus 0 11 mm); no extranodal extension  2/2/2021 -  Cancer Staged    Staging form: Melanoma of the Skin, AJCC 8th Edition  - Pathologic stage from 2/2/2021: Stage IIIB (pT3a, pN2a, cM0) - Signed by Marykay Castleman, MD on 2/4/2022  Stage prefix: Initial diagnosis       4/8/2021 Genomic Testing    Decision DX: Class 1B     4/2021 -  Chemotherapy     Tafinalar 150 mg b i d  Mekinist 2 mg p o  Daily         History of Present Illness:  Patient with history of stage III melanoma here for surveillance visit   -Interval History:  He completed to fill our last week  He has had recent scans done as ordered by Dr Lobo Host for surveillance  Review of Systems:  Review of Systems   Constitutional: Negative  HENT: Negative  Eyes: Negative  Respiratory: Negative  Cardiovascular: Negative  Gastrointestinal: Negative  Endocrine: Negative  Genitourinary: Negative  Musculoskeletal: Negative  Skin: Negative  Allergic/Immunologic: Negative  Neurological: Negative  Hematological: Negative  Psychiatric/Behavioral: Negative  All other systems reviewed and are negative  Patient Active Problem List   Diagnosis    Malignant melanoma of upper back ([facility] [address] )    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 ([facility] [address] )    Primary hypertension     No past medical history on file    Past Surgical History:   Procedure Laterality Date    LYMPH NODE BIOPSY N/A 2/2/2021    Procedure: BIOPSY LYMPH NODE LEFT AXILLARY SENTINEL;  Surgeon: Michael Conklin MD;  [facility];  Service: Surgical Oncology    NO PAST SURGERIES      SKIN LESION EXCISION Left 2/2/2021    Procedure: WIDE EXCISION MELANOMA SCAR UPPER BACK, intraoperative lymphatic mapping, sentinel lymph node biopsy; [address];  Surgeon: Michael Conklin MD;  [facility];  Service: Surgical Oncology     Family History   Problem Relation Age of Onset    Basal cell carcinoma Father     No Known Problems Mother     Breast cancer Paternal Aunt 43    Breast cancer Paternal Grandmother [de-identified]     Social History     Socioeconomic History    Marital status: /Civil Union     Spouse name: Not on file    Number of children: Not on file    Years of education: Not on file    Highest education level: Not on file   Occupational History    Not on file   Tobacco Use    Smoking status: Never Smoker    Smokeless tobacco: Never Used   Vaping Use    Vaping Use: Never used   Substance and Sexual Activity    Alcohol use: Yes    Drug use: Never    Sexual activity: Not on file   Other Topics Concern    Not on file   Social History Narrative    Not on file     Social Determinants of Health     Financial Resource Strain: Not on file   Food Insecurity: Not on file   Transportation Needs: Not on file   Physical Activity: Not on file   Stress: Not on file   Social Connections: Not on file   Intimate Partner Violence: Not on file   Housing Stability: Not on file       Current Outpatient Medications:     amLODIPine (NORVASC) 5 mg tablet, Take 1 tablet (5 mg total) by mouth daily, Disp: 30 tablet, Rfl: 3    dabrafenib (TAFINLAR) 75 MG capsule, Take 2 capsules (150 mg total) by mouth 2 (two) times a day Take on an empty stomach, Disp: 120 capsule, Rfl: 2    lisinopril (ZESTRIL) 20 mg tablet, Take 1 tablet (20 mg total) by mouth daily, Disp: 30 tablet, Rfl: 0    Trametinib Dimethyl Sulfoxide 2 MG TABS, Take 1 tablet (2 mg total) by mouth daily, Disp: 30 tablet, Rfl: 2  No Known Allergies  Vitals:    05/06/22 0929   BP: 134/84   Pulse: 97   Resp: 18   Temp: 97 7 °F (36 5 °C)   SpO2: 98%       Physical Exam  Vitals reviewed  Constitutional:       Appearance: Normal appearance  HENT:      Head: Normocephalic and atraumatic  Right Ear: External ear normal       Left Ear: External ear normal       Nose: Nose normal    Eyes:      Extraocular Movements: Extraocular movements intact  Pupils: Pupils are equal, round, and reactive to light  Cardiovascular:      Rate and Rhythm: Normal rate and regular rhythm  Pulses: Normal pulses  Heart sounds: Normal heart sounds  Pulmonary:      Effort: Pulmonary effort is normal       Breath sounds: Normal breath sounds  Abdominal:      General: Abdomen is flat  Palpations: Abdomen is soft  Musculoskeletal:         General: Normal range of motion  Cervical back: Normal range of motion and neck supple  Skin:     General: Skin is warm and dry  Comments: Back excision site without evidence of recurrence visible or palpable  Negative for cervical or axillary adenopathy  Neurological:      General: No focal deficit present  Mental Status: He is alert and oriented to person, place, and time  Psychiatric:         Mood and Affect: Mood normal          Behavior: Behavior normal          Thought Content: Thought content normal          Judgment: Judgment normal            Results:  Labs:  Component Ref Range & Units 2/7/22  7:54 AM 1/20/22  6:20 AM 10/19/21  7:03 AM    - 271 U/L 195            Imaging  CT CHEST, ABDOMEN AND PELVIS WITH IV CONTRAST     INDICATION:   C43 9: Malignant melanoma of skin, unspecified  C77 9: Secondary and unspecified malignant neoplasm of lymph node, unspecified      COMPARISON:  CT chest abdomen pelvis 10/19/2021  PET CT 11/5/2021      TECHNIQUE: CT examination of the chest, abdomen and pelvis was performed  Axial, sagittal, and coronal 2D reformatted images were created from the source data and submitted for interpretation      Radiation dose length product (DLP) for this visit:  994 04 mGy-cm     This examination, like all CT scans performed in the [facility], was performed utilizing techniques to minimize radiation dose exposure, including the use of iterative   reconstruction and automated exposure control      IV Contrast:  100 mL of iohexol (OMNIPAQUE)  Enteric Contrast: Enteric contrast was administered      FINDINGS:     CHEST     LUNGS:  Previously seen peripheral groundglass opacities are much improved  A few residual mild opacities are present and could represent postinfectious scarring      No acute pulmonary findings  No endotracheal or endobronchial lesion      PLEURA:  Unremarkable      HEART/GREAT VESSELS: Heart is unremarkable for patient's age  No thoracic aortic aneurysm      MEDIASTINUM AND MAGALYS:  Unremarkable      CHEST WALL AND LOWER NECK:   Left posterior para midline chest wall surgical scar decreased in thickness when compared with the prior CT      ABDOMEN     LIVER/BILIARY TREE:  Unchanged well-circumscribed 8 mm segment 5 hypodensity, likely a cyst  #2/55     GALLBLADDER:  No calcified gallstones  No pericholecystic inflammatory change      SPLEEN:  Unremarkable      PANCREAS:  Unremarkable      ADRENAL GLANDS:  Unremarkable      KIDNEYS/URETERS:  Unchanged subcentimeter left renal cyst  No hydronephrosis      STOMACH AND BOWEL:  There is colonic diverticulosis without evidence of acute diverticulitis      APPENDIX:  No findings to suggest appendicitis      ABDOMINOPELVIC CAVITY:  No ascites  No pneumoperitoneum  No lymphadenopathy      Previously seen fat stranding/soft tissue infiltration adjacent to the pancreas has resolved      VESSELS:  Unremarkable for patient's age      PELVIS     REPRODUCTIVE ORGANS:  Unremarkable for patient's age      URINARY BLADDER:  Unremarkable      ABDOMINAL WALL/INGUINAL REGIONS:  Unremarkable      OSSEOUS STRUCTURES:  No acute fracture or destructive osseous lesion      IMPRESSION:     Previously seen fat stranding/soft tissue infiltration adjacent to the pancreas has resolved      Pulmonary groundglass opacity seen on the prior study are much improved   A few residual mild opacities are present and could represent postinfectious scarring      No signs of metastatic disease in the chest, abdomen or pelvis in this patient with a history of lymphoma        Workstation performed: [id number]      I reviewed the above laboratory and imaging data  Discussion/Summary: Stage 3b melanoma, presently JEFF  Continue close surveillance, with scans in 4 months  F/u with Dr Dariel Klinefelter as well

## 2022-05-06 NOTE — PROGRESS NOTES
Surgical Oncology Follow Up       [facility] SURGICAL ONCOLOGY [facility] [address] [address]    [place]  1975  [id number]  [facility] SURGICAL ONCOLOGY [facility] [address] [address]    Chief Complaint   Patient presents with    Follow-up       Assessment/Plan:    No problem-specific Assessment & Plan notes found for this encounter  Diagnoses and all orders for this visit:    Malignant melanoma of upper back [facility])    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 [facility])        Advance Care Planning/Advance Directives:  Discussed disease status, cancer treatment plans and/or cancer treatment goals with the patient  Oncology History   Malignant melanoma of upper back ([address] )   1/4/2020 Biopsy    Left upper back shave biopsy:  - Malignant melanoma 2 3mm    Mitosis: 6  Ulceration: not identified     2/2/2021 Surgery    Wide excision with sentinel lymph node biopsy  Lymph Node, [place], number 1, biopsy:  - Metastatic melanoma in one lymph node (1/1, larger focus approximately 1 3 mm; subcapsular and intraparenchyma), no extranodal extension      Skin, left back, excision:  - Residual melanoma, 1 3 mm; prior biopsy site reaction   - Inked margins with no tumor seen  Lymph Node, [place], sentinel node #2, biopsy:  - Metastatic melanoma in one of ten lymph nodes (1/10, single and small nests of cells, subcapsular, larger focus 0 11 mm); no extranodal extension  2/2/2021 -  Cancer Staged    Staging form: Melanoma of the Skin, AJCC 8th Edition  - Pathologic stage from 2/2/2021: Stage IIIB (pT3a, pN2a, cM0) - Signed by Mallory Gil MD on 2/4/2022  Stage prefix: Initial diagnosis       4/8/2021 Genomic Testing    Decision DX: Class 1B     4/2021 -  Chemotherapy     Tafinalar 150 mg b i d  Mekinist 2 mg p o   Daily         History of Present Illness:  Patient with history of stage III melanoma here for surveillance visit   -Interval History:  He completed to fill our last week  He has had recent scans done as ordered by Dr Baron Jones for surveillance  Review of Systems:  Review of Systems   Constitutional: Negative  HENT: Negative  Eyes: Negative  Respiratory: Negative  Cardiovascular: Negative  Gastrointestinal: Negative  Endocrine: Negative  Genitourinary: Negative  Musculoskeletal: Negative  Skin: Negative  Allergic/Immunologic: Negative  Neurological: Negative  Hematological: Negative  Psychiatric/Behavioral: Negative  All other systems reviewed and are negative  Patient Active Problem List   Diagnosis    Malignant melanoma of upper back ([facility] [address] )    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 ([facility] [address] )    Primary hypertension     No past medical history on file  Past Surgical History:   Procedure Laterality Date    LYMPH NODE BIOPSY N/A 2/2/2021    Procedure: BIOPSY LYMPH NODE LEFT AXILLARY SENTINEL;  Surgeon: Royal Callie MD;  [facility];  Service: Surgical Oncology    NO PAST SURGERIES      SKIN LESION EXCISION Left 2/2/2021    Procedure: WIDE EXCISION MELANOMA SCAR UPPER BACK, intraoperative lymphatic mapping, sentinel lymph node biopsy; [address];  Surgeon: Royal Callie MD;  [facility];  Service: Surgical Oncology     Family History   Problem Relation Age of Onset    Basal cell carcinoma Father     No Known Problems Mother     Breast cancer Paternal Aunt 43    Breast cancer Paternal Grandmother [de-identified]     Social History     Socioeconomic History    Marital status: /Civil Union     Spouse name: Not on file    Number of children: Not on file    Years of education: Not on file    Highest education level: Not on file   Occupational History    Not on file   Tobacco Use    Smoking status: Never Smoker    Smokeless tobacco: Never Used   Vaping Use    Vaping Use: Never used   Substance and Sexual Activity    Alcohol use:  Yes    Drug use: Never    Sexual activity: Not on file   Other Topics Concern    Not on file   Social History Narrative    Not on file     Social Determinants of Health     Financial Resource Strain: Not on file   Food Insecurity: Not on file   Transportation Needs: Not on file   Physical Activity: Not on file   Stress: Not on file   Social Connections: Not on file   Intimate Partner Violence: Not on file   Housing Stability: Not on file       Current Outpatient Medications:     amLODIPine (NORVASC) 5 mg tablet, Take 1 tablet (5 mg total) by mouth daily, Disp: 30 tablet, Rfl: 3    dabrafenib (TAFINLAR) 75 MG capsule, Take 2 capsules (150 mg total) by mouth 2 (two) times a day Take on an empty stomach, Disp: 120 capsule, Rfl: 2    lisinopril (ZESTRIL) 20 mg tablet, Take 1 tablet (20 mg total) by mouth daily, Disp: 30 tablet, Rfl: 0    Trametinib Dimethyl Sulfoxide 2 MG TABS, Take 1 tablet (2 mg total) by mouth daily, Disp: 30 tablet, Rfl: 2  No Known Allergies  Vitals:    05/06/22 0929   BP: 134/84   Pulse: 97   Resp: 18   Temp: 97 7 °F (36 5 °C)   SpO2: 98%       Physical Exam  Vitals reviewed  Constitutional:       Appearance: Normal appearance  HENT:      Head: Normocephalic and atraumatic  Right Ear: External ear normal       Left Ear: External ear normal       Nose: Nose normal    Eyes:      Extraocular Movements: Extraocular movements intact  Pupils: Pupils are equal, round, and reactive to light  Cardiovascular:      Rate and Rhythm: Normal rate and regular rhythm  Pulses: Normal pulses  Heart sounds: Normal heart sounds  Pulmonary:      Effort: Pulmonary effort is normal       Breath sounds: Normal breath sounds  Abdominal:      General: Abdomen is flat  Palpations: Abdomen is soft  Musculoskeletal:         General: Normal range of motion  Cervical back: Normal range of motion and neck supple  Skin:     General: Skin is warm and dry        Comments: Back excision site without evidence of recurrence visible or palpable  Negative for cervical or axillary adenopathy  Neurological:      General: No focal deficit present  Mental Status: He is alert and oriented to person, place, and time  Psychiatric:         Mood and Affect: Mood normal          Behavior: Behavior normal          Thought Content: Thought content normal          Judgment: Judgment normal            Results:  Labs:  Component Ref Range & Units 2/7/22  7:54 AM 1/20/22  6:20 AM 10/19/21  7:03 AM    - 271 U/L 195            Imaging  CT CHEST, ABDOMEN AND PELVIS WITH IV CONTRAST     INDICATION:   C43 9: Malignant melanoma of skin, unspecified  C77 9: Secondary and unspecified malignant neoplasm of lymph node, unspecified      COMPARISON:  CT chest abdomen pelvis 10/19/2021  PET CT 11/5/2021      TECHNIQUE: CT examination of the chest, abdomen and pelvis was performed  Axial, sagittal, and coronal 2D reformatted images were created from the source data and submitted for interpretation      Radiation dose length product (DLP) for this visit:  994 04 mGy-cm   This examination, like all CT scans performed in the [facility], was performed utilizing techniques to minimize radiation dose exposure, including the use of iterative   reconstruction and automated exposure control      IV Contrast:  100 mL of iohexol (OMNIPAQUE)  Enteric Contrast: Enteric contrast was administered      FINDINGS:     CHEST     LUNGS:  Previously seen peripheral groundglass opacities are much improved  A few residual mild opacities are present and could represent postinfectious scarring      No acute pulmonary findings  No endotracheal or endobronchial lesion      PLEURA:  Unremarkable      HEART/GREAT VESSELS: Heart is unremarkable for patient's age    No thoracic aortic aneurysm      MEDIASTINUM AND MAGALYS:  Unremarkable      CHEST WALL AND LOWER NECK:   Left posterior para midline chest wall surgical scar decreased in thickness when compared with the prior CT      ABDOMEN     LIVER/BILIARY TREE:  Unchanged well-circumscribed 8 mm segment 5 hypodensity, likely a cyst  #2/55     GALLBLADDER:  No calcified gallstones  No pericholecystic inflammatory change      SPLEEN:  Unremarkable      PANCREAS:  Unremarkable      ADRENAL GLANDS:  Unremarkable      KIDNEYS/URETERS:  Unchanged subcentimeter left renal cyst  No hydronephrosis      STOMACH AND BOWEL:  There is colonic diverticulosis without evidence of acute diverticulitis      APPENDIX:  No findings to suggest appendicitis      ABDOMINOPELVIC CAVITY:  No ascites  No pneumoperitoneum  No lymphadenopathy      Previously seen fat stranding/soft tissue infiltration adjacent to the pancreas has resolved      VESSELS:  Unremarkable for patient's age      PELVIS     REPRODUCTIVE ORGANS:  Unremarkable for patient's age      URINARY BLADDER:  Unremarkable      ABDOMINAL WALL/INGUINAL REGIONS:  Unremarkable      OSSEOUS STRUCTURES:  No acute fracture or destructive osseous lesion      IMPRESSION:     Previously seen fat stranding/soft tissue infiltration adjacent to the pancreas has resolved      Pulmonary groundglass opacity seen on the prior study are much improved  A few residual mild opacities are present and could represent postinfectious scarring      No signs of metastatic disease in the chest, abdomen or pelvis in this patient with a history of lymphoma        Workstation performed: [id number]      I reviewed the above laboratory and imaging data  Discussion/Summary: Stage 3b melanoma, presently JEFF  Continue close surveillance, with scans in 4 months  F/u with Dr Domingo Schulte as well

## 2022-05-16 ENCOUNTER — APPOINTMENT (OUTPATIENT)
Dept: LAB | Facility: HOSPITAL | Age: 47
End: 2022-05-16
Payer: COMMERCIAL

## 2022-05-17 DIAGNOSIS — I10 PRIMARY HYPERTENSION: ICD-10-CM

## 2022-05-17 RX ORDER — LISINOPRIL 20 MG/1
20 TABLET ORAL DAILY
Qty: 30 TABLET | Refills: 0 | Status: SHIPPED | OUTPATIENT
Start: 2022-05-17 | End: 2022-06-20 | Stop reason: SDUPTHER

## 2022-05-25 ENCOUNTER — OFFICE VISIT (OUTPATIENT)
Dept: HEMATOLOGY ONCOLOGY | Facility: CLINIC | Age: 47
End: 2022-05-25
Payer: COMMERCIAL

## 2022-05-25 VITALS
WEIGHT: 213.5 LBS | TEMPERATURE: 97.4 F | SYSTOLIC BLOOD PRESSURE: 132 MMHG | RESPIRATION RATE: 18 BRPM | DIASTOLIC BLOOD PRESSURE: 86 MMHG | BODY MASS INDEX: 32.36 KG/M2 | OXYGEN SATURATION: 98 % | HEIGHT: 68 IN | HEART RATE: 75 BPM

## 2022-05-25 DIAGNOSIS — Z79.899 HIGH RISK MEDICATION USE: ICD-10-CM

## 2022-05-25 DIAGNOSIS — C43.59 MALIGNANT MELANOMA OF UPPER BACK (HCC): Primary | ICD-10-CM

## 2022-05-25 PROCEDURE — 3079F DIAST BP 80-89 MM HG: CPT | Performed by: INTERNAL MEDICINE

## 2022-05-25 PROCEDURE — 3075F SYST BP GE 130 - 139MM HG: CPT | Performed by: INTERNAL MEDICINE

## 2022-05-25 PROCEDURE — 3008F BODY MASS INDEX DOCD: CPT | Performed by: INTERNAL MEDICINE

## 2022-05-25 PROCEDURE — 99214 OFFICE O/P EST MOD 30 MIN: CPT | Performed by: INTERNAL MEDICINE

## 2022-05-25 NOTE — LETTER
May 30, 2022     Domi Vazquez DO  [address] [place] [address]    Patient: Jesenia Brock   YOB: 1975   Date of Visit: 5/25/2022       Dear Dr Yael Baer:    Thank you for referring Gustavoaparna AguirreMary to me for evaluation  Below are my notes for this consultation  If you have questions, please do not hesitate to call me  I look forward to following your patient along with you  Sincerely,        Milton Humphreys MD        CC: Lavella Barber, MD Ceasar Frankel, CRNP Merwyn Conine, MD  5/30/2022  7:04 PM  Sign when Signing Visit  [address] [place] [address]  [phone]     Date of Visit: 5/25/2022  Name: Jesenia Brock   YOB: 1975        Subjective    VISIT DIAGNOSIS:  Diagnoses and all orders for this visit:    Malignant melanoma of upper back ([facility] [address] )  -     ECG 12 lead; Future  -     Echo complete w/ contrast if indicated; Future  -     ECG 12 lead; Future  -     Echo complete w/ contrast if indicated; Future  -     CBC and differential; Future  -     Comprehensive metabolic panel; Future  -     LD,Blood; Future    High risk medication use  -     ECG 12 lead; Future  -     Echo complete w/ contrast if indicated; Future  -     ECG 12 lead; Future  -     Echo complete w/ contrast if indicated; Future  -     CBC and differential; Future  -     Comprehensive metabolic panel; Future  -     LD,Blood;  Future        Oncology History   Malignant melanoma of upper back ([facility] [address] )   1/4/2020 Biopsy    Left upper back shave biopsy:  - Malignant melanoma 2 3mm    Mitosis: 6  Ulceration: not identified     2/2/2021 Surgery    Wide excision with sentinel lymph node biopsy  Lymph Node, [place], number 1, biopsy:  - Metastatic melanoma in one lymph node (1/1, larger focus approximately 1 3 mm; subcapsular and intraparenchyma), no extranodal extension      Skin, left back, excision:  - Residual melanoma, 1 3 mm; prior biopsy site reaction   - Inked margins with no tumor seen  Lymph Node, [place], sentinel node #2, biopsy:  - Metastatic melanoma in one of ten lymph nodes (1/10, single and small nests of cells, subcapsular, larger focus 0 11 mm); no extranodal extension  2/2/2021 -  Cancer Staged    Staging form: Melanoma of the Skin, AJCC 8th Edition  - Pathologic stage from 2/2/2021: Stage IIIB (pT3a, pN2a, cM0) - Signed by Eliana Smith MD on 2/4/2022  Stage prefix: Initial diagnosis       4/8/2021 Genomic Testing    Decision DX: Class 1B     4/2021 -  Chemotherapy     Tafinalar 150 mg b i d  Mekinist 2 mg p o  Daily        Cancer Staging  Malignant melanoma of upper back [facility])  Staging form: Melanoma of the Skin, AJCC 8th Edition  - Pathologic stage from 2/2/2021: Stage IIIB (pT3a, pN2a, cM0) - Signed by Eliana Smith MD on 2/4/2022         HISTORY OF PRESENT ILLNESS: Kaley Gonzalez is a 48 y o  male  who has stage IIIB melanoma status post 1 year of adjuvant treatment with encorafenib and binimetinib here for follow-up  He is doing well  He completed treatment with encorafenib and binimetinib 1 month ago  He feels well  Has no issues or complaints  Has no side effects  Blood pressure better controlled on lisinopril  He takes amlodipine as needed  Needs to follow-up with Dermatology  Denies any new, changing, concerning skin lesions  Denies new lymphadenopathy  Denies headaches, double vision, rash, itching, diarrhea, arthralgias, swelling, chest pain, shortness a breath  REVIEW OF SYSTEMS:  Review of Systems   Constitutional: Negative for appetite change, fatigue, fever and unexpected weight change  HENT:   Negative for lump/mass  Eyes: Negative for icterus  Respiratory: Negative for cough, shortness of breath and wheezing  Cardiovascular: Negative for leg swelling  Gastrointestinal: Negative for abdominal pain, constipation, diarrhea, nausea and vomiting     Genitourinary: Negative for difficulty urinating and hematuria  Musculoskeletal: Negative for arthralgias, gait problem and myalgias  Skin: Negative for itching and rash  No new, changing, or concerning lesions  Neurological: Negative for extremity weakness, gait problem, headaches, light-headedness and numbness  Hematological: Negative for adenopathy  MEDICATIONS:    Current Outpatient Medications:     lisinopril (ZESTRIL) 20 mg tablet, Take 1 tablet (20 mg total) by mouth in the morning , Disp: 30 tablet, Rfl: 0    amLODIPine (NORVASC) 5 mg tablet, Take 1 tablet (5 mg total) by mouth daily (Patient not taking: Reported on 5/25/2022), Disp: 30 tablet, Rfl: 3     ALLERGIES:  No Known Allergies     ACTIVE PROBLEMS:  Patient Active Problem List   Diagnosis    Malignant melanoma of upper back ([facility] [address] )    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    Primary hypertension          PAST MEDICAL HISTORY:   History reviewed  No pertinent past medical history  PAST SURGICAL HISTORY:  Past Surgical History:   Procedure Laterality Date    LYMPH NODE BIOPSY N/A 2/2/2021    Procedure: BIOPSY LYMPH NODE LEFT AXILLARY SENTINEL;  Surgeon: Jessi Bowie MD;  [facility];  Service: Surgical Oncology    NO PAST SURGERIES      SKIN LESION EXCISION Left 2/2/2021    Procedure: WIDE EXCISION MELANOMA SCAR UPPER BACK, intraoperative lymphatic mapping, sentinel lymph node biopsy; [address];  Surgeon: Jessi Bowie MD;  Location: [facility];  Service: Surgical Oncology        SOCIAL HISTORY:  Social History     Socioeconomic History    Marital status: /Civil Union     Spouse name: None    Number of children: None    Years of education: None    Highest education level: None   Occupational History    None   Tobacco Use    Smoking status: Never Smoker    Smokeless tobacco: Never Used   Vaping Use    Vaping Use: Never used   Substance and Sexual Activity    Alcohol use:  Yes    Drug use: Never    Sexual activity: None   Other Topics Concern    None   Social History Narrative    None     Social Determinants of Health     Financial Resource Strain: Not on file   Food Insecurity: Not on file   Transportation Needs: Not on file   Physical Activity: Not on file   Stress: Not on file   Social Connections: Not on file   Intimate Partner Violence: Not on file   Housing Stability: Not on file        FAMILY HISTORY:  Family History   Problem Relation Age of Onset    Basal cell carcinoma Father     No Known Problems Mother     Breast cancer Paternal Aunt 43    Breast cancer Paternal Grandmother [de-identified]           Objective    PHYSICAL EXAMINATION:   Blood pressure 132/86, pulse 75, temperature (!) 97 4 °F (36 3 °C), resp  rate 18, height 5' 8" (1 727 m), weight 96 8 kg (213 lb 8 oz), SpO2 98 %  Pain Score: 0-No pain     ECOG Performance Status    Flowsheet Row Most Recent Value   ECOG Performance Status 0 - Fully active, able to carry on all pre-disease performance without restriction             Physical Exam  Constitutional:       General: He is not in acute distress  Appearance: Normal appearance  He is not toxic-appearing  HENT:      Mouth/Throat:      Mouth: Mucous membranes are moist       Pharynx: Oropharynx is clear  Eyes:      General: No scleral icterus  Cardiovascular:      Rate and Rhythm: Normal rate and regular rhythm  Pulses: Normal pulses  Heart sounds: No murmur heard  No friction rub  No gallop  Pulmonary:      Effort: Pulmonary effort is normal  No respiratory distress  Breath sounds: Normal breath sounds  No wheezing or rales  Chest:   Breasts:      Right: No axillary adenopathy or supraclavicular adenopathy  Left: No axillary adenopathy or supraclavicular adenopathy  Abdominal:      General: There is no distension  Palpations: There is no mass  Tenderness: There is no abdominal tenderness  There is no rebound     Musculoskeletal: General: No swelling or tenderness  Right lower leg: No edema  Left lower leg: No edema  Lymphadenopathy:      Head:      Right side of head: No submandibular, preauricular or posterior auricular adenopathy  Left side of head: No submandibular, preauricular or posterior auricular adenopathy  Cervical: No cervical adenopathy  Right cervical: No superficial or posterior cervical adenopathy  Left cervical: No superficial or posterior cervical adenopathy  Upper Body:      Right upper body: No supraclavicular or axillary adenopathy  Left upper body: No supraclavicular or axillary adenopathy  Lower Body: No right inguinal adenopathy  No left inguinal adenopathy  Skin:     Findings: No rash  Comments: Well healed surgical scar  No evidence of recurrence at primary site  Neurological:      General: No focal deficit present  Mental Status: He is alert and oriented to person, place, and time  Psychiatric:         Mood and Affect: Mood normal          Behavior: Behavior normal          Thought Content: Thought content normal          Judgment: Judgment normal          I reviewed lab data in the chart      WBC   Date Value Ref Range Status   05/16/2022 4 67 4 31 - 10 16 Thousand/uL Final   03/02/2022 4 88 4 31 - 10 16 Thousand/uL Final   02/07/2022 4 61 4 31 - 10 16 Thousand/uL Final     Hemoglobin   Date Value Ref Range Status   05/16/2022 13 4 12 0 - 17 0 g/dL Final   03/02/2022 14 7 12 0 - 17 0 g/dL Final   02/07/2022 14 2 12 0 - 17 0 g/dL Final     Platelets   Date Value Ref Range Status   05/16/2022 178 149 - 390 Thousands/uL Final   03/02/2022 155 149 - 390 Thousands/uL Final   02/07/2022 202 149 - 390 Thousands/uL Final     MCV   Date Value Ref Range Status   05/16/2022 97 82 - 98 fL Final   03/02/2022 96 82 - 98 fL Final   02/07/2022 94 82 - 98 fL Final      Potassium   Date Value Ref Range Status   05/16/2022 3 7 3 5 - 5 3 mmol/L Final   03/02/2022 3 8 3 5 - 5 3 mmol/L Final   02/07/2022 3 8 3 5 - 5 3 mmol/L Final     Chloride   Date Value Ref Range Status   05/16/2022 100 96 - 108 mmol/L Final   03/02/2022 102 96 - 108 mmol/L Final   02/07/2022 102 96 - 108 mmol/L Final     CO2   Date Value Ref Range Status   05/16/2022 23 21 - 32 mmol/L Final   03/02/2022 25 21 - 32 mmol/L Final   02/07/2022 25 21 - 32 mmol/L Final     BUN   Date Value Ref Range Status   05/16/2022 16 5 - 25 mg/dL Final   03/02/2022 25 5 - 25 mg/dL Final   02/07/2022 18 5 - 25 mg/dL Final     Creatinine   Date Value Ref Range Status   05/16/2022 1 19 0 60 - 1 30 mg/dL Final     Comment:     Standardized to IDMS reference method   03/02/2022 1 27 0 60 - 1 30 mg/dL Final     Comment:     Standardized to IDMS reference method   02/07/2022 1 24 0 60 - 1 30 mg/dL Final     Comment:     Standardized to IDMS reference method     Calcium   Date Value Ref Range Status   05/16/2022 9 2 8 4 - 10 2 mg/dL Final   03/02/2022 9 9 8 4 - 10 2 mg/dL Final   02/07/2022 9 6 8 4 - 10 2 mg/dL Final     Albumin   Date Value Ref Range Status   05/16/2022 4 4 3 5 - 5 0 g/dL Final   03/02/2022 4 4 3 5 - 5 0 g/dL Final   02/07/2022 4 3 3 5 - 5 0 g/dL Final     Total Bilirubin   Date Value Ref Range Status   05/16/2022 0 63 0 20 - 1 00 mg/dL Final   03/02/2022 0 38 0 20 - 1 00 mg/dL Final   02/07/2022 0 41 0 20 - 1 00 mg/dL Final     Alkaline Phosphatase   Date Value Ref Range Status   05/16/2022 55 34 - 104 U/L Final   03/02/2022 55 34 - 104 U/L Final   02/07/2022 73 34 - 104 U/L Final     AST   Date Value Ref Range Status   05/16/2022 21 13 - 39 U/L Final     Comment:     Specimen collection should occur prior to Sulfasalazine administration due to the potential for falsely depressed results  03/02/2022 24 13 - 39 U/L Final     Comment:     Specimen collection should occur prior to Sulfasalazine administration due to the potential for falsely depressed results      02/07/2022 22 13 - 39 U/L Final     Comment:     Specimen collection should occur prior to Sulfasalazine administration due to the potential for falsely depressed results  ALT   Date Value Ref Range Status   05/16/2022 26 7 - 52 U/L Final     Comment:     Specimen collection should occur prior to Sulfasalazine administration due to the potential for falsely depressed results  03/02/2022 24 7 - 52 U/L Final     Comment:     Specimen collection should occur prior to Sulfasalazine administration due to the potential for falsely depressed results  02/07/2022 22 7 - 52 U/L Final     Comment:     Specimen collection should occur prior to Sulfasalazine administration due to the potential for falsely depressed results  LD   Date Value Ref Range Status   05/16/2022 183 140 - 271 U/L Final   03/02/2022 196 140 - 271 U/L Final   02/07/2022 195 140 - 271 U/L Final     No results found for: TSH  No results found for: [id number]   No results found for: FREET4      RECENT IMAGING:  No results found  Assessment/Plan    Mr Storm Purcell is a 48 yr male with stage IIIB melanoma status post 1 year of adjuvant treatment with encorafenib and binimetinib here for follow-up  1  Malignant melanoma of upper back ([facility] [address] )  He is doing well and tolerated treatment without any issues  Labs reviewed and okay  He is due for an EKG and echo now  Orders placed in scripts provided  He has imaging scheduled to evaluate disease status  We will call him with results once they have been obtained  We discussed that we will continue with follow-up every 3 months with physical exams and blood work for 2 more years, which would be 3 years from the time of diagnosis  We will then switch to physical exams and blood work every 6 months  We will continue scanning with full-body imaging every 6 months  We discussed monitoring cardiac function now and in 3 months, and if things remain normal and stable we will discontinue as he is no longer on the BRAF/MEK inhibitors      All orders placed in scripts provided  He understands and is in agreement with the plan  He will follow-up with Dermatology  - ECG 12 lead; Future  - Echo complete w/ contrast if indicated; Future  - ECG 12 lead; Future  - Echo complete w/ contrast if indicated; Future  - CBC and differential; Future  - Comprehensive metabolic panel; Future  - LD,Blood; Future    2  High risk medication use  He completed treatment with targeted therapy and we will continue to monitor for side effects and lab abnormalities  We will monitor labs periodically, as well as EKG and ECHO given cardiac toxivity, to ensure safety of continuing on treatment  He knows to watch for signs and symptoms for side effects  - ECG 12 lead; Future  - Echo complete w/ contrast if indicated; Future  - ECG 12 lead; Future  - Echo complete w/ contrast if indicated; Future  - CBC and differential; Future  - Comprehensive metabolic panel; Future  - LD,Blood; Future    He will return to clinic in 3 months  He knows to call with any issues or concerns prior to his next visit      Krishna Drew MD, PhD

## 2022-05-30 NOTE — PROGRESS NOTES
[facility]  [address] [id number] [phone]     Date of Visit: 5/25/2022  Name: Theron Goodman   YOB: 1975        Subjective    VISIT DIAGNOSIS:  Diagnoses and all orders for this visit:    Malignant melanoma of upper back ([facility] [address] )  -     ECG 12 lead; Future  -     Echo complete w/ contrast if indicated; Future  -     ECG 12 lead; Future  -     Echo complete w/ contrast if indicated; Future  -     CBC and differential; Future  -     Comprehensive metabolic panel; Future  -     LD,Blood; Future    High risk medication use  -     ECG 12 lead; Future  -     Echo complete w/ contrast if indicated; Future  -     ECG 12 lead; Future  -     Echo complete w/ contrast if indicated; Future  -     CBC and differential; Future  -     Comprehensive metabolic panel; Future  -     LD,Blood; Future        Oncology History   Malignant melanoma of upper back ([facility] [address] )   1/4/2020 Biopsy    Left upper back shave biopsy:  - Malignant melanoma 2 3mm    Mitosis: 6  Ulceration: not identified     2/2/2021 Surgery    Wide excision with sentinel lymph node biopsy  Lymph Node, Byron, number 1, biopsy:  - Metastatic melanoma in one lymph node (1/1, larger focus approximately 1 3 mm; subcapsular and intraparenchyma), no extranodal extension      Skin, left back, excision:  - Residual melanoma, 1 3 mm; prior biopsy site reaction   - Inked margins with no tumor seen  Lymph Node, Byron, sentinel node #2, biopsy:  - Metastatic melanoma in one of ten lymph nodes (1/10, single and small nests of cells, subcapsular, larger focus 0 11 mm); no extranodal extension         2/2/2021 -  Cancer Staged    Staging form: Melanoma of the Skin, AJCC 8th Edition  - Pathologic stage from 2/2/2021: Stage IIIB (pT3a, pN2a, cM0) - Signed by Nicci Schrader MD on 2/4/2022  Stage prefix: Initial diagnosis       4/8/2021 Genomic Testing    Decision DX: Class 1B     4/2021 - Chemotherapy     Tafinalar 150 mg b i d  Mekinist 2 mg p o  Daily        Cancer Staging  Malignant melanoma of upper back [facility])  Staging form: Melanoma of the Skin, AJCC 8th Edition  - Pathologic stage from 2/2/2021: Stage IIIB (pT3a, pN2a, cM0) - Signed by Mallory Gil MD on 2/4/2022         HISTORY OF PRESENT ILLNESS: Karan Emerson is a 48 y o  male  who has stage IIIB melanoma status post 1 year of adjuvant treatment with encorafenib and binimetinib here for follow-up  He is doing well  He completed treatment with encorafenib and binimetinib 1 month ago  He feels well  Has no issues or complaints  Has no side effects  Blood pressure better controlled on lisinopril  He takes amlodipine as needed  Needs to follow-up with Dermatology  Denies any new, changing, concerning skin lesions  Denies new lymphadenopathy  Denies headaches, double vision, rash, itching, diarrhea, arthralgias, swelling, chest pain, shortness a breath  REVIEW OF SYSTEMS:  Review of Systems   Constitutional: Negative for appetite change, fatigue, fever and unexpected weight change  HENT:   Negative for lump/mass  Eyes: Negative for icterus  Respiratory: Negative for cough, shortness of breath and wheezing  Cardiovascular: Negative for leg swelling  Gastrointestinal: Negative for abdominal pain, constipation, diarrhea, nausea and vomiting  Genitourinary: Negative for difficulty urinating and hematuria  Musculoskeletal: Negative for arthralgias, gait problem and myalgias  Skin: Negative for itching and rash  No new, changing, or concerning lesions  Neurological: Negative for extremity weakness, gait problem, headaches, light-headedness and numbness  Hematological: Negative for adenopathy          MEDICATIONS:    Current Outpatient Medications:     lisinopril (ZESTRIL) 20 mg tablet, Take 1 tablet (20 mg total) by mouth in the morning , Disp: 30 tablet, Rfl: 0    amLODIPine (NORVASC) 5 mg tablet, Take 1 tablet (5 mg total) by mouth daily (Patient not taking: Reported on 5/25/2022), Disp: 30 tablet, Rfl: 3     ALLERGIES:  No Known Allergies     ACTIVE PROBLEMS:  Patient Active Problem List   Diagnosis    Malignant melanoma of upper back ([facility] [address] )    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 ([facility] [address] )    Primary hypertension          PAST MEDICAL HISTORY:   History reviewed  No pertinent past medical history  PAST SURGICAL HISTORY:  Past Surgical History:   Procedure Laterality Date    LYMPH NODE BIOPSY N/A 2/2/2021    Procedure: BIOPSY LYMPH NODE LEFT AXILLARY SENTINEL;  Surgeon: Clif Mcallister MD;  Location: [facility];  Service: Surgical Oncology    NO PAST SURGERIES      SKIN LESION EXCISION Left 2/2/2021    Procedure: WIDE EXCISION MELANOMA SCAR UPPER BACK, intraoperative lymphatic mapping, sentinel lymph node biopsy; [address];  Surgeon: Clif Mcallister MD;  Location: [facility];  Service: Surgical Oncology        SOCIAL HISTORY:  Social History     Socioeconomic History    Marital status: /Civil Union     Spouse name: None    Number of children: None    Years of education: None    Highest education level: None   Occupational History    None   Tobacco Use    Smoking status: Never Smoker    Smokeless tobacco: Never Used   Vaping Use    Vaping Use: Never used   Substance and Sexual Activity    Alcohol use:  Yes    Drug use: Never    Sexual activity: None   Other Topics Concern    None   Social History Narrative    None     Social Determinants of Health     Financial Resource Strain: Not on file   Food Insecurity: Not on file   Transportation Needs: Not on file   Physical Activity: Not on file   Stress: Not on file   Social Connections: Not on file   Intimate Partner Violence: Not on file   Housing Stability: Not on file        FAMILY HISTORY:  Family History   Problem Relation Age of Onset    Basal cell carcinoma Father     No Known Problems Mother     Breast cancer Paternal Aunt 43    Breast cancer Paternal Grandmother [de-identified]           Objective    PHYSICAL EXAMINATION:   Blood pressure 132/86, pulse 75, temperature (!) 97 4 °F (36 3 °C), resp  rate 18, height 5' 8" (1 727 m), weight 96 8 kg (213 lb 8 oz), SpO2 98 %  Pain Score: 0-No pain     ECOG Performance Status    Flowsheet Row Most Recent Value   ECOG Performance Status 0 - Fully active, able to carry on all pre-disease performance without restriction             Physical Exam  Constitutional:       General: He is not in acute distress  Appearance: Normal appearance  He is not toxic-appearing  HENT:      Mouth/Throat:      Mouth: Mucous membranes are moist       Pharynx: Oropharynx is clear  Eyes:      General: No scleral icterus  Cardiovascular:      Rate and Rhythm: Normal rate and regular rhythm  Pulses: Normal pulses  Heart sounds: No murmur heard  No friction rub  No gallop  Pulmonary:      Effort: Pulmonary effort is normal  No respiratory distress  Breath sounds: Normal breath sounds  No wheezing or rales  Chest:   Breasts:      Right: No axillary adenopathy or supraclavicular adenopathy  Left: No axillary adenopathy or supraclavicular adenopathy  Abdominal:      General: There is no distension  Palpations: There is no mass  Tenderness: There is no abdominal tenderness  There is no rebound  Musculoskeletal:         General: No swelling or tenderness  Right lower leg: No edema  Left lower leg: No edema  Lymphadenopathy:      Head:      Right side of head: No submandibular, preauricular or posterior auricular adenopathy  Left side of head: No submandibular, preauricular or posterior auricular adenopathy  Cervical: No cervical adenopathy  Right cervical: No superficial or posterior cervical adenopathy  Left cervical: No superficial or posterior cervical adenopathy        Upper Body:      Right upper body: No supraclavicular or axillary adenopathy  Left upper body: No supraclavicular or axillary adenopathy  Lower Body: No right inguinal adenopathy  No left inguinal adenopathy  Skin:     Findings: No rash  Comments: Well healed surgical scar  No evidence of recurrence at primary site  Neurological:      General: No focal deficit present  Mental Status: He is alert and oriented to person, place, and time  Psychiatric:         Mood and Affect: Mood normal          Behavior: Behavior normal          Thought Content: Thought content normal          Judgment: Judgment normal          I reviewed lab data in the chart      WBC   Date Value Ref Range Status   05/16/2022 4 67 4 31 - 10 16 Thousand/uL Final   03/02/2022 4 88 4 31 - 10 16 Thousand/uL Final   02/07/2022 4 61 4 31 - 10 16 Thousand/uL Final     Hemoglobin   Date Value Ref Range Status   05/16/2022 13 4 12 0 - 17 0 g/dL Final   03/02/2022 14 7 12 0 - 17 0 g/dL Final   02/07/2022 14 2 12 0 - 17 0 g/dL Final     Platelets   Date Value Ref Range Status   05/16/2022 178 149 - 390 Thousands/uL Final   03/02/2022 155 149 - 390 Thousands/uL Final   02/07/2022 202 149 - 390 Thousands/uL Final     MCV   Date Value Ref Range Status   05/16/2022 97 82 - 98 fL Final   03/02/2022 96 82 - 98 fL Final   02/07/2022 94 82 - 98 fL Final      Potassium   Date Value Ref Range Status   05/16/2022 3 7 3 5 - 5 3 mmol/L Final   03/02/2022 3 8 3 5 - 5 3 mmol/L Final   02/07/2022 3 8 3 5 - 5 3 mmol/L Final     Chloride   Date Value Ref Range Status   05/16/2022 100 96 - 108 mmol/L Final   03/02/2022 102 96 - 108 mmol/L Final   02/07/2022 102 96 - 108 mmol/L Final     CO2   Date Value Ref Range Status   05/16/2022 23 21 - 32 mmol/L Final   03/02/2022 25 21 - 32 mmol/L Final   02/07/2022 25 21 - 32 mmol/L Final     BUN   Date Value Ref Range Status   05/16/2022 16 5 - 25 mg/dL Final   03/02/2022 25 5 - 25 mg/dL Final   02/07/2022 18 5 - 25 mg/dL Final     Creatinine   Date Value Ref Range Status 05/16/2022 1 19 0 60 - 1 30 mg/dL Final     Comment:     Standardized to IDMS reference method   03/02/2022 1 27 0 60 - 1 30 mg/dL Final     Comment:     Standardized to IDMS reference method   02/07/2022 1 24 0 60 - 1 30 mg/dL Final     Comment:     Standardized to IDMS reference method     Calcium   Date Value Ref Range Status   05/16/2022 9 2 8 4 - 10 2 mg/dL Final   03/02/2022 9 9 8 4 - 10 2 mg/dL Final   02/07/2022 9 6 8 4 - 10 2 mg/dL Final     Albumin   Date Value Ref Range Status   05/16/2022 4 4 3 5 - 5 0 g/dL Final   03/02/2022 4 4 3 5 - 5 0 g/dL Final   02/07/2022 4 3 3 5 - 5 0 g/dL Final     Total Bilirubin   Date Value Ref Range Status   05/16/2022 0 63 0 20 - 1 00 mg/dL Final   03/02/2022 0 38 0 20 - 1 00 mg/dL Final   02/07/2022 0 41 0 20 - 1 00 mg/dL Final     Alkaline Phosphatase   Date Value Ref Range Status   05/16/2022 55 34 - 104 U/L Final   03/02/2022 55 34 - 104 U/L Final   02/07/2022 73 34 - 104 U/L Final     AST   Date Value Ref Range Status   05/16/2022 21 13 - 39 U/L Final     Comment:     Specimen collection should occur prior to Sulfasalazine administration due to the potential for falsely depressed results  03/02/2022 24 13 - 39 U/L Final     Comment:     Specimen collection should occur prior to Sulfasalazine administration due to the potential for falsely depressed results  02/07/2022 22 13 - 39 U/L Final     Comment:     Specimen collection should occur prior to Sulfasalazine administration due to the potential for falsely depressed results  ALT   Date Value Ref Range Status   05/16/2022 26 7 - 52 U/L Final     Comment:     Specimen collection should occur prior to Sulfasalazine administration due to the potential for falsely depressed results  03/02/2022 24 7 - 52 U/L Final     Comment:     Specimen collection should occur prior to Sulfasalazine administration due to the potential for falsely depressed results      02/07/2022 22 7 - 52 U/L Final     Comment: Specimen collection should occur prior to Sulfasalazine administration due to the potential for falsely depressed results  LD   Date Value Ref Range Status   05/16/2022 183 140 - 271 U/L Final   03/02/2022 196 140 - 271 U/L Final   02/07/2022 195 140 - 271 U/L Final     No results found for: TSH  No results found for: G2QDQRO   No results found for: FREET4      RECENT IMAGING:  No results found  Assessment/Plan    Mr Shailesh Marie is a 48 yr male with stage IIIB melanoma status post 1 year of adjuvant treatment with encorafenib and binimetinib here for follow-up  1  Malignant melanoma of upper back ([facility] [address] )  He is doing well and tolerated treatment without any issues  Labs reviewed and okay  He is due for an EKG and echo now  Orders placed in scripts provided  He has imaging scheduled to evaluate disease status  We will call him with results once they have been obtained  We discussed that we will continue with follow-up every 3 months with physical exams and blood work for 2 more years, which would be 3 years from the time of diagnosis  We will then switch to physical exams and blood work every 6 months  We will continue scanning with full-body imaging every 6 months  We discussed monitoring cardiac function now and in 3 months, and if things remain normal and stable we will discontinue as he is no longer on the BRAF/MEK inhibitors  All orders placed in scripts provided  He understands and is in agreement with the plan  He will follow-up with Dermatology  - ECG 12 lead; Future  - Echo complete w/ contrast if indicated; Future  - ECG 12 lead; Future  - Echo complete w/ contrast if indicated; Future  - CBC and differential; Future  - Comprehensive metabolic panel; Future  - LD,Blood; Future    2  High risk medication use  He completed treatment with targeted therapy and we will continue to monitor for side effects and lab abnormalities    We will monitor labs periodically, as well as EKG and ECHO given cardiac toxivity, to ensure safety of continuing on treatment  He knows to watch for signs and symptoms for side effects  - ECG 12 lead; Future  - Echo complete w/ contrast if indicated; Future  - ECG 12 lead; Future  - Echo complete w/ contrast if indicated; Future  - CBC and differential; Future  - Comprehensive metabolic panel; Future  - LD,Blood; Future    He will return to clinic in 3 months  He knows to call with any issues or concerns prior to his next visit      Claudia Lan MD, PhD

## 2022-06-20 DIAGNOSIS — I10 PRIMARY HYPERTENSION: ICD-10-CM

## 2022-06-20 RX ORDER — LISINOPRIL 20 MG/1
20 TABLET ORAL DAILY
Qty: 30 TABLET | Refills: 0 | Status: SHIPPED | OUTPATIENT
Start: 2022-06-20 | End: 2022-07-16 | Stop reason: SDUPTHER

## 2022-07-06 ENCOUNTER — OFFICE VISIT (OUTPATIENT)
Dept: LAB | Facility: HOSPITAL | Age: 47
End: 2022-07-06
Attending: INTERNAL MEDICINE
Payer: COMMERCIAL

## 2022-07-06 ENCOUNTER — HOSPITAL ENCOUNTER (OUTPATIENT)
Dept: NON INVASIVE DIAGNOSTICS | Facility: HOSPITAL | Age: 47
Discharge: HOME | End: 2022-07-06
Attending: INTERNAL MEDICINE
Payer: COMMERCIAL

## 2022-07-06 VITALS
WEIGHT: 213.41 LBS | BODY MASS INDEX: 32.34 KG/M2 | HEART RATE: 84 BPM | SYSTOLIC BLOOD PRESSURE: 136 MMHG | DIASTOLIC BLOOD PRESSURE: 76 MMHG | HEIGHT: 68 IN

## 2022-07-06 DIAGNOSIS — C43.59 MALIGNANT MELANOMA OF UPPER BACK (HCC): ICD-10-CM

## 2022-07-06 DIAGNOSIS — Z79.899 HIGH RISK MEDICATION USE: ICD-10-CM

## 2022-07-06 LAB
AORTIC ROOT: 3.4 CM
APICAL FOUR CHAMBER EJECTION FRACTION: 55 %
ASCENDING AORTA: 3.2 CM
ATRIAL RATE: 95 BPM
E WAVE DECELERATION TIME: 164 MS
FRACTIONAL SHORTENING: 29 % (ref 28–44)
INTERVENTRICULAR SEPTUM IN DIASTOLE (PARASTERNAL SHORT AXIS VIEW): 1 CM
INTERVENTRICULAR SEPTUM: 1 CM (ref 0.6–1.1)
LAAS-AP4: 13.5 CM2
LEFT ATRIUM SIZE: 3.3 CM
LEFT INTERNAL DIMENSION IN SYSTOLE: 3.5 CM (ref 2.1–4)
LEFT VENTRICULAR INTERNAL DIMENSION IN DIASTOLE: 4.9 CM (ref 3.5–6)
LEFT VENTRICULAR POSTERIOR WALL IN END DIASTOLE: 1 CM
LEFT VENTRICULAR STROKE VOLUME: 63 ML
LVSV (TEICH): 63 ML
MV E'TISSUE VEL-LAT: 14 CM/S
MV E'TISSUE VEL-SEP: 10 CM/S
MV PEAK A VEL: 0.71 M/S
MV PEAK E VEL: 80 CM/S
MV STENOSIS PRESSURE HALF TIME: 47 MS
MV VALVE AREA P 1/2 METHOD: 4.68 CM2
P AXIS: 15 DEGREES
PR INTERVAL: 150 MS
QRS AXIS: 23 DEGREES
QRSD INTERVAL: 86 MS
QT INTERVAL: 348 MS
QTC INTERVAL: 437 MS
RIGHT ATRIAL 2D VOLUME: 23 ML
RIGHT ATRIUM AREA SYSTOLE A4C: 12.2 CM2
RIGHT VENTRICLE ID DIMENSION: 2.4 CM
SL CV LV EF: 60
SL CV PED ECHO LEFT VENTRICLE DIASTOLIC VOLUME (MOD BIPLANE) 2D: 112 ML
SL CV PED ECHO LEFT VENTRICLE SYSTOLIC VOLUME (MOD BIPLANE) 2D: 50 ML
T WAVE AXIS: 17 DEGREES
TR MAX PG: 6 MMHG
TR PEAK VELOCITY: 1.2 M/S
TRICUSPID VALVE PEAK E WAVE VELOCITY: 0.22 M/S
TRICUSPID VALVE PEAK REGURGITATION VELOCITY: 1.22 M/S
VENTRICULAR RATE: 95 BPM

## 2022-07-06 PROCEDURE — 93005 ELECTROCARDIOGRAM TRACING: CPT

## 2022-07-06 PROCEDURE — 93010 ELECTROCARDIOGRAM REPORT: CPT | Performed by: INTERNAL MEDICINE

## 2022-07-06 PROCEDURE — 93306 TTE W/DOPPLER COMPLETE: CPT

## 2022-07-06 PROCEDURE — 93306 TTE W/DOPPLER COMPLETE: CPT | Performed by: INTERNAL MEDICINE

## 2022-07-07 ENCOUNTER — TELEPHONE (OUTPATIENT)
Dept: HEMATOLOGY ONCOLOGY | Facility: CLINIC | Age: 47
End: 2022-07-07

## 2022-07-07 NOTE — TELEPHONE ENCOUNTER
----- Message from [facility] sent at 7/7/2022  7:57 AM EDT -----  Please let patient know his echo is normal  He has f/u scheduled in October

## 2022-07-07 NOTE — TELEPHONE ENCOUNTER
Called and spoke with patient  Discussed normal ECHO results  All questions answered and patient verbalized understanding  He knows to call the office with any questions or concerns prior to follow up in October

## 2022-07-16 DIAGNOSIS — I10 PRIMARY HYPERTENSION: ICD-10-CM

## 2022-07-18 RX ORDER — LISINOPRIL 20 MG/1
20 TABLET ORAL DAILY
Qty: 30 TABLET | Refills: 0 | Status: SHIPPED | OUTPATIENT
Start: 2022-07-18 | End: 2022-08-16 | Stop reason: SDUPTHER

## 2022-08-16 DIAGNOSIS — I10 PRIMARY HYPERTENSION: ICD-10-CM

## 2022-08-16 RX ORDER — LISINOPRIL 20 MG/1
20 TABLET ORAL DAILY
Qty: 30 TABLET | Refills: 0 | Status: SHIPPED | OUTPATIENT
Start: 2022-08-16 | End: 2022-09-14 | Stop reason: SDUPTHER

## 2022-08-29 ENCOUNTER — TELEPHONE (OUTPATIENT)
Dept: OTHER | Facility: OTHER | Age: 47
End: 2022-08-29

## 2022-08-29 NOTE — TELEPHONE ENCOUNTER
Patient has an appt on 9/16/22 and has to work; he would like to r/s it for 9/29/22 if possible  Please call him to r/s

## 2022-09-14 ENCOUNTER — APPOINTMENT (OUTPATIENT)
Dept: LAB | Facility: HOSPITAL | Age: 47
End: 2022-09-14
Payer: COMMERCIAL

## 2022-09-14 DIAGNOSIS — E78.00 PURE HYPERCHOLESTEROLEMIA: ICD-10-CM

## 2022-09-14 DIAGNOSIS — C43.59 MALIGNANT MELANOMA OF UPPER BACK (HCC): ICD-10-CM

## 2022-09-14 DIAGNOSIS — R73.03 PRE-DIABETES: ICD-10-CM

## 2022-09-14 DIAGNOSIS — I10 PRIMARY HYPERTENSION: ICD-10-CM

## 2022-09-14 DIAGNOSIS — Z79.899 HIGH RISK MEDICATION USE: ICD-10-CM

## 2022-09-14 LAB
CHOLEST SERPL-MCNC: 220 MG/DL
HDLC SERPL-MCNC: 36 MG/DL
LDLC SERPL CALC-MCNC: 143 MG/DL (ref 0–100)
NONHDLC SERPL-MCNC: 184 MG/DL
TRIGL SERPL-MCNC: 207 MG/DL

## 2022-09-14 PROCEDURE — 83615 LACTATE (LD) (LDH) ENZYME: CPT

## 2022-09-14 PROCEDURE — 83036 HEMOGLOBIN GLYCOSYLATED A1C: CPT

## 2022-09-14 PROCEDURE — 83625 ASSAY OF LDH ENZYMES: CPT

## 2022-09-14 PROCEDURE — 80061 LIPID PANEL: CPT

## 2022-09-15 LAB
EST. AVERAGE GLUCOSE BLD GHB EST-MCNC: 120 MG/DL
HBA1C MFR BLD: 5.8 %
LDH SERPL-CCNC: 162 IU/L (ref 121–224)
LDH1 CFR SERPL ELPH: 20 % (ref 17–32)
LDH2 CFR SERPL ELPH: 31 % (ref 25–40)
LDH3 CFR SERPL ELPH: 24 % (ref 17–27)
LDH4 CFR SERPL ELPH: 11 % (ref 5–13)
LDH5 CFR SERPL ELPH: 14 % (ref 4–20)

## 2022-09-19 ENCOUNTER — HOSPITAL ENCOUNTER (OUTPATIENT)
Dept: CT IMAGING | Facility: HOSPITAL | Age: 47
Discharge: HOME | End: 2022-09-19
Payer: COMMERCIAL

## 2022-09-19 DIAGNOSIS — C43.59 MALIGNANT MELANOMA OF UPPER BACK (HCC): ICD-10-CM

## 2022-09-19 PROCEDURE — G1004 CDSM NDSC: HCPCS

## 2022-09-19 PROCEDURE — 74177 CT ABD & PELVIS W/CONTRAST: CPT

## 2022-09-19 PROCEDURE — 71260 CT THORAX DX C+: CPT

## 2022-09-19 RX ADMIN — IOHEXOL 100 ML: 350 INJECTION, SOLUTION INTRAVENOUS at 07:50

## 2022-09-23 ENCOUNTER — TELEPHONE (OUTPATIENT)
Dept: HEMATOLOGY ONCOLOGY | Facility: CLINIC | Age: 47
End: 2022-09-23

## 2022-09-23 ENCOUNTER — OFFICE VISIT (OUTPATIENT)
Dept: SURGICAL ONCOLOGY | Facility: CLINIC | Age: 47
End: 2022-09-23
Payer: COMMERCIAL

## 2022-09-23 VITALS
SYSTOLIC BLOOD PRESSURE: 140 MMHG | DIASTOLIC BLOOD PRESSURE: 80 MMHG | OXYGEN SATURATION: 98 % | HEART RATE: 93 BPM | WEIGHT: 215 LBS | BODY MASS INDEX: 32.58 KG/M2 | RESPIRATION RATE: 16 BRPM | TEMPERATURE: 98 F | HEIGHT: 68 IN

## 2022-09-23 DIAGNOSIS — C77.9 MELANOMA METASTATIC TO LYMPH NODE (HCC): ICD-10-CM

## 2022-09-23 DIAGNOSIS — C43.9 MELANOMA METASTATIC TO LYMPH NODE (HCC): ICD-10-CM

## 2022-09-23 DIAGNOSIS — C43.59 MALIGNANT MELANOMA OF UPPER BACK (HCC): Primary | ICD-10-CM

## 2022-09-23 PROCEDURE — 99214 OFFICE O/P EST MOD 30 MIN: CPT | Performed by: SURGERY

## 2022-09-23 NOTE — PROGRESS NOTES
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 [facility])        Advance Care Planning/Advance Directives:  Discussed disease status, cancer treatment plans and/or cancer treatment goals with the patient  Oncology History   Malignant melanoma of upper back ([address] )   1/4/2020 Biopsy    Left upper back shave biopsy:  - Malignant melanoma 2 3mm    Mitosis: 6  Ulceration: not identified     2/2/2021 Surgery    Wide excision with sentinel lymph node biopsy  Lymph Node, Branscomb, number 1, biopsy:  - Metastatic melanoma in one lymph node (1/1, larger focus approximately 1 3 mm; subcapsular and intraparenchyma), no extranodal extension      Skin, left back, excision:  - Residual melanoma, 1 3 mm; prior biopsy site reaction   - Inked margins with no tumor seen  Lymph Node, Branscomb, sentinel node #2, biopsy:  - Metastatic melanoma in one of ten lymph nodes (1/10, single and small nests of cells, subcapsular, larger focus 0 11 mm); no extranodal extension  2/2/2021 -  Cancer Staged    Staging form: Melanoma of the Skin, AJCC 8th Edition  - Pathologic stage from 2/2/2021: Stage IIIB (pT3a, pN2a, cM0) - Signed by Alyssia Patel MD on 2/4/2022  Stage prefix: Initial diagnosis       4/8/2021 Genomic Testing    Decision DX: Class 1B     4/2021 -  Chemotherapy     Tafinalar 150 mg b i d  Mekinist 2 mg p o   Daily         History of Present Illness:  64-year-old man with history of stage III melanoma here for surveillance visit  No new complaints to report  -Interval History:  Recent blood work and imaging studies completed  Review of Systems:  Review of Systems   Constitutional: Negative  HENT: Negative  Eyes: Negative  Respiratory: Negative  Cardiovascular: Negative  Gastrointestinal: Negative  Endocrine: Negative  Genitourinary: Negative  Musculoskeletal: Negative  Skin: Negative  Allergic/Immunologic: Negative  Neurological: Negative  Hematological: Negative  Psychiatric/Behavioral: Negative  Patient Active Problem List   Diagnosis    Malignant melanoma of upper back [address] [address] )    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 ([facility] [address] )    Primary hypertension     No past medical history on file  Past Surgical History:   Procedure Laterality Date    LYMPH NODE BIOPSY N/A 2/2/2021    Procedure: BIOPSY LYMPH NODE LEFT AXILLARY SENTINEL;  Surgeon: Lola Andrade MD;  [facility];  Service: Surgical Oncology    NO PAST SURGERIES      SKIN LESION EXCISION Left 2/2/2021    Procedure: WIDE EXCISION MELANOMA SCAR UPPER BACK, intraoperative lymphatic mapping, sentinel lymph node biopsy; [address];  Surgeon: Lola Andrade MD;  Location: [facility];  Service: Surgical Oncology     Family History   Problem Relation Age of Onset    Basal cell carcinoma Father     No Known Problems Mother     Breast cancer Paternal Aunt 43    Breast cancer Paternal Grandmother [de-identified]     Social History     Socioeconomic History    Marital status: /Civil Union     Spouse name: Not on file    Number of children: Not on file    Years of education: Not on file    Highest education level: Not on file   Occupational History    Not on file   Tobacco Use    Smoking status: Never Smoker    Smokeless tobacco: Never Used   Vaping Use    Vaping Use: Never used   Substance and Sexual Activity    Alcohol use:  Yes    Drug use: Never    Sexual activity: Not on file   Other Topics Concern    Not on file   Social History Narrative    Not on file     Social Determinants of Health     Financial Resource Strain: Not on file   Food Insecurity: Not on file   Transportation Needs: Not on file   Physical Activity: Not on file   Stress: Not on file   Social Connections: Not on file   Intimate Partner Violence: Not on file   Housing Stability: Not on file       Current Outpatient Medications:     lisinopril (ZESTRIL) 20 mg tablet, Take 1 tablet (20 mg total) by mouth daily, Disp: 30 tablet, Rfl: 0    amLODIPine (NORVASC) 5 mg tablet, Take 1 tablet (5 mg total) by mouth daily (Patient not taking: No sig reported), Disp: 30 tablet, Rfl: 3  No Known Allergies  Vitals:    09/23/22 1025   BP: 140/80   Pulse: 93   Resp: 16   Temp: 98 °F (36 7 °C)   SpO2: 98%       Physical Exam  Vitals reviewed  Constitutional:       Appearance: Normal appearance  HENT:      Head: Normocephalic and atraumatic  Right Ear: External ear normal       Left Ear: External ear normal       Nose: Nose normal    Eyes:      Extraocular Movements: Extraocular movements intact  Pupils: Pupils are equal, round, and reactive to light  Cardiovascular:      Rate and Rhythm: Normal rate and regular rhythm  Heart sounds: Normal heart sounds  Pulmonary:      Effort: Pulmonary effort is normal       Breath sounds: Normal breath sounds  Abdominal:      General: Abdomen is flat  Palpations: Abdomen is soft  Musculoskeletal:         General: Normal range of motion  Cervical back: Normal range of motion and neck supple  Lymphadenopathy:      Cervical: No cervical adenopathy  Skin:     General: Skin is warm and dry  Comments: Well-healed back excision site without evidence of recurrence visible or palpable  Negative for cervical or axillary adenopathy  Neurological:      General: No focal deficit present  Mental Status: He is alert and oriented to person, place, and time     Psychiatric: Mood and Affect: Mood normal          Behavior: Behavior normal          Thought Content: Thought content normal          Judgment: Judgment normal            Results:  Labs:  Component Ref Range & Units 9/14/22  7:23 AM 2/26/21  2:03 PM    - 224 IU/L 162  200    LD-1 17 - 32 % 20  21    LD-2 25 - 40 % 31  32    LD-3 17 - 27 % 24  22    LD-4 5 - 13 % 11  10    LD-5 4 - 20 % 14  15          Imaging  CT chest abdomen pelvis w contrast    Result Date: 9/21/2022  Narrative: CT CHEST, ABDOMEN AND PELVIS WITH IV CONTRAST INDICATION:   C43 59: Malignant melanoma of other part of trunk  COMPARISON:  1/24/2022; PET study from 11/5/2021; 10/19/2021; 2/26/2021 TECHNIQUE: CT examination of the chest, abdomen and pelvis was performed  Axial, sagittal, and coronal 2D reformatted images were created from the source data and submitted for interpretation  Radiation dose length product (DLP) for this visit:  2088  22 mGy-cm   This examination, like all CT scans performed in the [facility], was performed utilizing techniques to minimize radiation dose exposure, including the use of iterative reconstruction and automated exposure control  IV Contrast:  100 mL of iohexol (OMNIPAQUE) Enteric Contrast: Enteric contrast was administered  FINDINGS: CHEST LUNGS:  Peripheral groundglass opacities have slightly further improved in the lungs  Some opacities do remain  There is no tracheal or endobronchial lesion  PLEURA:  Unremarkable  HEART/GREAT VESSELS: Heart is unremarkable for patient's age  No thoracic aortic aneurysm  MEDIASTINUM AND MAGALYS:  Unremarkable  CHEST WALL AND LOWER NECK:  There is scarring in the left paramedian upper back soft tissues centered on image 9 and similar to the prior study  This is consistent with prior surgery /posttreatment change and was mildly PET avid in November 2021  No new nodularity can be appreciated  Surgical clips in the left axilla are present   ABDOMEN LIVER/BILIARY TREE: Small circumscribed hypodensity is noted in segment 4A measuring 1 2 cm  This measured approximately 9 mm in January and 7 mm in 2021  This was not PET avid  GALLBLADDER:  No calcified gallstones  No pericholecystic inflammatory change  SPLEEN:  Unremarkable  PANCREAS:  There is no pancreatic duct dilatation or focal pancreatic abnormality  Some peripancreatic infiltration was noted as well as mild increased activity on the prior CT of October 2021 subsequently PET study although follow-up CT in January was also unremarkable  ADRENAL GLANDS:  Unremarkable  KIDNEYS/URETERS:  Left renal cyst appears unchanged  STOMACH AND BOWEL:  No evidence of obstruction  Left colon diverticulosis without CT evidence of diverticulitis  APPENDIX:  No findings to suggest appendicitis  ABDOMINOPELVIC CAVITY:  No ascites  No pneumoperitoneum  No lymphadenopathy  VESSELS:  Dual left renal arteries are noted as well as a single right renal artery  PELVIS REPRODUCTIVE ORGANS:  Unremarkable for patient's age  Small amount of fluid is seen within the right inguinal canal may represent a small hydrocele  URINARY BLADDER: The bladder is not well distended  Some wall thickening could be present  ABDOMINAL WALL/INGUINAL REGIONS:  Small periumbilical hernia of fat is identified  OSSEOUS STRUCTURES:  No acute fracture or destructive osseous lesion  Impression: No evidence of adenopathy  Groundglass opacities in the lung are mildly improved and only faintly visible  Peripancreatic and infiltration noted on the study of October 2021 appears to have resolved  This may have been related to pancreatitis and should be correlated clinically  If there is persistent pancreatic enzyme abnormality or high suspicion, MRI may  be helpful Circumscribed hepatic lesion has Hounsfield density suggesting a cyst although slightly larger than prior exams  Continued follow-up is advised   Workstation performed: [id number]     I reviewed the above laboratory and imaging data  Discussion/Summary: Stage 3 melanoma left upper back  No evidence of recurrence clinically and radiographically  Plan of follow-up in 6 months with repeat blood work and ultrasound axilla per MSLT2 protocol  Continue surveillance with Dr Lewis Solorio as well

## 2022-09-23 NOTE — TELEPHONE ENCOUNTER
Procedure scheduling will reach out to patient directly to schedule stat us lower extremeltiy   Patient will call to schedule us in 6 months

## 2022-09-28 ENCOUNTER — OFFICE VISIT (OUTPATIENT)
Dept: FAMILY MEDICINE CLINIC | Facility: CLINIC | Age: 47
End: 2022-09-28
Payer: COMMERCIAL

## 2022-09-28 VITALS
WEIGHT: 218 LBS | BODY MASS INDEX: 33.04 KG/M2 | HEART RATE: 91 BPM | DIASTOLIC BLOOD PRESSURE: 88 MMHG | TEMPERATURE: 98.4 F | HEIGHT: 68 IN | OXYGEN SATURATION: 98 % | SYSTOLIC BLOOD PRESSURE: 142 MMHG

## 2022-09-28 DIAGNOSIS — Z12.11 ENCOUNTER FOR SCREENING COLONOSCOPY: ICD-10-CM

## 2022-09-28 DIAGNOSIS — Z12.5 SCREENING FOR PROSTATE CANCER: ICD-10-CM

## 2022-09-28 DIAGNOSIS — E78.2 MIXED HYPERLIPIDEMIA: ICD-10-CM

## 2022-09-28 DIAGNOSIS — I10 PRIMARY HYPERTENSION: Primary | ICD-10-CM

## 2022-09-28 DIAGNOSIS — F41.9 ANXIETY: ICD-10-CM

## 2022-09-28 DIAGNOSIS — R73.01 IFG (IMPAIRED FASTING GLUCOSE): ICD-10-CM

## 2022-09-28 DIAGNOSIS — E78.1 HYPERTRIGLYCERIDEMIA: ICD-10-CM

## 2022-09-28 PROCEDURE — 99214 OFFICE O/P EST MOD 30 MIN: CPT | Performed by: FAMILY MEDICINE

## 2022-09-28 PROCEDURE — 3077F SYST BP >= 140 MM HG: CPT | Performed by: FAMILY MEDICINE

## 2022-09-28 PROCEDURE — 3725F SCREEN DEPRESSION PERFORMED: CPT | Performed by: FAMILY MEDICINE

## 2022-09-28 PROCEDURE — 3079F DIAST BP 80-89 MM HG: CPT | Performed by: FAMILY MEDICINE

## 2022-09-28 NOTE — PROGRESS NOTES
Assessment/Plan:    No problem-specific Assessment & Plan notes found for this encounter  Diagnoses and all orders for this visit:    Primary hypertension  Comments:  re-start amlodipine  Orders:  -     CBC and differential; Future  -     Comprehensive metabolic panel; Future  -     TSH, 3rd generation with Free T4 reflex; Future    Anxiety  Comments: Will start Zoloft 50 mg daily, recheck in 6 weeks  Orders:  -     TSH, 3rd generation with Free T4 reflex; Future  -     sertraline (Zoloft) 50 mg tablet; Take 1 tablet (50 mg total) by mouth daily    Mixed hyperlipidemia  Comments:  Counseled on dietary modifications and regular aerobic exercise  Orders:  -     CBC and differential; Future  -     Comprehensive metabolic panel; Future  -     Lipid panel; Future  -     TSH, 3rd generation with Free T4 reflex; Future    IFG (impaired fasting glucose)  Comments:  Blood sugar elevated at 114 but hemoglobin A1c only 5 8 will continue to monitor  Orders:  -     HEMOGLOBIN A1C W/ EAG ESTIMATION; Future    Hypertriglyceridemia  Comments:  Counseled on dietary modifications and regular aerobic exercise  Orders:  -     CBC and differential; Future  -     Comprehensive metabolic panel; Future  -     Lipid panel; Future    Encounter for screening colonoscopy  -     Ambulatory referral for colonoscopy; Future    Screening for prostate cancer  -     PSA, Total Screen; Future          PHQ-2/9 Depression Screening    Little interest or pleasure in doing things: 0 - not at all  Feeling down, depressed, or hopeless: 0 - not at all  PHQ-2 Score: 0  PHQ-2 Interpretation: Negative depression screen            Subjective:      Patient ID: Aziza Mistry is a 50 y o  male  Patient presents for six-month follow-up of his chronic conditions and lab review, he also reports anxiety  He also notes that his blood pressure is always borderline high, he had been on amlodipine in addition to lisinopril but had stopped the amlodipine    I reviewed his labs with him, his CBC and CMP are within normal limits, fasting blood sugar is elevated at 114 however A1c is only 5 8  Total cholesterol is 220, triglycerides 207 this is increased from 103 last time, HDL low at 36 and   He has a family hx of anxiety in mother and sister, he notes he is anxious and he flys off the handle, he gets irritable  The following portions of the patient's history were reviewed and updated as appropriate: allergies, current medications, past family history, past medical history, past social history, past surgical history and problem list     Review of Systems   Constitutional: Negative  Negative for chills, fatigue and fever  HENT: Negative  Eyes: Negative  Negative for visual disturbance  Respiratory: Negative for cough, chest tightness, shortness of breath and wheezing  Cardiovascular: Negative for chest pain, palpitations and leg swelling  Gastrointestinal: Negative for abdominal pain, blood in stool, constipation, diarrhea, nausea and vomiting  Endocrine: Negative  Genitourinary: Negative for difficulty urinating, dysuria, frequency, hematuria and urgency  Musculoskeletal: Negative for arthralgias, joint swelling and myalgias  Skin: Negative  Allergic/Immunologic: Negative  Neurological: Negative for dizziness, seizures, syncope and light-headedness  Hematological: Negative for adenopathy  Psychiatric/Behavioral: Negative for dysphoric mood  The patient is nervous/anxious  Objective:    /88 (BP Location: Left arm, Patient Position: Sitting, Cuff Size: Large)   Pulse 91   Temp 98 4 °F (36 9 °C) (Tympanic)   Ht 5' 8" (1 727 m)   Wt 98 9 kg (218 lb)   SpO2 98%   BMI 33 15 kg/m²      Physical Exam  Vitals and nursing note reviewed  Constitutional:       General: He is not in acute distress  Appearance: Normal appearance  He is not ill-appearing or diaphoretic     HENT:      Head: Normocephalic and atraumatic  Eyes:      Conjunctiva/sclera: Conjunctivae normal    Cardiovascular:      Rate and Rhythm: Normal rate and regular rhythm  Heart sounds: Normal heart sounds  No murmur heard  Pulmonary:      Effort: Pulmonary effort is normal  No respiratory distress  Breath sounds: Normal breath sounds  No wheezing, rhonchi or rales  Abdominal:      General: There is no distension  Palpations: Abdomen is soft  There is no mass  Tenderness: There is no abdominal tenderness  There is no guarding or rebound  Musculoskeletal:      Cervical back: Normal range of motion and neck supple  No rigidity  Right lower leg: No edema  Left lower leg: No edema  Lymphadenopathy:      Cervical: No cervical adenopathy  Neurological:      Mental Status: He is alert and oriented to person, place, and time  Psychiatric:         Mood and Affect: Mood normal          Behavior: Behavior normal          Thought Content:  Thought content normal          Judgment: Judgment normal

## 2022-10-07 ENCOUNTER — HOSPITAL ENCOUNTER (OUTPATIENT)
Dept: NON INVASIVE DIAGNOSTICS | Facility: HOSPITAL | Age: 47
Discharge: HOME | End: 2022-10-07
Attending: INTERNAL MEDICINE
Payer: COMMERCIAL

## 2022-10-07 VITALS
DIASTOLIC BLOOD PRESSURE: 88 MMHG | SYSTOLIC BLOOD PRESSURE: 142 MMHG | HEART RATE: 91 BPM | WEIGHT: 218 LBS | HEIGHT: 68 IN | BODY MASS INDEX: 33.04 KG/M2

## 2022-10-07 DIAGNOSIS — Z79.899 HIGH RISK MEDICATION USE: ICD-10-CM

## 2022-10-07 DIAGNOSIS — C43.59 MALIGNANT MELANOMA OF UPPER BACK (HCC): ICD-10-CM

## 2022-10-07 LAB
AORTIC ROOT: 3.3 CM
APICAL FOUR CHAMBER EJECTION FRACTION: 70 %
ASCENDING AORTA: 3.1 CM
E WAVE DECELERATION TIME: 211 MS
FRACTIONAL SHORTENING: 36 % (ref 28–44)
INTERVENTRICULAR SEPTUM IN DIASTOLE (PARASTERNAL SHORT AXIS VIEW): 1 CM
INTERVENTRICULAR SEPTUM: 1 CM (ref 0.6–1.1)
LAAS-AP2: 13.9 CM2
LAAS-AP4: 11.8 CM2
LEFT ATRIUM SIZE: 2.9 CM
LEFT INTERNAL DIMENSION IN SYSTOLE: 2.8 CM (ref 2.1–4)
LEFT VENTRICULAR INTERNAL DIMENSION IN DIASTOLE: 4.4 CM (ref 3.5–6)
LEFT VENTRICULAR POSTERIOR WALL IN END DIASTOLE: 0.8 CM
LEFT VENTRICULAR STROKE VOLUME: 58 ML
LVSV (TEICH): 58 ML
MV E'TISSUE VEL-SEP: 10 CM/S
MV PEAK A VEL: 0.9 M/S
MV PEAK E VEL: 79 CM/S
MV STENOSIS PRESSURE HALF TIME: 61 MS
MV VALVE AREA P 1/2 METHOD: 3.61 CM2
RIGHT ATRIUM AREA SYSTOLE A4C: 13.5 CM2
RIGHT VENTRICLE ID DIMENSION: 2.6 CM
SL CV LEFT ATRIUM LENGTH A2C: 3.7 CM
SL CV LV EF: 70
SL CV PED ECHO LEFT VENTRICLE DIASTOLIC VOLUME (MOD BIPLANE) 2D: 87 ML
SL CV PED ECHO LEFT VENTRICLE SYSTOLIC VOLUME (MOD BIPLANE) 2D: 29 ML

## 2022-10-07 PROCEDURE — 93306 TTE W/DOPPLER COMPLETE: CPT

## 2022-10-07 PROCEDURE — 93306 TTE W/DOPPLER COMPLETE: CPT | Performed by: INTERNAL MEDICINE

## 2022-10-11 ENCOUNTER — TELEPHONE (OUTPATIENT)
Dept: HEMATOLOGY ONCOLOGY | Facility: CLINIC | Age: 47
End: 2022-10-11

## 2022-10-11 NOTE — TELEPHONE ENCOUNTER
Patient's labs are outdated, called to remind him to repeat labs prior to appointment on 10/12  Patient stated that he was unable to do so and requested that his appointment be rescheduled for after his return from vacation  New appointment date of 11/14/2022 with labs 1 week prior  Patient verbalized understanding and agreement

## 2022-10-17 ENCOUNTER — HOSPITAL ENCOUNTER (OUTPATIENT)
Dept: RADIOLOGY | Facility: HOSPITAL | Age: 47
Discharge: HOME | End: 2022-10-17
Attending: SURGERY
Payer: COMMERCIAL

## 2022-10-17 DIAGNOSIS — I10 PRIMARY HYPERTENSION: ICD-10-CM

## 2022-10-17 DIAGNOSIS — C43.59 MALIGNANT MELANOMA OF UPPER BACK (HCC): ICD-10-CM

## 2022-10-17 DIAGNOSIS — C43.9 MELANOMA METASTATIC TO LYMPH NODE (HCC): ICD-10-CM

## 2022-10-17 DIAGNOSIS — C77.9 MELANOMA METASTATIC TO LYMPH NODE (HCC): ICD-10-CM

## 2022-10-17 PROCEDURE — 76882 US LMTD JT/FCL EVL NVASC XTR: CPT

## 2022-10-17 RX ORDER — LISINOPRIL 20 MG/1
20 TABLET ORAL DAILY
Qty: 30 TABLET | Refills: 0 | Status: SHIPPED | OUTPATIENT
Start: 2022-10-17

## 2022-10-21 DIAGNOSIS — F41.9 ANXIETY: ICD-10-CM

## 2022-11-07 ENCOUNTER — TELEPHONE (OUTPATIENT)
Dept: HEMATOLOGY ONCOLOGY | Facility: CLINIC | Age: 47
End: 2022-11-07

## 2022-11-07 NOTE — TELEPHONE ENCOUNTER
Appointment Cancellation Or Reschedule     Person calling in Patient    If other than patient calling, are they listed on the communication consent form? NA   Provider Dr Lazarus Robles   Office Visit Date and Time 11/14/22 @ 8:20am   Office Visit Location [place]   Did patient want to reschedule their office appointment? If so, when was it scheduled to? Yes 12/7/22 @ 2:40pm   Did you have STAR scheduled for this appointment? No   Do you need STAR set up for your new appointment? If yes, please send to "PATIENT RIDESHARE" pool for STAR rescheduling No   If you are cancelling appointment, can we notify STAR to cancel ride? If yes, please send to "PATIENT RIDESHARE" pool for STAR to cancel service NA   Is this patient calling to reschedule an infusion appointment? No   When is their next infusion appointment? NA   Is this patient a Chemo patient? No   Reason for Cancellation or Reschedule Patients work schedule has changed  If the patient is a treatment patient, please route this to the office nurse  If the patient is not on treatment, please route to the office MA  If the patient is a surgical oncology patient, please route to surg/onc clinical pool

## 2022-11-09 ENCOUNTER — OFFICE VISIT (OUTPATIENT)
Dept: FAMILY MEDICINE CLINIC | Facility: CLINIC | Age: 47
End: 2022-11-09

## 2022-11-09 VITALS
WEIGHT: 206 LBS | HEIGHT: 68 IN | OXYGEN SATURATION: 98 % | DIASTOLIC BLOOD PRESSURE: 80 MMHG | TEMPERATURE: 96.9 F | HEART RATE: 78 BPM | SYSTOLIC BLOOD PRESSURE: 118 MMHG | BODY MASS INDEX: 31.22 KG/M2

## 2022-11-09 DIAGNOSIS — I10 PRIMARY HYPERTENSION: ICD-10-CM

## 2022-11-09 DIAGNOSIS — C43.9 MELANOMA METASTATIC TO LYMPH NODE (HCC): ICD-10-CM

## 2022-11-09 DIAGNOSIS — F41.9 ANXIETY: Primary | ICD-10-CM

## 2022-11-09 DIAGNOSIS — Z23 ENCOUNTER FOR IMMUNIZATION: ICD-10-CM

## 2022-11-09 DIAGNOSIS — C77.9 MELANOMA METASTATIC TO LYMPH NODE (HCC): ICD-10-CM

## 2022-11-09 NOTE — PROGRESS NOTES
Name: Lee Swartz      : 1975      MRN: [id number]  Encounter Provider: Miguel Chacon MD  Encounter Date: 2022   Encounter department: [address] [place] [address]     1  Anxiety    2  Encounter for immunization  -     influenza vaccine, quadrivalent, 0 5 mL, preservative-free, for adult and pediatric patients 6 mos+ (AFLURIA, FLUARIX, FLULAVAL, FLUZONE)    3  Primary hypertension    4  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 ([facility] [address] )        Depression Screening and Follow-up Plan: Patient was screened for depression during today's encounter  They screened negative with a PHQ-2 score of 0  Patient is doing very well on Zoloft 50 mg  Will continue to monitor and will RTC in 2023 for next annual physical with lab review  Recommended low-salt diet to help with HTN  Subjective      Patient is a 26-year-old male presenting to the clinic for a follow-up for his anxiety  On last visit patient was started on Zoloft 50 mg  Today patient notes that he is feeling very well on Zoloft and his anxiety and his stress levels are very much improved, allowing him to focus more on daily activities  Patient denies any depressive episodes at this time and denies suicidal or homicidal ideations  Patient notes that his blood pressure at home ranges from around 125-130/70s and is managed well on his lisinopril 20 mg and amlodipine 5 mg  Patient also has a follow up visit with his Heme/Onc for his melanoma in 2022  Review of Systems   Constitutional: Negative for activity change, appetite change, chills and fever  HENT: Negative for ear pain, rhinorrhea, sinus pain and sore throat  Eyes: Negative for pain and visual disturbance  Respiratory: Negative for cough and shortness of breath  Cardiovascular: Negative for chest pain and palpitations  Gastrointestinal: Negative for abdominal pain and vomiting  Genitourinary: Negative for dysuria and hematuria  Musculoskeletal: Negative for arthralgias and back pain  Skin: Negative for color change and rash  Neurological: Negative for dizziness, seizures, syncope, light-headedness and headaches  Psychiatric/Behavioral: Negative for agitation, behavioral problems and decreased concentration  The patient is not nervous/anxious and is not hyperactive  Current Outpatient Medications on File Prior to Visit   Medication Sig   • lisinopril (ZESTRIL) 20 mg tablet Take 1 tablet (20 mg total) by mouth daily   • sertraline (Zoloft) 50 mg tablet Take 1 tablet (50 mg total) by mouth daily   • amLODIPine (NORVASC) 5 mg tablet Take 1 tablet (5 mg total) by mouth daily (Patient not taking: Reported on 9/28/2022)       Objective     /80 (BP Location: Left arm, Patient Position: Sitting, Cuff Size: Large)   Pulse 78   Temp (!) 96 9 °F (36 1 °C) (Tympanic)   Ht 5' 8" (1 727 m)   Wt 93 4 kg (206 lb)   SpO2 98%   BMI 31 32 kg/m²     Physical Exam  Vitals reviewed  Constitutional:       General: He is not in acute distress  Appearance: Normal appearance  HENT:      Head: Normocephalic  Nose: Nose normal    Eyes:      General: No scleral icterus  Extraocular Movements: Extraocular movements intact  Pupils: Pupils are equal, round, and reactive to light  Cardiovascular:      Rate and Rhythm: Normal rate and regular rhythm  Pulses: Normal pulses  Heart sounds: Normal heart sounds  No murmur heard  Pulmonary:      Effort: Pulmonary effort is normal  No respiratory distress  Breath sounds: Normal breath sounds  No wheezing  Chest:      Chest wall: No tenderness  Abdominal:      Palpations: Abdomen is soft  Musculoskeletal:         General: No swelling  Normal range of motion  Right lower leg: No edema  Left lower leg: No edema  Skin:     Coloration: Skin is not jaundiced  Neurological:      General: No focal deficit present        Mental Status: He is alert and oriented to person, place, and time  Psychiatric:         Mood and Affect: Mood normal          Behavior: Behavior normal          Thought Content:  Thought content normal          Judgment: Judgment normal        Faheem Ackerman MD

## 2022-11-14 DIAGNOSIS — F41.9 ANXIETY: ICD-10-CM

## 2022-11-14 DIAGNOSIS — I10 PRIMARY HYPERTENSION: ICD-10-CM

## 2022-11-14 RX ORDER — LISINOPRIL 20 MG/1
20 TABLET ORAL DAILY
Qty: 30 TABLET | Refills: 2 | Status: SHIPPED | OUTPATIENT
Start: 2022-11-14

## 2022-11-29 ENCOUNTER — TELEPHONE (OUTPATIENT)
Dept: HEMATOLOGY ONCOLOGY | Facility: CLINIC | Age: 47
End: 2022-11-29

## 2022-11-29 NOTE — TELEPHONE ENCOUNTER
Scheduling Appointment SEND TO [facility]    Who Is Calling to Schedule Patient    Doctor Dr John Nieves   Date and Time 01/04 at 1:20pm   Reason for scheduling appointment Follow up    Patient verbalized understanding

## 2023-01-04 ENCOUNTER — OFFICE VISIT (OUTPATIENT)
Dept: HEMATOLOGY ONCOLOGY | Facility: CLINIC | Age: 48
End: 2023-01-04

## 2023-01-04 ENCOUNTER — APPOINTMENT (OUTPATIENT)
Dept: LAB | Facility: HOSPITAL | Age: 48
End: 2023-01-04

## 2023-01-04 VITALS
HEIGHT: 68 IN | OXYGEN SATURATION: 98 % | TEMPERATURE: 98 F | HEART RATE: 94 BPM | DIASTOLIC BLOOD PRESSURE: 90 MMHG | BODY MASS INDEX: 32.43 KG/M2 | RESPIRATION RATE: 14 BRPM | SYSTOLIC BLOOD PRESSURE: 150 MMHG | WEIGHT: 214 LBS

## 2023-01-04 DIAGNOSIS — C77.9 MELANOMA METASTATIC TO LYMPH NODE (HCC): ICD-10-CM

## 2023-01-04 DIAGNOSIS — C43.59 MALIGNANT MELANOMA OF UPPER BACK (HCC): ICD-10-CM

## 2023-01-04 DIAGNOSIS — Z79.899 HIGH RISK MEDICATION USE: ICD-10-CM

## 2023-01-04 DIAGNOSIS — C43.9 MELANOMA METASTATIC TO LYMPH NODE (HCC): ICD-10-CM

## 2023-01-04 DIAGNOSIS — C43.59 MALIGNANT MELANOMA OF UPPER BACK (HCC): Primary | ICD-10-CM

## 2023-01-04 DIAGNOSIS — Z79.899 HIGH RISK MEDICATIONS (NOT ANTICOAGULANTS) LONG-TERM USE: ICD-10-CM

## 2023-01-04 LAB
ALBUMIN SERPL BCP-MCNC: 4.3 G/DL (ref 3.5–5)
ALP SERPL-CCNC: 52 U/L (ref 34–104)
ALT SERPL W P-5'-P-CCNC: 39 U/L (ref 7–52)
ANION GAP SERPL CALCULATED.3IONS-SCNC: 8 MMOL/L (ref 4–13)
AST SERPL W P-5'-P-CCNC: 30 U/L (ref 13–39)
BASOPHILS # BLD AUTO: 0.02 THOUSANDS/ÂΜL (ref 0–0.1)
BASOPHILS NFR BLD AUTO: 0 % (ref 0–1)
BILIRUB SERPL-MCNC: 0.64 MG/DL (ref 0.2–1)
BUN SERPL-MCNC: 20 MG/DL (ref 5–25)
CALCIUM SERPL-MCNC: 9.2 MG/DL (ref 8.4–10.2)
CHLORIDE SERPL-SCNC: 100 MMOL/L (ref 96–108)
CO2 SERPL-SCNC: 26 MMOL/L (ref 21–32)
CREAT SERPL-MCNC: 1.29 MG/DL (ref 0.6–1.3)
EOSINOPHIL # BLD AUTO: 0.11 THOUSAND/ÂΜL (ref 0–0.61)
EOSINOPHIL NFR BLD AUTO: 2 % (ref 0–6)
ERYTHROCYTE [DISTWIDTH] IN BLOOD BY AUTOMATED COUNT: 13.7 % (ref 11.6–15.1)
GFR SERPL CREATININE-BSD FRML MDRD: 64 ML/MIN/1.73SQ M
GLUCOSE P FAST SERPL-MCNC: 101 MG/DL (ref 65–99)
HCT VFR BLD AUTO: 45.2 % (ref 36.5–49.3)
HGB BLD-MCNC: 14.6 G/DL (ref 12–17)
IMM GRANULOCYTES # BLD AUTO: 0.01 THOUSAND/UL (ref 0–0.2)
IMM GRANULOCYTES NFR BLD AUTO: 0 % (ref 0–2)
LDH SERPL-CCNC: 180 U/L (ref 140–271)
LYMPHOCYTES # BLD AUTO: 1.46 THOUSANDS/ÂΜL (ref 0.6–4.47)
LYMPHOCYTES NFR BLD AUTO: 28 % (ref 14–44)
MCH RBC QN AUTO: 31.3 PG (ref 26.8–34.3)
MCHC RBC AUTO-ENTMCNC: 32.3 G/DL (ref 31.4–37.4)
MCV RBC AUTO: 97 FL (ref 82–98)
MONOCYTES # BLD AUTO: 0.4 THOUSAND/ÂΜL (ref 0.17–1.22)
MONOCYTES NFR BLD AUTO: 8 % (ref 4–12)
NEUTROPHILS # BLD AUTO: 3.3 THOUSANDS/ÂΜL (ref 1.85–7.62)
NEUTS SEG NFR BLD AUTO: 62 % (ref 43–75)
NRBC BLD AUTO-RTO: 0 /100 WBCS
PLATELET # BLD AUTO: 161 THOUSANDS/UL (ref 149–390)
PMV BLD AUTO: 10.1 FL (ref 8.9–12.7)
POTASSIUM SERPL-SCNC: 3.9 MMOL/L (ref 3.5–5.3)
PROT SERPL-MCNC: 7.2 G/DL (ref 6.4–8.4)
RBC # BLD AUTO: 4.67 MILLION/UL (ref 3.88–5.62)
SODIUM SERPL-SCNC: 134 MMOL/L (ref 135–147)
WBC # BLD AUTO: 5.3 THOUSAND/UL (ref 4.31–10.16)

## 2023-01-04 PROCEDURE — 83615 LACTATE (LD) (LDH) ENZYME: CPT

## 2023-01-04 PROCEDURE — 80053 COMPREHEN METABOLIC PANEL: CPT

## 2023-01-04 PROCEDURE — 36415 COLL VENOUS BLD VENIPUNCTURE: CPT

## 2023-01-04 PROCEDURE — 85025 COMPLETE CBC W/AUTO DIFF WBC: CPT

## 2023-01-04 NOTE — LETTER
2023     Christy Gutierrez DO    Patient: Brittanie Hui   YOB: 1975   Date of Visit: 2023       Dear Dr Danii Farooq:    Thank you for referring Taylor Deleon to me for evaluation  Below are my notes for this consultation  If you have questions, please do not hesitate to call me  I look forward to following your patient along with you  Sincerely,        Manisha Carrillo MD        CC: MD Renee Motley CRNP Darlen Boop, MD  2023 12:27 PM  Sign when Signing Visit  [address] [place] [facility]  [phone]     Date of Visit: 2023  Name: Brittanie Hui   YOB: 1975        Subjective    VISIT DIAGNOSIS:  Diagnoses and all orders for this visit:    Malignant melanoma of upper back ([address] )  -     CT chest abdomen pelvis w contrast; Future  -     CBC and differential; Future  -     Comprehensive metabolic panel; Future  -     LD,Blood; Future    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 ([address] )  -     CT chest abdomen pelvis w contrast; Future  -     CBC and differential; Future  -     Comprehensive metabolic panel; Future  -     LD,Blood; Future    High risk medication use        Oncology History   Malignant melanoma of upper back ([address] )   2020 Biopsy    Left upper back shave biopsy:  - Malignant melanoma 2 3mm    Mitosis: 6  Ulceration: not identified     2021 Surgery    Wide excision with sentinel lymph node biopsy  Lymph Node, [place], number 1, biopsy:  - Metastatic melanoma in one lymph node (, larger focus approximately 1 3 mm; subcapsular and intraparenchyma), no extranodal extension      Skin, left back, excision:  - Residual melanoma, 1 3 mm; prior biopsy site reaction   - Inked margins with no tumor seen          Lymph Node, [place], sentinel node #2, biopsy:  - Metastatic melanoma in one of ten lymph nodes (10, single and small nests of cells, subcapsular, larger focus 0 11 mm); no extranodal extension  2/2/2021 -  Cancer Staged    Staging form: Melanoma of the Skin, AJCC 8th Edition  - Pathologic stage from 2/2/2021: Stage IIIB (pT3a, pN2a, cM0) - Signed by Benji Dale MD on 2/4/2022  Stage prefix: Initial diagnosis       4/8/2021 Genomic Testing    Decision DX: Class 1B     4/2021 - 4/2022 Chemotherapy     Tafinalar 150 mg b i d  Mekinist 2 mg p o  Daily        Cancer Staging   Malignant melanoma of upper back [facility])  Staging form: Melanoma of the Skin, AJCC 8th Edition  - Pathologic stage from 2/2/2021: Stage IIIB (pT3a, pN2a, cM0) - Signed by Benji Dale MD on 2/4/2022          HISTORY OF PRESENT ILLNESS: Nancy Hurt is a 50 y o  male  who has stage IIIb melanoma who received 1 year of adjuvant treatment with dabrafenib and trametinib here for follow-up and surveillance  There are few previous notes that state he received encorafenib and binimetinib, but I misspoke have confirmed that he had received a full year of dabrafenib and trametinib  He is doing well and feels good  No issues or complaints at this time  No side effects from the medications  No chest pain, shortness of breath, rash, itching, diarrhea, nausea, vomiting  I reviewed his most recent echo which demonstrates stable EF and no concerns for any dysfunction or heart failure  Following with PCP and maintaining control of his blood pressure as well  REVIEW OF SYSTEMS:  Review of Systems   Constitutional: Negative for appetite change, fatigue, fever and unexpected weight change  HENT:   Negative for lump/mass  Eyes: Negative for icterus  Respiratory: Negative for cough, shortness of breath and wheezing  Cardiovascular: Negative for leg swelling  Gastrointestinal: Negative for abdominal pain, constipation, diarrhea, nausea and vomiting  Genitourinary: Negative for difficulty urinating and hematuria      Musculoskeletal: Negative for arthralgias, gait problem and myalgias  Skin: Negative for itching and rash  No new, changing, or concerning lesions  Neurological: Negative for extremity weakness, gait problem, headaches, light-headedness and numbness  Hematological: Negative for adenopathy  MEDICATIONS:    Current Outpatient Medications:   •  amLODIPine (NORVASC) 5 mg tablet, Take 1 tablet (5 mg total) by mouth daily, Disp: 30 tablet, Rfl: 3  •  lisinopril (ZESTRIL) 20 mg tablet, Take 1 tablet (20 mg total) by mouth daily, Disp: 30 tablet, Rfl: 2  •  sertraline (Zoloft) 50 mg tablet, Take 1 tablet (50 mg total) by mouth daily, Disp: 30 tablet, Rfl: 2     ALLERGIES:  No Known Allergies     ACTIVE PROBLEMS:  Patient Active Problem List   Diagnosis   • Malignant melanoma of upper back (HCC)   • 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 (HCC)   • Primary hypertension   • Anxiety   • Mixed hyperlipidemia   • Hypertriglyceridemia   • Encounter for screening colonoscopy          PAST MEDICAL HISTORY:   History reviewed  No pertinent past medical history       PAST SURGICAL HISTORY:  Past Surgical History:   Procedure Laterality Date   • LYMPH NODE BIOPSY N/A 2/2/2021    Procedure: BIOPSY LYMPH NODE LEFT AXILLARY SENTINEL;  Surgeon: Breanne Ricks MD;  Location: [facility];  Service: Surgical Oncology   • NO PAST SURGERIES     • SKIN LESION EXCISION Left 2/2/2021    Procedure: WIDE EXCISION MELANOMA SCAR UPPER BACK, intraoperative lymphatic mapping, sentinel lymph node biopsy; [facility];  Surgeon: Breanne Ricks MD;  Location: [facility];  Service: Surgical Oncology        SOCIAL HISTORY:  Social History     Socioeconomic History   • Marital status: /Civil Union     Spouse name: None   • Number of children: None   • Years of education: None   • Highest education level: None   Occupational History   • None   Tobacco Use   • Smoking status: Never   • Smokeless tobacco: Never   Vaping Use   • Vaping Use: Never used   Substance and Sexual Activity   • Alcohol use: Yes   • Drug use: Never   • Sexual activity: None   Other Topics Concern   • None   Social History Narrative   • None     Social Determinants of Health     Financial Resource Strain: Not on file   Food Insecurity: Not on file   Transportation Needs: Not on file   Physical Activity: Not on file   Stress: Not on file   Social Connections: Not on file   Intimate Partner Violence: Not on file   Housing Stability: Not on file        FAMILY HISTORY:  Family History   Problem Relation Age of Onset   • Basal cell carcinoma Father    • No Known Problems Mother    • Breast cancer Paternal Aunt 43   • Breast cancer Paternal Grandmother [de-identified]           Objective    PHYSICAL EXAMINATION:   Blood pressure 150/90, pulse 94, temperature 98 °F (36 7 °C), temperature source Temporal, resp  rate 14, height 5' 8" (1 727 m), weight 97 1 kg (214 lb), SpO2 98 %  Pain Score: 0-No pain     ECOG Performance Status    Flowsheet Row Most Recent Value   ECOG Performance Status 0 - Fully active, able to carry on all pre-disease performance without restriction             Physical Exam  Constitutional:       General: He is not in acute distress  Appearance: Normal appearance  He is not toxic-appearing  HENT:      Mouth/Throat:      Mouth: Mucous membranes are moist       Pharynx: Oropharynx is clear  Eyes:      General: No scleral icterus  Cardiovascular:      Rate and Rhythm: Normal rate and regular rhythm  Pulses: Normal pulses  Heart sounds: No murmur heard  No friction rub  No gallop  Pulmonary:      Effort: Pulmonary effort is normal  No respiratory distress  Breath sounds: Normal breath sounds  No wheezing or rales  Abdominal:      General: There is no distension  Palpations: There is no mass  Tenderness: There is no abdominal tenderness  There is no rebound  Musculoskeletal:         General: No swelling or tenderness  Right lower leg: No edema  Left lower leg: No edema  Lymphadenopathy:      Head:      Right side of head: No submandibular, preauricular or posterior auricular adenopathy  Left side of head: No submandibular, preauricular or posterior auricular adenopathy  Cervical: No cervical adenopathy  Right cervical: No superficial or posterior cervical adenopathy  Left cervical: No superficial or posterior cervical adenopathy  Upper Body:      Right upper body: No supraclavicular or axillary adenopathy  Left upper body: No supraclavicular or axillary adenopathy  Lower Body: No right inguinal adenopathy  No left inguinal adenopathy  Skin:     Findings: No rash  Comments: Well healed surgical scar  No evidence of recurrence at primary site  Neurological:      General: No focal deficit present  Mental Status: He is alert and oriented to person, place, and time  Psychiatric:         Mood and Affect: Mood normal          Behavior: Behavior normal          Thought Content: Thought content normal          Judgment: Judgment normal          I reviewed lab data in the chart      WBC   Date Value Ref Range Status   01/04/2023 5 30 4 31 - 10 16 Thousand/uL Final   09/14/2022 4 55 4 31 - 10 16 Thousand/uL Final   05/16/2022 4 67 4 31 - 10 16 Thousand/uL Final     Hemoglobin   Date Value Ref Range Status   01/04/2023 14 6 12 0 - 17 0 g/dL Final   09/14/2022 14 6 12 0 - 17 0 g/dL Final   05/16/2022 13 4 12 0 - 17 0 g/dL Final     Platelets   Date Value Ref Range Status   01/04/2023 161 149 - 390 Thousands/uL Final   09/14/2022 175 149 - 390 Thousands/uL Final   05/16/2022 178 149 - 390 Thousands/uL Final     MCV   Date Value Ref Range Status   01/04/2023 97 82 - 98 fL Final   09/14/2022 96 82 - 98 fL Final   05/16/2022 97 82 - 98 fL Final      Potassium   Date Value Ref Range Status   01/04/2023 3 9 3 5 - 5 3 mmol/L Final   09/14/2022 4 0 3 5 - 5 3 mmol/L Final   05/16/2022 3 7 3 5 - 5 3 mmol/L Final     Chloride   Date Value Ref Range Status   01/04/2023 100 96 - 108 mmol/L Final   09/14/2022 100 96 - 108 mmol/L Final   05/16/2022 100 96 - 108 mmol/L Final     CO2   Date Value Ref Range Status   01/04/2023 26 21 - 32 mmol/L Final   09/14/2022 24 21 - 32 mmol/L Final   05/16/2022 23 21 - 32 mmol/L Final     BUN   Date Value Ref Range Status   01/04/2023 20 5 - 25 mg/dL Final   09/14/2022 19 5 - 25 mg/dL Final   05/16/2022 16 5 - 25 mg/dL Final     Creatinine   Date Value Ref Range Status   01/04/2023 1 29 0 60 - 1 30 mg/dL Final     Comment:     Standardized to IDMS reference method   09/14/2022 1 28 0 60 - 1 30 mg/dL Final     Comment:     Standardized to IDMS reference method   05/16/2022 1 19 0 60 - 1 30 mg/dL Final     Comment:     Standardized to IDMS reference method     Calcium   Date Value Ref Range Status   01/04/2023 9 2 8 4 - 10 2 mg/dL Final   09/14/2022 9 6 8 4 - 10 2 mg/dL Final   05/16/2022 9 2 8 4 - 10 2 mg/dL Final     Albumin   Date Value Ref Range Status   01/04/2023 4 3 3 5 - 5 0 g/dL Final   09/14/2022 4 6 3 5 - 5 0 g/dL Final   05/16/2022 4 4 3 5 - 5 0 g/dL Final     Total Bilirubin   Date Value Ref Range Status   01/04/2023 0 64 0 20 - 1 00 mg/dL Final   09/14/2022 0 71 0 20 - 1 00 mg/dL Final   05/16/2022 0 63 0 20 - 1 00 mg/dL Final     Alkaline Phosphatase   Date Value Ref Range Status   01/04/2023 52 34 - 104 U/L Final   09/14/2022 45 34 - 104 U/L Final   05/16/2022 55 34 - 104 U/L Final     AST   Date Value Ref Range Status   01/04/2023 30 13 - 39 U/L Final     Comment:     Specimen collection should occur prior to Sulfasalazine administration due to the potential for falsely depressed results  09/14/2022 24 13 - 39 U/L Final     Comment:     Specimen collection should occur prior to Sulfasalazine administration due to the potential for falsely depressed results      05/16/2022 21 13 - 39 U/L Final     Comment:     Specimen collection should occur prior to Sulfasalazine administration due to the potential for falsely depressed results  ALT   Date Value Ref Range Status   01/04/2023 39 7 - 52 U/L Final     Comment:     Specimen collection should occur prior to Sulfasalazine administration due to the potential for falsely depressed results  09/14/2022 29 7 - 52 U/L Final     Comment:     Specimen collection should occur prior to Sulfasalazine administration due to the potential for falsely depressed results  05/16/2022 26 7 - 52 U/L Final     Comment:     Specimen collection should occur prior to Sulfasalazine administration due to the potential for falsely depressed results  LD   Date Value Ref Range Status   01/04/2023 180 140 - 271 U/L Final   09/14/2022 165 140 - 271 U/L Final   05/16/2022 183 140 - 271 U/L Final     LDH   Date Value Ref Range Status   09/14/2022 162 121 - 224 IU/L Final     No results found for: TSH  No results found for: [id number]   No results found for: FREET4      RECENT IMAGING:  No results found  Assessment/Plan  Mr King Bashir is a 50 yr male with stage IIIb melanoma status post 1 year of adjuvant dabrafenib and trametinib here for follow-up and surveillance  1  Malignant melanoma of upper back ([facility] [address] )  2  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 [facility])  He is doing well and feels good  Denies any new, changing, or concerning skin lesions at this time  Denies any lymphadenopathy  He also recently underwent ultrasound of the lymph node basin which was negative for any abnormal looking lymph nodes  No clinical evidence of melanoma recurrence  Labs reviewed and okay  Echo performed and was reviewed  EF stable  We can stop monitoring cardiac toxicity since he is off treatment and has not had any issues with cardiac dysfunction  He knows to let us know if he has any symptoms  He will return for follow-up in 3 months  At that time he would be due for full body imaging as well as blood work  All orders placed and prescription provided      He knows to call with any issues or concerns prior to his next visit  - CT chest abdomen pelvis w contrast; Future  - CBC and differential; Future  - Comprehensive metabolic panel; Future  - LD,Blood; Future        3  High risk medication use  He was on treatment with immunotherapy and we will continue to monitor for side effects and lab abnormalities  We will monitor labs with each visit to ensure no developing issues s/p treatment  Return in about 3 months (around 4/4/2023) for Office Visit, labs, Imaging - See orders       Gregor Restrepo MD, PhD

## 2023-01-06 NOTE — PROGRESS NOTES
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 ([address] )  -     CT chest abdomen pelvis w contrast; Future  -     CBC and differential; Future  -     Comprehensive metabolic panel; Future  -     LD,Blood; Future    High risk medication use        Oncology History   Malignant melanoma of upper back ([facility] [address] )   12/2020 Biopsy    Left upper back shave biopsy:  - Malignant melanoma 2 3mm    Mitosis: 6  Ulceration: not identified     2/2/2021 Surgery    Wide excision with sentinel lymph node biopsy  Lymph Node, [place], number 1, biopsy:  - Metastatic melanoma in one lymph node (1/1, larger focus approximately 1 3 mm; subcapsular and intraparenchyma), no extranodal extension      Skin, left back, excision:  - Residual melanoma, 1 3 mm; prior biopsy site reaction   - Inked margins with no tumor seen  Lymph Node, [place], sentinel node #2, biopsy:  - Metastatic melanoma in one of ten lymph nodes (1/10, single and small nests of cells, subcapsular, larger focus 0 11 mm); no extranodal extension  2/2/2021 -  Cancer Staged    Staging form: Melanoma of the Skin, AJCC 8th Edition  - Pathologic stage from 2/2/2021: Stage IIIB (pT3a, pN2a, cM0) - Signed by Juliane Julio MD on 2/4/2022  Stage prefix: Initial diagnosis       4/8/2021 Genomic Testing    Decision DX: Class 1B     4/2021 - 4/2022 Chemotherapy     Tafinalar 150 mg b i d  Mekinist 2 mg p o   Daily        Cancer Staging   Malignant melanoma of upper back [facility])  Staging form: Melanoma of the Skin, AJCC 8th Edition  - Pathologic stage from 2/2/2021: Stage IIIB (pT3a, pN2a, cM0) - Signed by Flor Mattson MD on 2/4/2022          HISTORY OF PRESENT ILLNESS: Lewis Jacobs is a 50 y o  male  who has stage IIIb melanoma who received 1 year of adjuvant treatment with dabrafenib and trametinib here for follow-up and surveillance  There are few previous notes that state he received encorafenib and binimetinib, but I misspoke have confirmed that he had received a full year of dabrafenib and trametinib  He is doing well and feels good  No issues or complaints at this time  No side effects from the medications  No chest pain, shortness of breath, rash, itching, diarrhea, nausea, vomiting  I reviewed his most recent echo which demonstrates stable EF and no concerns for any dysfunction or heart failure  Following with PCP and maintaining control of his blood pressure as well  REVIEW OF SYSTEMS:  Review of Systems   Constitutional: Negative for appetite change, fatigue, fever and unexpected weight change  HENT:   Negative for lump/mass  Eyes: Negative for icterus  Respiratory: Negative for cough, shortness of breath and wheezing  Cardiovascular: Negative for leg swelling  Gastrointestinal: Negative for abdominal pain, constipation, diarrhea, nausea and vomiting  Genitourinary: Negative for difficulty urinating and hematuria  Musculoskeletal: Negative for arthralgias, gait problem and myalgias  Skin: Negative for itching and rash  No new, changing, or concerning lesions  Neurological: Negative for extremity weakness, gait problem, headaches, light-headedness and numbness  Hematological: Negative for adenopathy          MEDICATIONS:    Current Outpatient Medications:   •  amLODIPine (NORVASC) 5 mg tablet, Take 1 tablet (5 mg total) by mouth daily, Disp: 30 tablet, Rfl: 3  •  lisinopril (ZESTRIL) 20 mg tablet, Take 1 tablet (20 mg total) by mouth daily, Disp: 30 tablet, Rfl: 2  • sertraline (Zoloft) 50 mg tablet, Take 1 tablet (50 mg total) by mouth daily, Disp: 30 tablet, Rfl: 2     ALLERGIES:  No Known Allergies     ACTIVE PROBLEMS:  Patient Active Problem List   Diagnosis   • Malignant melanoma of upper back ([facility] [address] )   • 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 (HCC)   • Primary hypertension   • Anxiety   • Mixed hyperlipidemia   • Hypertriglyceridemia   • Encounter for screening colonoscopy          PAST MEDICAL HISTORY:   History reviewed  No pertinent past medical history  PAST SURGICAL HISTORY:  Past Surgical History:   Procedure Laterality Date   • LYMPH NODE BIOPSY N/A 2/2/2021    Procedure: BIOPSY LYMPH NODE LEFT AXILLARY SENTINEL;  Surgeon: Wei Mcallister MD;  Location: [facility];  Service: Surgical Oncology   • NO PAST SURGERIES     • SKIN LESION EXCISION Left 2/2/2021    Procedure: WIDE EXCISION MELANOMA SCAR UPPER BACK, intraoperative lymphatic mapping, sentinel lymph node biopsy; [address] [place] [address] MED;  Surgeon: Wei Mcallister MD;  Location: [facility];  Service: Surgical Oncology        SOCIAL HISTORY:  Social History     Socioeconomic History   • Marital status: /Civil Union     Spouse name: None   • Number of children: None   • Years of education: None   • Highest education level: None   Occupational History   • None   Tobacco Use   • Smoking status: Never   • Smokeless tobacco: Never   Vaping Use   • Vaping Use: Never used   Substance and Sexual Activity   • Alcohol use:  Yes   • Drug use: Never   • Sexual activity: None   Other Topics Concern   • None   Social History Narrative   • None     Social Determinants of Health     Financial Resource Strain: Not on file   Food Insecurity: Not on file   Transportation Needs: Not on file   Physical Activity: Not on file   Stress: Not on file   Social Connections: Not on file   Intimate Partner Violence: Not on file   Housing Stability: Not on file        FAMILY HISTORY:  Family History   Problem Relation Age of Onset   • Basal cell carcinoma Father    • No Known Problems Mother    • Breast cancer Paternal Aunt 43   • Breast cancer Paternal Grandmother [de-identified]           Objective    PHYSICAL EXAMINATION:   Blood pressure 150/90, pulse 94, temperature 98 °F (36 7 °C), temperature source Temporal, resp  rate 14, height 5' 8" (1 727 m), weight 97 1 kg (214 lb), SpO2 98 %  Pain Score: 0-No pain     ECOG Performance Status    Flowsheet Row Most Recent Value   ECOG Performance Status 0 - Fully active, able to carry on all pre-disease performance without restriction             Physical Exam  Constitutional:       General: He is not in acute distress  Appearance: Normal appearance  He is not toxic-appearing  HENT:      Mouth/Throat:      Mouth: Mucous membranes are moist       Pharynx: Oropharynx is clear  Eyes:      General: No scleral icterus  Cardiovascular:      Rate and Rhythm: Normal rate and regular rhythm  Pulses: Normal pulses  Heart sounds: No murmur heard  No friction rub  No gallop  Pulmonary:      Effort: Pulmonary effort is normal  No respiratory distress  Breath sounds: Normal breath sounds  No wheezing or rales  Abdominal:      General: There is no distension  Palpations: There is no mass  Tenderness: There is no abdominal tenderness  There is no rebound  Musculoskeletal:         General: No swelling or tenderness  Right lower leg: No edema  Left lower leg: No edema  Lymphadenopathy:      Head:      Right side of head: No submandibular, preauricular or posterior auricular adenopathy  Left side of head: No submandibular, preauricular or posterior auricular adenopathy  Cervical: No cervical adenopathy  Right cervical: No superficial or posterior cervical adenopathy  Left cervical: No superficial or posterior cervical adenopathy  Upper Body:      Right upper body: No supraclavicular or axillary adenopathy        Left upper body: No supraclavicular or axillary adenopathy  Lower Body: No right inguinal adenopathy  No left inguinal adenopathy  Skin:     Findings: No rash  Comments: Well healed surgical scar  No evidence of recurrence at primary site  Neurological:      General: No focal deficit present  Mental Status: He is alert and oriented to person, place, and time  Psychiatric:         Mood and Affect: Mood normal          Behavior: Behavior normal          Thought Content: Thought content normal          Judgment: Judgment normal          I reviewed lab data in the chart      WBC   Date Value Ref Range Status   01/04/2023 5 30 4 31 - 10 16 Thousand/uL Final   09/14/2022 4 55 4 31 - 10 16 Thousand/uL Final   05/16/2022 4 67 4 31 - 10 16 Thousand/uL Final     Hemoglobin   Date Value Ref Range Status   01/04/2023 14 6 12 0 - 17 0 g/dL Final   09/14/2022 14 6 12 0 - 17 0 g/dL Final   05/16/2022 13 4 12 0 - 17 0 g/dL Final     Platelets   Date Value Ref Range Status   01/04/2023 161 149 - 390 Thousands/uL Final   09/14/2022 175 149 - 390 Thousands/uL Final   05/16/2022 178 149 - 390 Thousands/uL Final     MCV   Date Value Ref Range Status   01/04/2023 97 82 - 98 fL Final   09/14/2022 96 82 - 98 fL Final   05/16/2022 97 82 - 98 fL Final      Potassium   Date Value Ref Range Status   01/04/2023 3 9 3 5 - 5 3 mmol/L Final   09/14/2022 4 0 3 5 - 5 3 mmol/L Final   05/16/2022 3 7 3 5 - 5 3 mmol/L Final     Chloride   Date Value Ref Range Status   01/04/2023 100 96 - 108 mmol/L Final   09/14/2022 100 96 - 108 mmol/L Final   05/16/2022 100 96 - 108 mmol/L Final     CO2   Date Value Ref Range Status   01/04/2023 26 21 - 32 mmol/L Final   09/14/2022 24 21 - 32 mmol/L Final   05/16/2022 23 21 - 32 mmol/L Final     BUN   Date Value Ref Range Status   01/04/2023 20 5 - 25 mg/dL Final   09/14/2022 19 5 - 25 mg/dL Final   05/16/2022 16 5 - 25 mg/dL Final     Creatinine   Date Value Ref Range Status   01/04/2023 1 29 0 60 - 1 30 mg/dL Final     Comment: Standardized to IDMS reference method   09/14/2022 1 28 0 60 - 1 30 mg/dL Final     Comment:     Standardized to IDMS reference method   05/16/2022 1 19 0 60 - 1 30 mg/dL Final     Comment:     Standardized to IDMS reference method     Calcium   Date Value Ref Range Status   01/04/2023 9 2 8 4 - 10 2 mg/dL Final   09/14/2022 9 6 8 4 - 10 2 mg/dL Final   05/16/2022 9 2 8 4 - 10 2 mg/dL Final     Albumin   Date Value Ref Range Status   01/04/2023 4 3 3 5 - 5 0 g/dL Final   09/14/2022 4 6 3 5 - 5 0 g/dL Final   05/16/2022 4 4 3 5 - 5 0 g/dL Final     Total Bilirubin   Date Value Ref Range Status   01/04/2023 0 64 0 20 - 1 00 mg/dL Final   09/14/2022 0 71 0 20 - 1 00 mg/dL Final   05/16/2022 0 63 0 20 - 1 00 mg/dL Final     Alkaline Phosphatase   Date Value Ref Range Status   01/04/2023 52 34 - 104 U/L Final   09/14/2022 45 34 - 104 U/L Final   05/16/2022 55 34 - 104 U/L Final     AST   Date Value Ref Range Status   01/04/2023 30 13 - 39 U/L Final     Comment:     Specimen collection should occur prior to Sulfasalazine administration due to the potential for falsely depressed results  09/14/2022 24 13 - 39 U/L Final     Comment:     Specimen collection should occur prior to Sulfasalazine administration due to the potential for falsely depressed results  05/16/2022 21 13 - 39 U/L Final     Comment:     Specimen collection should occur prior to Sulfasalazine administration due to the potential for falsely depressed results  ALT   Date Value Ref Range Status   01/04/2023 39 7 - 52 U/L Final     Comment:     Specimen collection should occur prior to Sulfasalazine administration due to the potential for falsely depressed results  09/14/2022 29 7 - 52 U/L Final     Comment:     Specimen collection should occur prior to Sulfasalazine administration due to the potential for falsely depressed results      05/16/2022 26 7 - 52 U/L Final     Comment:     Specimen collection should occur prior to Sulfasalazine administration due to the potential for falsely depressed results  LD   Date Value Ref Range Status   01/04/2023 180 140 - 271 U/L Final   09/14/2022 165 140 - 271 U/L Final   05/16/2022 183 140 - 271 U/L Final     LDH   Date Value Ref Range Status   09/14/2022 162 121 - 224 IU/L Final     No results found for: TSH  No results found for: [id number]   No results found for: FREET4      RECENT IMAGING:  No results found  Assessment/Plan  Mr Maycol Crane is a 50 yr male with stage IIIb melanoma status post 1 year of adjuvant dabrafenib and trametinib here for follow-up and surveillance  1  Malignant melanoma of upper back ([facility] [address] )  2  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 [facility])  He is doing well and feels good  Denies any new, changing, or concerning skin lesions at this time  Denies any lymphadenopathy  He also recently underwent ultrasound of the lymph node basin which was negative for any abnormal looking lymph nodes  No clinical evidence of melanoma recurrence  Labs reviewed and okay  Echo performed and was reviewed  EF stable  We can stop monitoring cardiac toxicity since he is off treatment and has not had any issues with cardiac dysfunction  He knows to let us know if he has any symptoms  He will return for follow-up in 3 months  At that time he would be due for full body imaging as well as blood work  All orders placed and prescription provided  He knows to call with any issues or concerns prior to his next visit  - CT chest abdomen pelvis w contrast; Future  - CBC and differential; Future  - Comprehensive metabolic panel; Future  - LD,Blood; Future        3  High risk medication use  He was on treatment with immunotherapy and we will continue to monitor for side effects and lab abnormalities  We will monitor labs with each visit to ensure no developing issues s/p treatment  Return in about 3 months (around 4/4/2023) for Office Visit, labs, Imaging - See orders       Gwen Zhu Marcelina Mccracken MD, PhD

## 2023-01-13 ENCOUNTER — TELEPHONE (OUTPATIENT)
Dept: GASTROENTEROLOGY | Facility: CLINIC | Age: 48
End: 2023-01-13

## 2023-01-13 NOTE — TELEPHONE ENCOUNTER
Called patient regarding referral placed for GI consult  Patient declined to schedule appointment at this time  He will call back when he would like to schedule

## 2023-03-15 DIAGNOSIS — F41.9 ANXIETY: ICD-10-CM

## 2023-03-15 DIAGNOSIS — I10 PRIMARY HYPERTENSION: ICD-10-CM

## 2023-03-15 RX ORDER — LISINOPRIL 20 MG/1
20 TABLET ORAL DAILY
Qty: 30 TABLET | Refills: 2 | Status: CANCELLED | OUTPATIENT
Start: 2023-03-15

## 2023-03-16 DIAGNOSIS — I10 PRIMARY HYPERTENSION: ICD-10-CM

## 2023-03-16 DIAGNOSIS — I10 HYPERTENSION, UNSPECIFIED TYPE: ICD-10-CM

## 2023-03-16 DIAGNOSIS — F41.9 ANXIETY: ICD-10-CM

## 2023-03-16 RX ORDER — LISINOPRIL 20 MG/1
20 TABLET ORAL DAILY
Qty: 30 TABLET | Refills: 0 | Status: SHIPPED | OUTPATIENT
Start: 2023-03-16

## 2023-04-07 ENCOUNTER — APPOINTMENT (OUTPATIENT)
Dept: LAB | Facility: HOSPITAL | Age: 48
End: 2023-04-07

## 2023-04-07 DIAGNOSIS — C43.9 MELANOMA METASTATIC TO LYMPH NODE (HCC): ICD-10-CM

## 2023-04-07 DIAGNOSIS — E78.1 HYPERTRIGLYCERIDEMIA: ICD-10-CM

## 2023-04-07 DIAGNOSIS — Z12.5 SCREENING FOR PROSTATE CANCER: ICD-10-CM

## 2023-04-07 DIAGNOSIS — C77.9 MELANOMA METASTATIC TO LYMPH NODE (HCC): ICD-10-CM

## 2023-04-07 DIAGNOSIS — E78.2 MIXED HYPERLIPIDEMIA: ICD-10-CM

## 2023-04-07 DIAGNOSIS — F41.9 ANXIETY: ICD-10-CM

## 2023-04-07 DIAGNOSIS — C43.59 MALIGNANT MELANOMA OF UPPER BACK (HCC): ICD-10-CM

## 2023-04-07 DIAGNOSIS — R73.01 IFG (IMPAIRED FASTING GLUCOSE): ICD-10-CM

## 2023-04-07 DIAGNOSIS — I10 PRIMARY HYPERTENSION: ICD-10-CM

## 2023-04-07 LAB
ALBUMIN SERPL BCP-MCNC: 4.6 G/DL (ref 3.5–5)
ALP SERPL-CCNC: 38 U/L (ref 34–104)
ALT SERPL W P-5'-P-CCNC: 49 U/L (ref 7–52)
ANION GAP SERPL CALCULATED.3IONS-SCNC: 11 MMOL/L (ref 4–13)
AST SERPL W P-5'-P-CCNC: 32 U/L (ref 13–39)
BASOPHILS # BLD AUTO: 0.02 THOUSANDS/ÂΜL (ref 0–0.1)
BASOPHILS NFR BLD AUTO: 1 % (ref 0–1)
BILIRUB SERPL-MCNC: 0.77 MG/DL (ref 0.2–1)
BUN SERPL-MCNC: 20 MG/DL (ref 5–25)
CALCIUM SERPL-MCNC: 9.7 MG/DL (ref 8.4–10.2)
CHLORIDE SERPL-SCNC: 101 MMOL/L (ref 96–108)
CHOLEST SERPL-MCNC: 211 MG/DL
CO2 SERPL-SCNC: 25 MMOL/L (ref 21–32)
CREAT SERPL-MCNC: 1.3 MG/DL (ref 0.6–1.3)
EOSINOPHIL # BLD AUTO: 0.04 THOUSAND/ÂΜL (ref 0–0.61)
EOSINOPHIL NFR BLD AUTO: 1 % (ref 0–6)
ERYTHROCYTE [DISTWIDTH] IN BLOOD BY AUTOMATED COUNT: 13.5 % (ref 11.6–15.1)
EST. AVERAGE GLUCOSE BLD GHB EST-MCNC: 114 MG/DL
GFR SERPL CREATININE-BSD FRML MDRD: 64 ML/MIN/1.73SQ M
GLUCOSE P FAST SERPL-MCNC: 107 MG/DL (ref 65–99)
HBA1C MFR BLD: 5.6 %
HCT VFR BLD AUTO: 46.3 % (ref 36.5–49.3)
HDLC SERPL-MCNC: 34 MG/DL
HGB BLD-MCNC: 15 G/DL (ref 12–17)
IMM GRANULOCYTES # BLD AUTO: 0 THOUSAND/UL (ref 0–0.2)
IMM GRANULOCYTES NFR BLD AUTO: 0 % (ref 0–2)
LDH SERPL-CCNC: 162 U/L (ref 140–271)
LDLC SERPL CALC-MCNC: 148 MG/DL (ref 0–100)
LYMPHOCYTES # BLD AUTO: 1.37 THOUSANDS/ÂΜL (ref 0.6–4.47)
LYMPHOCYTES NFR BLD AUTO: 34 % (ref 14–44)
MCH RBC QN AUTO: 31.3 PG (ref 26.8–34.3)
MCHC RBC AUTO-ENTMCNC: 32.4 G/DL (ref 31.4–37.4)
MCV RBC AUTO: 97 FL (ref 82–98)
MONOCYTES # BLD AUTO: 0.32 THOUSAND/ÂΜL (ref 0.17–1.22)
MONOCYTES NFR BLD AUTO: 8 % (ref 4–12)
NEUTROPHILS # BLD AUTO: 2.29 THOUSANDS/ÂΜL (ref 1.85–7.62)
NEUTS SEG NFR BLD AUTO: 56 % (ref 43–75)
NONHDLC SERPL-MCNC: 177 MG/DL
NRBC BLD AUTO-RTO: 0 /100 WBCS
PLATELET # BLD AUTO: 164 THOUSANDS/UL (ref 149–390)
PMV BLD AUTO: 10.6 FL (ref 8.9–12.7)
POTASSIUM SERPL-SCNC: 4.1 MMOL/L (ref 3.5–5.3)
PROT SERPL-MCNC: 7.5 G/DL (ref 6.4–8.4)
PSA SERPL-MCNC: 0.7 NG/ML (ref 0–4)
RBC # BLD AUTO: 4.79 MILLION/UL (ref 3.88–5.62)
SODIUM SERPL-SCNC: 137 MMOL/L (ref 135–147)
TRIGL SERPL-MCNC: 145 MG/DL
TSH SERPL DL<=0.05 MIU/L-ACNC: 2.46 UIU/ML (ref 0.45–4.5)
WBC # BLD AUTO: 4.04 THOUSAND/UL (ref 4.31–10.16)

## 2023-04-22 ENCOUNTER — HOSPITAL ENCOUNTER (OUTPATIENT)
Dept: ULTRASOUND IMAGING | Facility: HOSPITAL | Age: 48
Discharge: HOME | End: 2023-04-22

## 2023-04-22 DIAGNOSIS — C43.59 MALIGNANT MELANOMA OF UPPER BACK (HCC): ICD-10-CM

## 2023-04-22 DIAGNOSIS — C43.9 MELANOMA METASTATIC TO LYMPH NODE (HCC): ICD-10-CM

## 2023-04-22 DIAGNOSIS — C77.9 MELANOMA METASTATIC TO LYMPH NODE (HCC): ICD-10-CM

## 2023-04-24 ENCOUNTER — TELEPHONE (OUTPATIENT)
Dept: SURGICAL ONCOLOGY | Facility: CLINIC | Age: 48
End: 2023-04-24

## 2023-04-24 NOTE — TELEPHONE ENCOUNTER
LM for patient per Gabby Gan that repeat US was ok, the lymph node is enlarged, but has no concerning features  Teams number given

## 2023-05-19 DIAGNOSIS — I10 PRIMARY HYPERTENSION: ICD-10-CM

## 2023-05-19 DIAGNOSIS — I10 HYPERTENSION, UNSPECIFIED TYPE: ICD-10-CM

## 2023-05-19 RX ORDER — AMLODIPINE BESYLATE 5 MG/1
5 TABLET ORAL DAILY
Qty: 90 TABLET | Refills: 0 | Status: SHIPPED | OUTPATIENT
Start: 2023-05-19 | End: 2023-08-17

## 2023-05-19 RX ORDER — LISINOPRIL 20 MG/1
20 TABLET ORAL DAILY
Qty: 30 TABLET | Refills: 0 | Status: SHIPPED | OUTPATIENT
Start: 2023-05-19

## 2023-05-30 ENCOUNTER — TELEPHONE (OUTPATIENT)
Dept: HEMATOLOGY ONCOLOGY | Facility: CLINIC | Age: 48
End: 2023-05-30

## 2023-05-30 NOTE — TELEPHONE ENCOUNTER
Appointment Change  Cancel, Reschedule, Change to Virtual      Who are you speaking with? Patient   If it is not the patient, are they listed on an active communication consent form? N/A   Which provider is the appointment scheduled with? Dr Tracy Adhikari   When is the appointment scheduled? Please list date and time  6/2/23 @ 10:30am   At which location is the appointment scheduled to take place? [facility]   Was the appointment rescheduled or changed from an in person visit to a virtual visit? If so, please list the details of the change  Yes 6/9/23 @ 3:45pm   What is the reason for the appointment change? Needed to reschedule due to work   Was [facility] transport scheduled for this visit? no   Does [facility] need to be scheduled for the new visit (if applicable) no   Does the patient need an infusion appointment rescheduled? no   Does the patient have an infusion appointment scheduled? If so, when? na   Is the patient undergoing chemotherapy? no   Was the no-show policy reviewed for appointments being changed with less then 24 hours of notice?  yes

## 2023-06-06 PROBLEM — Z85.820 ENCOUNTER FOR FOLLOW-UP SURVEILLANCE OF MELANOMA: Status: ACTIVE | Noted: 2023-06-06

## 2023-06-06 PROBLEM — Z08 ENCOUNTER FOR FOLLOW-UP SURVEILLANCE OF MELANOMA: Status: ACTIVE | Noted: 2023-06-06

## 2023-06-06 PROBLEM — Z85.820 HISTORY OF MELANOMA: Status: ACTIVE | Noted: 2021-01-14

## 2023-06-08 DIAGNOSIS — I10 PRIMARY HYPERTENSION: ICD-10-CM

## 2023-06-08 RX ORDER — LISINOPRIL 20 MG/1
TABLET ORAL
Qty: 30 TABLET | Refills: 0 | Status: SHIPPED | OUTPATIENT
Start: 2023-06-08

## 2023-06-09 ENCOUNTER — OFFICE VISIT (OUTPATIENT)
Dept: SURGICAL ONCOLOGY | Facility: CLINIC | Age: 48
End: 2023-06-09
Payer: COMMERCIAL

## 2023-06-09 VITALS
BODY MASS INDEX: 33.34 KG/M2 | DIASTOLIC BLOOD PRESSURE: 80 MMHG | HEART RATE: 73 BPM | RESPIRATION RATE: 16 BRPM | HEIGHT: 68 IN | WEIGHT: 220 LBS | SYSTOLIC BLOOD PRESSURE: 136 MMHG | OXYGEN SATURATION: 97 % | TEMPERATURE: 98.2 F

## 2023-06-09 DIAGNOSIS — Z85.820 ENCOUNTER FOR FOLLOW-UP SURVEILLANCE OF MELANOMA: Primary | ICD-10-CM

## 2023-06-09 DIAGNOSIS — Z08 ENCOUNTER FOR FOLLOW-UP SURVEILLANCE OF MELANOMA: Primary | ICD-10-CM

## 2023-06-09 DIAGNOSIS — Z85.820 HISTORY OF MELANOMA: ICD-10-CM

## 2023-06-09 PROCEDURE — 99214 OFFICE O/P EST MOD 30 MIN: CPT | Performed by: SURGERY

## 2023-06-09 NOTE — PROGRESS NOTES
Surgical Oncology Follow Up       [facility] SURGICAL ONCOLOGY RONNIISA  [facility] [address]    [facility]  1975  [id number]  [facility] SURGICAL ONCOLOGY [facility] [id number]    Chief Complaint   Patient presents with   • Follow-up       Assessment/Plan:    No problem-specific Assessment & Plan notes found for this encounter  Diagnoses and all orders for this visit:    Encounter for follow-up surveillance of melanoma    History of melanoma        Advance Care Planning/Advance Directives:  Discussed disease status, cancer treatment plans and/or cancer treatment goals with the patient  Oncology History   History of melanoma   12/2020 Biopsy    Left upper back shave biopsy:  - Malignant melanoma 2 3mm    Mitosis: 6  Ulceration: not identified     2/2/2021 Surgery    Wide excision with sentinel lymph node biopsy  Lymph Node, [place], number 1, biopsy:  - Metastatic melanoma in one lymph node (1/1, larger focus approximately 1 3 mm; subcapsular and intraparenchyma), no extranodal extension      Skin, left back, excision:  - Residual melanoma, 1 3 mm; prior biopsy site reaction   - Inked margins with no tumor seen  Lymph Node, [place], sentinel node #2, biopsy:  - Metastatic melanoma in one of ten lymph nodes (1/10, single and small nests of cells, subcapsular, larger focus 0 11 mm); no extranodal extension  2/2/2021 -  Cancer Staged    Staging form: Melanoma of the Skin, AJCC 8th Edition  - Pathologic stage from 2/2/2021: Stage IIIB (pT3a, pN2a, cM0) - Signed by Rai Powers MD on 2/4/2022  Stage prefix: Initial diagnosis       4/8/2021 Genomic Testing    Decision DX: Class 1B     4/2021 - 4/2022 Chemotherapy     Tafinalar 150 mg b i d  Mekinist 2 mg p o  Daily         History of Present Illness: 69-year-old man with history of stage III melanoma here for surveillance    He has no new skin complaints to report  -Interval History: He had blood work and scan done recently anticipation of today's visit  Review of Systems:  Review of Systems   Constitutional: Negative  HENT: Negative  Eyes: Negative  Respiratory: Negative  Cardiovascular: Negative  Gastrointestinal: Negative  Endocrine: Negative  Genitourinary: Negative  Musculoskeletal: Negative  Skin: Negative  Allergic/Immunologic: Negative  Neurological: Negative  Hematological: Negative  Psychiatric/Behavioral: Negative  All other systems reviewed and are negative  Patient Active Problem List   Diagnosis   • History of melanoma   • 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 [facility])   • Primary hypertension   • Anxiety   • Mixed hyperlipidemia   • Hypertriglyceridemia   • Encounter for screening colonoscopy   • Encounter for follow-up surveillance of melanoma     No past medical history on file    Past Surgical History:   Procedure Laterality Date   • LYMPH NODE BIOPSY N/A 2/2/2021    Procedure: BIOPSY LYMPH NODE LEFT AXILLARY SENTINEL;  Surgeon: Deshawn Kenney MD;  Location: [facility];  Service: Surgical Oncology   • NO PAST SURGERIES     • SKIN LESION EXCISION Left 2/2/2021    Procedure: WIDE EXCISION MELANOMA SCAR UPPER BACK, intraoperative lymphatic mapping, sentinel lymph node biopsy; [address];  Surgeon: Deshawn Kenney MD;  Location: [facility];  Service: Surgical Oncology     Family History   Problem Relation Age of Onset   • Basal cell carcinoma Father    • No Known Problems Mother    • Breast cancer Paternal Aunt 37   • Breast cancer Paternal Grandmother [de-identified]     Social History     Socioeconomic History   • Marital status: /Civil Union     Spouse name: Not on file   • Number of children: Not on file   • Years of education: Not on file   • Highest education level: Not on file   Occupational History   • Not on file   Tobacco Use   • Smoking status: Never   • Smokeless tobacco: Never   Vaping Use   • Vaping Use: Never used   Substance and Sexual Activity   • Alcohol use: Yes   • Drug use: Never   • Sexual activity: Not on file   Other Topics Concern   • Not on file   Social History Narrative   • Not on file     Social Determinants of Health     Financial Resource Strain: Not on file   Food Insecurity: Not on file   Transportation Needs: Not on file   Physical Activity: Not on file   Stress: Not on file   Social Connections: Not on file   Intimate Partner Violence: Not on file   Housing Stability: Not on file       Current Outpatient Medications:   •  amLODIPine (NORVASC) 5 mg tablet, Take 1 tablet (5 mg total) by mouth daily, Disp: 90 tablet, Rfl: 0  •  lisinopril (ZESTRIL) 20 mg tablet, Take 1 tablet by mouth once daily, Disp: 30 tablet, Rfl: 0  •  sertraline (Zoloft) 50 mg tablet, Take 1 tablet (50 mg total) by mouth daily, Disp: 30 tablet, Rfl: 0  No Known Allergies  Vitals:    06/09/23 1536   BP: 136/80   Pulse: 73   Resp: 16   Temp: 98 2 °F (36 8 °C)   SpO2: 97%       Physical Exam  Vitals reviewed  Constitutional:       Appearance: Normal appearance  HENT:      Head: Normocephalic and atraumatic  Right Ear: External ear normal       Left Ear: External ear normal       Mouth/Throat:      Mouth: Mucous membranes are dry  Eyes:      Extraocular Movements: Extraocular movements intact  Pupils: Pupils are equal, round, and reactive to light  Cardiovascular:      Rate and Rhythm: Normal rate and regular rhythm  Pulses: Normal pulses  Heart sounds: Normal heart sounds  Pulmonary:      Breath sounds: Normal breath sounds  Abdominal:      General: Abdomen is flat  Palpations: Abdomen is soft  Musculoskeletal:         General: Normal range of motion  Cervical back: Normal range of motion and neck supple  Skin:     General: Skin is warm and dry  Neurological:      General: No focal deficit present        Mental Status: He is alert and oriented to person, place, and time  Psychiatric:         Mood and Affect: Mood normal          Behavior: Behavior normal          Thought Content: Thought content normal          Judgment: Judgment normal            Results:  Labs:  Component Ref Range & Units 4/7/23  7:25 AM 1/4/23  6:28 AM 9/14/22  7:23 AM 5/16/22  8:16 AM 3/2/22  8:03 AM 2/7/22  7:54 AM 1/20/22  6:20 AM    - 271 U/L 162                Imaging    Please note:     Based on the recommendation in report of most recent prior ultrasound, the patient returned for additional imaging  This demonstrates a 2 1 x 1 2 x 1 4 cm morphologically normal lymph node with a normal fatty hilum medial to the scar and inferior to the   location of the other previously imaged left axillary node  Based on its location inferior and lateral to the node which has been followed on multiple prior ultrasound examinations, this corresponds to lymph node identified on image 41 of series 3 of   the most recent CT (April 12, 2023)  This also corresponds to the abnormality discussed on the cine sweep of most recent ultrasound      The lymph node identified on the most recent ultrasound (April 18, 2023) corresponds to the node identified on image 36 of series 3 of the April 12, 2023 CT  This is a note that has been followed on multiple prior ultrasounds and is present on prior CT   examinations, similar in CT appearance as far back as June 2021      IMPRESSION:  No suspicious left axillary lymph nodes      Workstation performed: [id number]    Left axilla ULTRASOUND     INDICATION:   C43 59:  Malignant melanoma of other part of trunk  C43 9: Malignant melanoma of skin, unspecified  C77 9: Secondary and unspecified malignant neoplasm of lymph node, unspecified      COMPARISON:  April 12, 2023 CT     TECHNIQUE:   Real-time ultrasound of the left axilla was performed with a linear transducer with both volumetric sweeps and still imaging techniques      FINDINGS:  A single lymph node is identified and measured in the left axilla with short axis dimension of 8 mm  Normal morphology and preserved fatty hilum  On review of the recent prior CT and volumetric sweeps, there is a questionable suspicious heterogeneous lymph node seen on the sagittal sweeps, image 76/397  It measures 10 mm and is rounded  Review of the CT suggests this may represent a lymph node   which is more rounded and does not have a preserved fatty hilum and is slightly larger than on a prior CT from September  We will recall this patient and reimaged this area to confirm  IMPRESSION:     Possible suspicious left axillary lymph node as discussed above  Additional repeat imaging to confirm recommended      The study was marked in EPIC for significant notification        CT CHEST, ABDOMEN AND PELVIS WITH IV CONTRAST     INDICATION:   C43 59: Malignant melanoma of other part of trunk  C43 9: Malignant melanoma of skin, unspecified  C77 9: Secondary and unspecified malignant neoplasm of lymph node, unspecified      COMPARISON:  Multiple prior chest/abdomen/pelvis CTs with the most recent being obtained 9/19/2022      TECHNIQUE: CT examination of the chest, abdomen and pelvis was performed  Multiplanar 2D reformatted images were created from the source data      Radiation dose length product (DLP) for this visit:  2294 92 mGy-cm   This examination, like all CT scans performed in the [facility], was performed utilizing techniques to minimize radiation dose exposure, including the use of   iterative reconstruction and automated exposure control      IV Contrast:  100 mL of iohexol (OMNIPAQUE)  Enteric Contrast: Enteric contrast was administered      FINDINGS:     CHEST     LUNGS:  Faint groundglass opacities are seen scattered throughout both lungs, these are not significantly changed from most recent comparison CT    There is no evidence of discrete tracheal or endobronchial lesion      PLEURA: Unremarkable      HEART/GREAT VESSELS: Heart is unremarkable for patient's age  No thoracic aortic aneurysm      MEDIASTINUM AND MAGALYS:  Unremarkable      CHEST WALL AND LOWER NECK:  Postsurgical changes/scarring is seen within the upper back, just to the left of midline  These changes are stable in appearance from prior CT of September 2022  No new appreciable nodularity  Surgical clips are noted   within the left axilla      ABDOMEN     LIVER/BILIARY TREE:  Circumscribed hypodensity within segment 4A measuring 1 2 cm is stable from prior exam (3:107)  This hypodensity is enlarged from more remote comparison study of January 2022 where it measured 7 mm  There are geographic areas of   hypoattenuation within the liver parenchyma which are similar to prior examination, likely representing steatosis      GALLBLADDER:  No calcified gallstones  No pericholecystic inflammatory change      SPLEEN:  Unremarkable      PANCREAS:  Unremarkable      ADRENAL GLANDS:  Unremarkable      KIDNEYS/URETERS:  No hydronephrosis  Left renal cyst is noted      STOMACH AND BOWEL:  No evidence of small bowel obstruction  There is diverticulosis without evidence of focal inflammatory changes      APPENDIX:  No findings to suggest appendicitis      ABDOMINOPELVIC CAVITY:  No ascites  No pneumoperitoneum  No lymphadenopathy      VESSELS:  Unremarkable for patient's age      PELVIS     REPRODUCTIVE ORGANS:  Prostate is unremarkable for patient's age  Trace bilateral hydroceles are noted      URINARY BLADDER:  Bladder wall is mildly thickened in appearance, this is favored to be due to incomplete distention      ABDOMINAL WALL/INGUINAL REGIONS:  Unremarkable      OSSEOUS STRUCTURES:  No acute fracture or destructive osseous lesion  Pars interarticularis defects are noted at the L3 level      IMPRESSION:  1  No CT evidence of adenopathy or other new suspicious findings within the chest, abdomen, and pelvis    2   Subtle scattered groundglass opacities within the bilateral lungs are not significantly changed from most recent comparison CT of September 2022   3   Circumscribed hypodensity measuring 1 2 cm within segment 4 of the liver with central hypoattenuation most compatible with a hepatic cyst   This is stable in size from CT of September 2022 but is noted to be slightly enlarged from more remote   comparison exam of January 2022      Workstation performed: [id number]     Workstation performed: [id number]  I reviewed the above laboratory and imaging data  Discussion/Summary: 49-year-old man with history of stage II melanoma presently JEFF  CAT scan, blood work and ultrasound with MSLT to protocol negative for recurrent disease  Plan on follow-up in 6 months repeat blood work at that time

## 2023-06-09 NOTE — LETTER
June 9, 2023     [place], [address] [address]    Patient: Brandon Rosario   YOB: 1975   Date of Visit: 6/9/2023       Dear Dr Sriram Kelsey: Thank you for referring Molly Weiss to me for evaluation  Below are my notes for this consultation  If you have questions, please do not hesitate to call me  I look forward to following your patient along with you  Sincerely,        Kourtney Velazquez MD        CC: Pritesh Edwards MD Sherol Decent, MD  6/9/2023  4:27 PM  Sign when Signing Visit     Surgical Oncology Follow Up       [facility]  [address] [employer] [id number]    Brandon Rosario  1975  [id number]  [facility] SURGICAL ONCOLOGY [place]  [address] [employer] [id number]    Chief Complaint   Patient presents with   • Follow-up       Assessment/Plan:    No problem-specific Assessment & Plan notes found for this encounter  Diagnoses and all orders for this visit:    Encounter for follow-up surveillance of melanoma    History of melanoma       Advance Care Planning/Advance Directives:  Discussed disease status, cancer treatment plans and/or cancer treatment goals with the patient  Oncology History   History of melanoma   12/2020 Biopsy    Left upper back shave biopsy:  - Malignant melanoma 2 3mm    Mitosis: 6  Ulceration: not identified     2/2/2021 Surgery    Wide excision with sentinel lymph node biopsy  Lymph Node, [place], number 1, biopsy:  - Metastatic melanoma in one lymph node (1/1, larger focus approximately 1 3 mm; subcapsular and intraparenchyma), no extranodal extension      Skin, left back, excision:  - Residual melanoma, 1 3 mm; prior biopsy site reaction   - Inked margins with no tumor seen          Lymph Node, [place], sentinel node #2, biopsy:  - Metastatic melanoma in one of ten lymph nodes (1/10, single and small nests of cells, subcapsular, larger focus 0 11 mm); no extranodal extension  2/2/2021 -  Cancer Staged    Staging form: Melanoma of the Skin, AJCC 8th Edition  - Pathologic stage from 2/2/2021: Stage IIIB (pT3a, pN2a, cM0) - Signed by Nahum Perez MD on 2/4/2022  Stage prefix: Initial diagnosis       4/8/2021 Genomic Testing    Decision DX: Class 1B     4/2021 - 4/2022 Chemotherapy     Tafinalar 150 mg b i d  Mekinist 2 mg p o  Daily         History of Present Illness: 49-year-old man with history of stage III melanoma here for surveillance  He has no new skin complaints to report  -Interval History: He had blood work and scan done recently anticipation of today's visit  Review of Systems:  Review of Systems   Constitutional: Negative  HENT: Negative  Eyes: Negative  Respiratory: Negative  Cardiovascular: Negative  Gastrointestinal: Negative  Endocrine: Negative  Genitourinary: Negative  Musculoskeletal: Negative  Skin: Negative  Allergic/Immunologic: Negative  Neurological: Negative  Hematological: Negative  Psychiatric/Behavioral: Negative  All other systems reviewed and are negative  Patient Active Problem List   Diagnosis   • History of melanoma   • 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 [facility])   • Primary hypertension   • Anxiety   • Mixed hyperlipidemia   • Hypertriglyceridemia   • Encounter for screening colonoscopy   • Encounter for follow-up surveillance of melanoma     No past medical history on file    Past Surgical History:   Procedure Laterality Date   • LYMPH NODE BIOPSY N/A 2/2/2021    Procedure: BIOPSY LYMPH NODE LEFT AXILLARY SENTINEL;  Surgeon: Rachel Hernández MD;  Location: [facility];  Service: Surgical Oncology   • NO PAST SURGERIES     • SKIN LESION EXCISION Left 2/2/2021    Procedure: WIDE EXCISION MELANOMA SCAR UPPER BACK, intraoperative lymphatic mapping, sentinel lymph node biopsy; [address];  Surgeon: Rachel Hernández MD;  Location: [facility];  Service: Surgical Oncology     Family History   Problem Relation Age of Onset   • Basal cell carcinoma Father    • No Known Problems Mother    • Breast cancer Paternal Aunt 43   • Breast cancer Paternal Grandmother [de-identified]     Social History     Socioeconomic History   • Marital status: /Civil Union     Spouse name: Not on file   • Number of children: Not on file   • Years of education: Not on file   • Highest education level: Not on file   Occupational History   • Not on file   Tobacco Use   • Smoking status: Never   • Smokeless tobacco: Never   Vaping Use   • Vaping Use: Never used   Substance and Sexual Activity   • Alcohol use: Yes   • Drug use: Never   • Sexual activity: Not on file   Other Topics Concern   • Not on file   Social History Narrative   • Not on file     Social Determinants of Health     Financial Resource Strain: Not on file   Food Insecurity: Not on file   Transportation Needs: Not on file   Physical Activity: Not on file   Stress: Not on file   Social Connections: Not on file   Intimate Partner Violence: Not on file   Housing Stability: Not on file       Current Outpatient Medications:   •  amLODIPine (NORVASC) 5 mg tablet, Take 1 tablet (5 mg total) by mouth daily, Disp: 90 tablet, Rfl: 0  •  lisinopril (ZESTRIL) 20 mg tablet, Take 1 tablet by mouth once daily, Disp: 30 tablet, Rfl: 0  •  sertraline (Zoloft) 50 mg tablet, Take 1 tablet (50 mg total) by mouth daily, Disp: 30 tablet, Rfl: 0  No Known Allergies  Vitals:    06/09/23 1536   BP: 136/80   Pulse: 73   Resp: 16   Temp: 98 2 °F (36 8 °C)   SpO2: 97%       Physical Exam  Vitals reviewed  Constitutional:       Appearance: Normal appearance  HENT:      Head: Normocephalic and atraumatic  Right Ear: External ear normal       Left Ear: External ear normal       Mouth/Throat:      Mouth: Mucous membranes are dry  Eyes:      Extraocular Movements: Extraocular movements intact        Pupils: Pupils are equal, round, and reactive to light  Cardiovascular:      Rate and Rhythm: Normal rate and regular rhythm  Pulses: Normal pulses  Heart sounds: Normal heart sounds  Pulmonary:      Breath sounds: Normal breath sounds  Abdominal:      General: Abdomen is flat  Palpations: Abdomen is soft  Musculoskeletal:         General: Normal range of motion  Cervical back: Normal range of motion and neck supple  Skin:     General: Skin is warm and dry  Neurological:      General: No focal deficit present  Mental Status: He is alert and oriented to person, place, and time  Psychiatric:         Mood and Affect: Mood normal          Behavior: Behavior normal          Thought Content: Thought content normal          Judgment: Judgment normal            Results:  Labs:  Component Ref Range & Units 4/7/23  7:25 AM 1/4/23  6:28 AM 9/14/22  7:23 AM 5/16/22  8:16 AM 3/2/22  8:03 AM 2/7/22  7:54 AM 1/20/22  6:20 AM    - 271 U/L 162                Imaging    Please note:     Based on the recommendation in report of most recent prior ultrasound, the patient returned for additional imaging  This demonstrates a 2 1 x 1 2 x 1 4 cm morphologically normal lymph node with a normal fatty hilum medial to the scar and inferior to the   location of the other previously imaged left axillary node  Based on its location inferior and lateral to the node which has been followed on multiple prior ultrasound examinations, this corresponds to lymph node identified on image 41 of series 3 of   the most recent CT (April 12, 2023)  This also corresponds to the abnormality discussed on the cine sweep of most recent ultrasound  The lymph node identified on the most recent ultrasound (April 18, 2023) corresponds to the node identified on image 36 of series 3 of the April 12, 2023 CT    This is a note that has been followed on multiple prior ultrasounds and is present on prior CT   examinations, similar in CT appearance as far back as June 2021  IMPRESSION:  No suspicious left axillary lymph nodes  Workstation performed: [id number]    Left axilla ULTRASOUND     INDICATION:   C43 59: Malignant melanoma of other part of trunk  C43 9: Malignant melanoma of skin, unspecified  C77 9: Secondary and unspecified malignant neoplasm of lymph node, unspecified  COMPARISON:  April 12, 2023 CT     TECHNIQUE:   Real-time ultrasound of the left axilla was performed with a linear transducer with both volumetric sweeps and still imaging techniques  FINDINGS:  A single lymph node is identified and measured in the left axilla with short axis dimension of 8 mm  Normal morphology and preserved fatty hilum  On review of the recent prior CT and volumetric sweeps, there is a questionable suspicious heterogeneous lymph node seen on the sagittal sweeps, image 76/397  It measures 10 mm and is rounded  Review of the CT suggests this may represent a lymph node   which is more rounded and does not have a preserved fatty hilum and is slightly larger than on a prior CT from September  We will recall this patient and reimaged this area to confirm  IMPRESSION:     Possible suspicious left axillary lymph node as discussed above  Additional repeat imaging to confirm recommended  The study was marked in EPIC for significant notification  CT CHEST, ABDOMEN AND PELVIS WITH IV CONTRAST     INDICATION:   C43 59: Malignant melanoma of other part of trunk  C43 9: Malignant melanoma of skin, unspecified  C77 9: Secondary and unspecified malignant neoplasm of lymph node, unspecified  COMPARISON:  Multiple prior chest/abdomen/pelvis CTs with the most recent being obtained 9/19/2022  TECHNIQUE: CT examination of the chest, abdomen and pelvis was performed  Multiplanar 2D reformatted images were created from the source data  Radiation dose length product (DLP) for this visit:  2294 92 mGy-cm     This examination, like all CT scans performed in the [facility], was performed utilizing techniques to minimize radiation dose exposure, including the use of   iterative reconstruction and automated exposure control  IV Contrast:  100 mL of iohexol (OMNIPAQUE)  Enteric Contrast: Enteric contrast was administered  FINDINGS:     CHEST     LUNGS:  Faint groundglass opacities are seen scattered throughout both lungs, these are not significantly changed from most recent comparison CT  There is no evidence of discrete tracheal or endobronchial lesion  PLEURA:  Unremarkable  HEART/GREAT VESSELS: Heart is unremarkable for patient's age  No thoracic aortic aneurysm  MEDIASTINUM AND MAGALYS:  Unremarkable  CHEST WALL AND LOWER NECK:  Postsurgical changes/scarring is seen within the upper back, just to the left of midline  These changes are stable in appearance from prior CT of September 2022  No new appreciable nodularity  Surgical clips are noted   within the left axilla  ABDOMEN     LIVER/BILIARY TREE:  Circumscribed hypodensity within segment 4A measuring 1 2 cm is stable from prior exam (3:107)  This hypodensity is enlarged from more remote comparison study of January 2022 where it measured 7 mm  There are geographic areas of   hypoattenuation within the liver parenchyma which are similar to prior examination, likely representing steatosis  GALLBLADDER:  No calcified gallstones  No pericholecystic inflammatory change  SPLEEN:  Unremarkable  PANCREAS:  Unremarkable  ADRENAL GLANDS:  Unremarkable  KIDNEYS/URETERS:  No hydronephrosis  Left renal cyst is noted  STOMACH AND BOWEL:  No evidence of small bowel obstruction  There is diverticulosis without evidence of focal inflammatory changes  APPENDIX:  No findings to suggest appendicitis  ABDOMINOPELVIC CAVITY:  No ascites  No pneumoperitoneum  No lymphadenopathy  VESSELS:  Unremarkable for patient's age       PELVIS     REPRODUCTIVE ORGANS:  Prostate is unremarkable for patient's age  Trace bilateral hydroceles are noted  URINARY BLADDER:  Bladder wall is mildly thickened in appearance, this is favored to be due to incomplete distention  ABDOMINAL WALL/INGUINAL REGIONS:  Unremarkable  OSSEOUS STRUCTURES:  No acute fracture or destructive osseous lesion  Pars interarticularis defects are noted at the L3 level  IMPRESSION:  1  No CT evidence of adenopathy or other new suspicious findings within the chest, abdomen, and pelvis  2   Subtle scattered groundglass opacities within the bilateral lungs are not significantly changed from most recent comparison CT of September 2022   3   Circumscribed hypodensity measuring 1 2 cm within segment 4 of the liver with central hypoattenuation most compatible with a hepatic cyst   This is stable in size from CT of September 2022 but is noted to be slightly enlarged from more remote   comparison exam of January 2022  Workstation performed: [id number]     Workstation performed: [id number]  I reviewed the above laboratory and imaging data  Discussion/Summary: 39-year-old man with history of stage II melanoma presently JEFF  CAT scan, blood work and ultrasound with MSLT to protocol negative for recurrent disease  Plan on follow-up in 6 months repeat blood work at that time

## 2023-07-10 DIAGNOSIS — I10 HYPERTENSION, UNSPECIFIED TYPE: ICD-10-CM

## 2023-07-10 DIAGNOSIS — I10 PRIMARY HYPERTENSION: ICD-10-CM

## 2023-07-10 RX ORDER — LISINOPRIL 20 MG/1
20 TABLET ORAL DAILY
Qty: 90 TABLET | Refills: 0 | Status: SHIPPED | OUTPATIENT
Start: 2023-07-10

## 2023-07-10 RX ORDER — AMLODIPINE BESYLATE 5 MG/1
5 TABLET ORAL DAILY
Qty: 90 TABLET | Refills: 0 | Status: SHIPPED | OUTPATIENT
Start: 2023-07-10 | End: 2023-10-08

## 2023-07-12 ENCOUNTER — TELEPHONE (OUTPATIENT)
Dept: HEMATOLOGY ONCOLOGY | Facility: CLINIC | Age: 48
End: 2023-07-12

## 2023-07-12 NOTE — TELEPHONE ENCOUNTER
Appointment Change  Cancel, Reschedule, Change to Virtual      Who are you speaking with? Patient   If it is not the patient, are they listed on an active communication consent form? N/A   Which provider is the appointment scheduled with? Dr. Kathy Thompson   When is the appointment scheduled? Please list date and time  7/19/23 @ 8:40am   At which location is the appointment scheduled to take place? [facility]   Was the appointment rescheduled or changed from an in person visit to a virtual visit? If so, please list the details of the change. Yes 9/13/23 @ 2:40pm   What is the reason for the appointment change? Patient has work schedule change     Was STAR transport scheduled for this visit? no   Does STAR transport need to be scheduled for the new visit (if applicable) no   Does the patient need an infusion appointment rescheduled? no   Does the patient have an infusion appointment scheduled? If so, when? no   Is the patient undergoing chemotherapy? no   Was the no-show policy reviewed for appointments being changed with less then 24 hours of notice?  yes

## 2023-09-07 ENCOUNTER — TELEPHONE (OUTPATIENT)
Dept: HEMATOLOGY ONCOLOGY | Facility: CLINIC | Age: 48
End: 2023-09-07

## 2023-09-08 ENCOUNTER — TELEPHONE (OUTPATIENT)
Dept: HEMATOLOGY ONCOLOGY | Facility: CLINIC | Age: 48
End: 2023-09-08

## 2023-09-08 NOTE — TELEPHONE ENCOUNTER
Appointment Change  Cancel, Reschedule, Change to Virtual      Who are you speaking with? self   If it is not the patient, is the caller listed on the communication consent form? self   Which provider is the appointment scheduled with? Bud Sheridan   When was the original appointment scheduled? Please list date and time 9/13   At which location is the appointment scheduled to take place? [facility]   Was the appointment rescheduled? Was the appointment changed from an in person visit to a virtual visit? If so, please list the details of the change. Yes, 11/13 8:40am   What is the reason for the appointment change? job       Was STAR transport scheduled? no   Does STAR transport need to be scheduled for the new visit (if applicable) no   Does the patient need an infusion appointment rescheduled? no   Does the patient have an upcoming infusion appointment scheduled? If so, when? no   Is the patient undergoing chemotherapy? no   For appointments cancelled with less than 24 hours:  Was the no-show policy reviewed?  yes

## 2023-10-16 DIAGNOSIS — I10 HYPERTENSION, UNSPECIFIED TYPE: ICD-10-CM

## 2023-10-16 DIAGNOSIS — I10 PRIMARY HYPERTENSION: ICD-10-CM

## 2023-10-16 DIAGNOSIS — F41.9 ANXIETY: ICD-10-CM

## 2023-10-16 RX ORDER — AMLODIPINE BESYLATE 5 MG/1
5 TABLET ORAL DAILY
Qty: 90 TABLET | Refills: 0 | Status: SHIPPED | OUTPATIENT
Start: 2023-10-16 | End: 2024-01-14

## 2023-10-16 RX ORDER — LISINOPRIL 20 MG/1
20 TABLET ORAL DAILY
Qty: 90 TABLET | Refills: 0 | Status: SHIPPED | OUTPATIENT
Start: 2023-10-16

## 2023-11-08 ENCOUNTER — APPOINTMENT (OUTPATIENT)
Dept: LAB | Facility: HOSPITAL | Age: 48
End: 2023-11-08
Payer: COMMERCIAL

## 2023-11-08 DIAGNOSIS — Z85.820 ENCOUNTER FOR FOLLOW-UP SURVEILLANCE OF MELANOMA: ICD-10-CM

## 2023-11-08 DIAGNOSIS — C43.59 MALIGNANT MELANOMA OF UPPER BACK (HCC): ICD-10-CM

## 2023-11-08 DIAGNOSIS — Z08 ENCOUNTER FOR FOLLOW-UP SURVEILLANCE OF MELANOMA: ICD-10-CM

## 2023-11-08 DIAGNOSIS — Z85.820 HISTORY OF MELANOMA: ICD-10-CM

## 2023-11-08 LAB
ALBUMIN SERPL BCP-MCNC: 4.5 G/DL (ref 3.5–5)
ALP SERPL-CCNC: 53 U/L (ref 34–104)
ALT SERPL W P-5'-P-CCNC: 34 U/L (ref 7–52)
ANION GAP SERPL CALCULATED.3IONS-SCNC: 9 MMOL/L
AST SERPL W P-5'-P-CCNC: 26 U/L (ref 13–39)
BASOPHILS # BLD AUTO: 0.01 THOUSANDS/ÂΜL (ref 0–0.1)
BASOPHILS NFR BLD AUTO: 0 % (ref 0–1)
BILIRUB SERPL-MCNC: 0.68 MG/DL (ref 0.2–1)
BUN SERPL-MCNC: 23 MG/DL (ref 5–25)
CALCIUM SERPL-MCNC: 9.4 MG/DL (ref 8.4–10.2)
CHLORIDE SERPL-SCNC: 104 MMOL/L (ref 96–108)
CO2 SERPL-SCNC: 22 MMOL/L (ref 21–32)
CREAT SERPL-MCNC: 1.41 MG/DL (ref 0.6–1.3)
EOSINOPHIL # BLD AUTO: 0.04 THOUSAND/ÂΜL (ref 0–0.61)
EOSINOPHIL NFR BLD AUTO: 1 % (ref 0–6)
ERYTHROCYTE [DISTWIDTH] IN BLOOD BY AUTOMATED COUNT: 12.8 % (ref 11.6–15.1)
GFR SERPL CREATININE-BSD FRML MDRD: 57 ML/MIN/1.73SQ M
GLUCOSE P FAST SERPL-MCNC: 107 MG/DL (ref 65–99)
HCT VFR BLD AUTO: 46.4 % (ref 36.5–49.3)
HGB BLD-MCNC: 15.1 G/DL (ref 12–17)
IMM GRANULOCYTES # BLD AUTO: 0.01 THOUSAND/UL (ref 0–0.2)
IMM GRANULOCYTES NFR BLD AUTO: 0 % (ref 0–2)
LDH SERPL-CCNC: 154 U/L (ref 140–271)
LYMPHOCYTES # BLD AUTO: 1.44 THOUSANDS/ÂΜL (ref 0.6–4.47)
LYMPHOCYTES NFR BLD AUTO: 33 % (ref 14–44)
MCH RBC QN AUTO: 30.9 PG (ref 26.8–34.3)
MCHC RBC AUTO-ENTMCNC: 32.5 G/DL (ref 31.4–37.4)
MCV RBC AUTO: 95 FL (ref 82–98)
MONOCYTES # BLD AUTO: 0.3 THOUSAND/ÂΜL (ref 0.17–1.22)
MONOCYTES NFR BLD AUTO: 7 % (ref 4–12)
NEUTROPHILS # BLD AUTO: 2.55 THOUSANDS/ÂΜL (ref 1.85–7.62)
NEUTS SEG NFR BLD AUTO: 59 % (ref 43–75)
NRBC BLD AUTO-RTO: 0 /100 WBCS
PLATELET # BLD AUTO: 181 THOUSANDS/UL (ref 149–390)
PMV BLD AUTO: 10.7 FL (ref 8.9–12.7)
POTASSIUM SERPL-SCNC: 4.1 MMOL/L (ref 3.5–5.3)
PROT SERPL-MCNC: 7.6 G/DL (ref 6.4–8.4)
RBC # BLD AUTO: 4.89 MILLION/UL (ref 3.88–5.62)
SODIUM SERPL-SCNC: 135 MMOL/L (ref 135–147)
WBC # BLD AUTO: 4.35 THOUSAND/UL (ref 4.31–10.16)

## 2023-11-08 PROCEDURE — 80053 COMPREHEN METABOLIC PANEL: CPT

## 2023-11-08 PROCEDURE — 83625 ASSAY OF LDH ENZYMES: CPT

## 2023-11-08 PROCEDURE — 83615 LACTATE (LD) (LDH) ENZYME: CPT

## 2023-11-08 PROCEDURE — 85025 COMPLETE CBC W/AUTO DIFF WBC: CPT

## 2023-11-08 PROCEDURE — 36415 COLL VENOUS BLD VENIPUNCTURE: CPT

## 2023-11-09 ENCOUNTER — TELEPHONE (OUTPATIENT)
Dept: HEMATOLOGY ONCOLOGY | Facility: CLINIC | Age: 48
End: 2023-11-09

## 2023-11-09 DIAGNOSIS — F41.9 ANXIETY: ICD-10-CM

## 2023-11-09 LAB
LDH SERPL-CCNC: 167 IU/L (ref 121–224)
LDH1 CFR SERPL ELPH: 18 % (ref 17–32)
LDH2 CFR SERPL ELPH: 33 % (ref 25–40)
LDH3 CFR SERPL ELPH: 25 % (ref 17–27)
LDH4 CFR SERPL ELPH: 11 % (ref 5–13)
LDH5 CFR SERPL ELPH: 13 % (ref 4–20)

## 2023-11-09 NOTE — TELEPHONE ENCOUNTER
Appointment Change  Cancel, Reschedule, Change to Virtual      Who are you speaking with? Patient   If it is not the patient, is the caller listed on the communication consent form? Yes   Which provider is the appointment scheduled with? LESTER Mariano   When was the original appointment scheduled? Please list date and time 12/15   At which location is the appointment scheduled to take place? [place]   Was the appointment rescheduled? Was the appointment changed from an in person visit to a virtual visit? If so, please list the details of the change. 1/18 10am   What is the reason for the appointment change? travel       Was STAR transport scheduled? No   Does STAR transport need to be scheduled for the new visit (if applicable) No   Does the patient need an infusion appointment rescheduled? No   Does the patient have an upcoming infusion appointment scheduled? If so, when? No   Is the patient undergoing chemotherapy? No   For appointments cancelled with less than 24 hours:  Was the no-show policy reviewed?  Yes

## 2023-11-13 ENCOUNTER — OFFICE VISIT (OUTPATIENT)
Dept: HEMATOLOGY ONCOLOGY | Facility: CLINIC | Age: 48
End: 2023-11-13
Payer: COMMERCIAL

## 2023-11-13 VITALS
WEIGHT: 206.5 LBS | DIASTOLIC BLOOD PRESSURE: 86 MMHG | SYSTOLIC BLOOD PRESSURE: 126 MMHG | TEMPERATURE: 98.1 F | OXYGEN SATURATION: 99 % | BODY MASS INDEX: 31.3 KG/M2 | HEIGHT: 68 IN | HEART RATE: 86 BPM | RESPIRATION RATE: 16 BRPM

## 2023-11-13 DIAGNOSIS — Z08 ENCOUNTER FOR FOLLOW-UP SURVEILLANCE OF MELANOMA: ICD-10-CM

## 2023-11-13 DIAGNOSIS — C43.59 MALIGNANT MELANOMA OF UPPER BACK (HCC): Primary | ICD-10-CM

## 2023-11-13 DIAGNOSIS — Z85.820 ENCOUNTER FOR FOLLOW-UP SURVEILLANCE OF MELANOMA: ICD-10-CM

## 2023-11-13 DIAGNOSIS — C77.9 MELANOMA METASTATIC TO LYMPH NODE (HCC): ICD-10-CM

## 2023-11-13 PROCEDURE — 99214 OFFICE O/P EST MOD 30 MIN: CPT | Performed by: INTERNAL MEDICINE

## 2023-11-13 NOTE — LETTER
November 27, 2023     Mario Alberto Kapadia MD  [address]  [address] [facility]    Patient: Siva Fontana   YOB: 1975   Date of Visit: 11/13/2023       Dear Dr. Lisa Maria: Thank you for referring Eve Orantes to me for evaluation. Below are my notes for this consultation. If you have questions, please do not hesitate to call me. I look forward to following your patient along with you. Sincerely,        Abril Ramírez MD        CC: DO Deonna Costa, Renetta Villaseñor MD  11/27/2023  4:56 AM  Sign when Signing Visit  [address] [facility] [address]  [address]  [phone]     Date of Visit: 11/13/2023  Name: Siva Fontana   YOB: 1975        Subjective    VISIT DIAGNOSIS:  Diagnoses and all orders for this visit:    Malignant melanoma of upper back ([address])  -     CBC and differential; Future  -     Comprehensive metabolic panel; Future  -     LD,Blood; Future    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 (HCC)  -     CBC and differential; Future  -     Comprehensive metabolic panel; Future  -     LD,Blood; Future    Encounter for follow-up surveillance of melanoma        Oncology History   Malignant melanoma of upper back ([address])   12/2020 Biopsy    Left upper back shave biopsy:  - Malignant melanoma 2.3mm    Mitosis: 6  Ulceration: not identified     2/2/2021 Surgery    Wide excision with sentinel lymph node biopsy  Lymph Node, [place], number 1, biopsy:  - Metastatic melanoma in one lymph node (1/1, larger focus approximately 1.3 mm; subcapsular and intraparenchyma), no extranodal extension      Skin, left back, excision:  - Residual melanoma, 1.3 mm; prior biopsy site reaction.  - Inked margins with no tumor seen.         Lymph Node, [place], sentinel node #2, biopsy:  - Metastatic melanoma in one of ten lymph nodes (1/10, single and small nests of cells, subcapsular, larger focus 0.11 mm); no extranodal extension. 2/2/2021 -  Cancer Staged    Staging form: Melanoma of the Skin, AJCC 8th Edition  - Pathologic stage from 2/2/2021: Stage IIIB (pT3a, pN2a, cM0) - Signed by Aranza Cortez MD on 2/4/2022  Stage prefix: Initial diagnosis       4/8/2021 Genomic Testing    Decision DX: Class 1B     4/2021 - 4/2022 Chemotherapy     Tafinalar 150 mg b.i.d. Mekinist 2 mg p.o. Daily        Cancer Staging   Malignant melanoma of upper back [facility])  Staging form: Melanoma of the Skin, AJCC 8th Edition  - Pathologic stage from 2/2/2021: Stage IIIB (pT3a, pN2a, cM0) - Signed by Aranza Cortez MD on 2/4/2022          HISTORY OF PRESENT ILLNESS: Joslyn Negro is a 46 y.o. male  who has stage IIIb melanoma status post 1 year of adjuvant treatment with dabrafenib and trametinib completing treatment in April 2022 here for continued monitoring, follow-up, and surveillance. He is doing well feels good. No issues concerns or complaints at this time. Was not able to get imaging completed prior to his visit today. Denies any new, changing, concerning skin lesions. Denies any lymphadenopathy. He did have imaging of his left axilla in April that was negative for suspicious lymph nodes at that time. REVIEW OF SYSTEMS:  Review of Systems   Constitutional:  Negative for appetite change, fatigue, fever and unexpected weight change. HENT:   Negative for lump/mass, trouble swallowing and voice change. Eyes:  Negative for icterus. Respiratory:  Negative for cough, shortness of breath and wheezing. Cardiovascular:  Negative for leg swelling. Gastrointestinal:  Negative for abdominal pain, constipation, diarrhea, nausea and vomiting. Genitourinary:  Negative for difficulty urinating and hematuria. Musculoskeletal:  Negative for arthralgias, gait problem and myalgias. Skin:  Negative for itching and rash. No new, changing, or concerning lesions.    Neurological:  Negative for extremity weakness, gait problem, headaches, light-headedness and numbness. Hematological:  Negative for adenopathy. MEDICATIONS:    Current Outpatient Medications:   •  amLODIPine (NORVASC) 5 mg tablet, Take 1 tablet (5 mg total) by mouth daily, Disp: 90 tablet, Rfl: 0  •  lisinopril (ZESTRIL) 20 mg tablet, Take 1 tablet (20 mg total) by mouth daily, Disp: 90 tablet, Rfl: 0  •  sertraline (ZOLOFT) 50 mg tablet, Take 1 tablet (50 mg total) by mouth daily, Disp: 30 tablet, Rfl: 2     ALLERGIES:  No Known Allergies     ACTIVE PROBLEMS:  Patient Active Problem List   Diagnosis   • Malignant melanoma of upper back (HCC)   • 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 (HCC)   • Primary hypertension   • Anxiety   • Mixed hyperlipidemia   • Hypertriglyceridemia   • Encounter for screening colonoscopy   • Encounter for follow-up surveillance of melanoma          PAST MEDICAL HISTORY:   History reviewed. No pertinent past medical history. PAST SURGICAL HISTORY:  Past Surgical History:   Procedure Laterality Date   • LYMPH NODE BIOPSY N/A 2/2/2021    Procedure: BIOPSY LYMPH NODE LEFT AXILLARY SENTINEL;  Surgeon: Wayne Bowen MD;  Location: [facility];  Service: Surgical Oncology   • NO PAST SURGERIES     • SKIN LESION EXCISION Left 2/2/2021    Procedure: WIDE EXCISION MELANOMA SCAR UPPER BACK, intraoperative lymphatic mapping, sentinel lymph node biopsy; [address];  Surgeon: Wayne Bowen MD;  Location: [facility];  Service: Surgical Oncology        SOCIAL HISTORY:  Social History     Socioeconomic History   • Marital status: /Civil Union     Spouse name: None   • Number of children: None   • Years of education: None   • Highest education level: None   Occupational History   • None   Tobacco Use   • Smoking status: Never   • Smokeless tobacco: Never   Vaping Use   • Vaping Use: Never used   Substance and Sexual Activity   • Alcohol use:  Yes   • Drug use: Never   • Sexual activity: None   Other Topics Concern   • None   Social History Narrative   • None     Social Determinants of Health     Financial Resource Strain: Not on file   Food Insecurity: Not on file   Transportation Needs: Not on file   Physical Activity: Not on file   Stress: Not on file   Social Connections: Not on file   Intimate Partner Violence: Not on file   Housing Stability: Not on file        FAMILY HISTORY:  Family History   Problem Relation Age of Onset   • Basal cell carcinoma Father    • No Known Problems Mother    • Breast cancer Paternal Aunt 43   • Breast cancer Paternal Grandmother 80           Objective    PHYSICAL EXAMINATION:   Blood pressure 126/86, pulse 86, temperature 98.1 °F (36.7 °C), temperature source Temporal, resp. rate 16, height 5' 8" (1.727 m), weight 93.7 kg (206 lb 8 oz), SpO2 99 %. Pain Score: 0-No pain      ECOG Performance Status      Flowsheet Row Most Recent Value   ECOG Performance Status 0 - Fully active, able to carry on all pre-disease performance without restriction              Physical Exam  Constitutional:       General: He is not in acute distress. Appearance: Normal appearance. He is not ill-appearing or toxic-appearing. HENT:      Head: Normocephalic and atraumatic. Right Ear: External ear normal.      Left Ear: External ear normal.      Nose: Nose normal.      Mouth/Throat:      Mouth: Mucous membranes are moist.      Pharynx: Oropharynx is clear. Eyes:      General: No scleral icterus. Right eye: No discharge. Left eye: No discharge. Conjunctiva/sclera: Conjunctivae normal.   Cardiovascular:      Rate and Rhythm: Normal rate and regular rhythm. Pulses: Normal pulses. Heart sounds: No murmur heard. No friction rub. No gallop. Pulmonary:      Effort: Pulmonary effort is normal. No respiratory distress. Breath sounds: Normal breath sounds. No wheezing or rales. Abdominal:      General: Bowel sounds are normal. There is no distension. Palpations:  There is no mass. Tenderness: There is no abdominal tenderness. There is no rebound. Musculoskeletal:         General: No swelling or tenderness. Cervical back: Normal range of motion. No rigidity. Right lower leg: No edema. Left lower leg: No edema. Lymphadenopathy:      Head:      Right side of head: No submandibular, preauricular or posterior auricular adenopathy. Left side of head: No submandibular, preauricular or posterior auricular adenopathy. Cervical: No cervical adenopathy. Right cervical: No superficial or posterior cervical adenopathy. Left cervical: No superficial or posterior cervical adenopathy. Upper Body:      Right upper body: No supraclavicular or axillary adenopathy. Left upper body: No supraclavicular or axillary adenopathy. Lower Body: No right inguinal adenopathy. No left inguinal adenopathy. Skin:     General: Skin is warm. Coloration: Skin is not jaundiced. Findings: No lesion or rash. Comments: Well healed surgical scar. No evidence of recurrence at primary site. Neurological:      General: No focal deficit present. Mental Status: He is alert and oriented to person, place, and time. Cranial Nerves: No cranial nerve deficit. Motor: No weakness. Gait: Gait normal.   Psychiatric:         Mood and Affect: Mood normal.         Behavior: Behavior normal.         Thought Content: Thought content normal.         Judgment: Judgment normal.         I reviewed lab data in the chart.     WBC   Date Value Ref Range Status   11/08/2023 4.35 4.31 - 10.16 Thousand/uL Final   04/07/2023 4.04 (L) 4.31 - 10.16 Thousand/uL Final   01/04/2023 5.30 4.31 - 10.16 Thousand/uL Final     Hemoglobin   Date Value Ref Range Status   11/08/2023 15.1 12.0 - 17.0 g/dL Final   04/07/2023 15.0 12.0 - 17.0 g/dL Final   01/04/2023 14.6 12.0 - 17.0 g/dL Final     Platelets   Date Value Ref Range Status   11/08/2023 181 149 - 390 Thousands/uL Final   04/07/2023 164 149 - 390 Thousands/uL Final   01/04/2023 161 149 - 390 Thousands/uL Final     MCV   Date Value Ref Range Status   11/08/2023 95 82 - 98 fL Final   04/07/2023 97 82 - 98 fL Final   01/04/2023 97 82 - 98 fL Final      Potassium   Date Value Ref Range Status   11/08/2023 4.1 3.5 - 5.3 mmol/L Final   04/07/2023 4.1 3.5 - 5.3 mmol/L Final   01/04/2023 3.9 3.5 - 5.3 mmol/L Final     Chloride   Date Value Ref Range Status   11/08/2023 104 96 - 108 mmol/L Final   04/07/2023 101 96 - 108 mmol/L Final   01/04/2023 100 96 - 108 mmol/L Final     CO2   Date Value Ref Range Status   11/08/2023 22 21 - 32 mmol/L Final   04/07/2023 25 21 - 32 mmol/L Final   01/04/2023 26 21 - 32 mmol/L Final     BUN   Date Value Ref Range Status   11/08/2023 23 5 - 25 mg/dL Final   04/07/2023 20 5 - 25 mg/dL Final   01/04/2023 20 5 - 25 mg/dL Final     Creatinine   Date Value Ref Range Status   11/08/2023 1.41 (H) 0.60 - 1.30 mg/dL Final     Comment:     Standardized to IDMS reference method   04/07/2023 1.30 0.60 - 1.30 mg/dL Final     Comment:     Standardized to IDMS reference method   01/04/2023 1.29 0.60 - 1.30 mg/dL Final     Comment:     Standardized to IDMS reference method     Calcium   Date Value Ref Range Status   11/08/2023 9.4 8.4 - 10.2 mg/dL Final   04/07/2023 9.7 8.4 - 10.2 mg/dL Final   01/04/2023 9.2 8.4 - 10.2 mg/dL Final     Albumin   Date Value Ref Range Status   11/08/2023 4.5 3.5 - 5.0 g/dL Final   04/07/2023 4.6 3.5 - 5.0 g/dL Final   01/04/2023 4.3 3.5 - 5.0 g/dL Final     Total Bilirubin   Date Value Ref Range Status   11/08/2023 0.68 0.20 - 1.00 mg/dL Final     Comment:     Use of this assay is not recommended for patients undergoing treatment with eltrombopag due to the potential for falsely elevated results. N-acetyl-p-benzoquinone imine (metabolite of Acetaminophen) will generate erroneously low results in samples for patients that have taken an overdose of Acetaminophen. 04/07/2023 0.77 0.20 - 1.00 mg/dL Final     Comment:     Use of this assay is not recommended for patients undergoing treatment with eltrombopag due to the potential for falsely elevated results. N-acetyl-p-benzoquinone imine (metabolite of Acetaminophen) will generate erroneously low results in samples for patients that have taken an overdose of Acetaminophen. 01/04/2023 0.64 0.20 - 1.00 mg/dL Final     Alkaline Phosphatase   Date Value Ref Range Status   11/08/2023 53 34 - 104 U/L Final   04/07/2023 38 34 - 104 U/L Final   01/04/2023 52 34 - 104 U/L Final     AST   Date Value Ref Range Status   11/08/2023 26 13 - 39 U/L Final   04/07/2023 32 13 - 39 U/L Final   01/04/2023 30 13 - 39 U/L Final     Comment:     Specimen collection should occur prior to Sulfasalazine administration due to the potential for falsely depressed results. ALT   Date Value Ref Range Status   11/08/2023 34 7 - 52 U/L Final     Comment:     Specimen collection should occur prior to Sulfasalazine administration due to the potential for falsely depressed results. 04/07/2023 49 7 - 52 U/L Final     Comment:     Specimen collection should occur prior to Sulfasalazine administration due to the potential for falsely depressed results. 01/04/2023 39 7 - 52 U/L Final     Comment:     Specimen collection should occur prior to Sulfasalazine administration due to the potential for falsely depressed results. LD   Date Value Ref Range Status   11/08/2023 154 140 - 271 U/L Final   04/07/2023 162 140 - 271 U/L Final   01/04/2023 180 140 - 271 U/L Final     LDH   Date Value Ref Range Status   11/08/2023 167 121 - 224 IU/L Final   04/07/2023 161 121 - 224 IU/L Final     No results found for: "TSH"  No results found for: "M3KVWOJ"   No results found for: "FREET4"      RECENT IMAGING:  No results found.           Assessment/Plan  Mr. Paulette Mejía is a 49-year-old gentleman with stage IIIb melanoma status post 1 year of adjuvant treatment with dabrafenib and trametinib here for continued monitoring, follow-up, and surveillance. 1. Malignant melanoma of upper back ([address])  2. 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 ([address])  3. Encounter for follow-up surveillance of melanoma  He is doing well we discussed there is no clinical evidence of melanoma recurrence or metastasis. Labs reviewed and okay. He will work to get his current imaging scheduled as soon as possible that fits into his work schedule. He knows to continue to monitor for any changes or new nodules/masses or lymphadenopathy. We did discuss that he is approximately 2 and half years out from diagnosis and that we continue to monitor him closely for disease recurrence. He already has orders for imaging and he will use these orders to schedule the appointments. Orders placed and prescription divided for blood work to be done at follow-up in 3 months. He knows to call with issues or concerns prior to his next visit. - CBC and differential; Future  - Comprehensive metabolic panel; Future  - LD,Blood; Future        Return in about 3 months (around 2/13/2024) for Office Visit, labs.  My Note        Keisha MD Albert, PhD

## 2023-11-17 ENCOUNTER — OFFICE VISIT (OUTPATIENT)
Dept: FAMILY MEDICINE CLINIC | Facility: CLINIC | Age: 48
End: 2023-11-17

## 2023-11-17 VITALS
SYSTOLIC BLOOD PRESSURE: 128 MMHG | WEIGHT: 207 LBS | TEMPERATURE: 96.6 F | HEART RATE: 80 BPM | BODY MASS INDEX: 31.37 KG/M2 | DIASTOLIC BLOOD PRESSURE: 74 MMHG | HEIGHT: 68 IN | OXYGEN SATURATION: 96 %

## 2023-11-17 DIAGNOSIS — Z85.820 HISTORY OF MELANOMA: ICD-10-CM

## 2023-11-17 DIAGNOSIS — E78.2 MIXED HYPERLIPIDEMIA: ICD-10-CM

## 2023-11-17 DIAGNOSIS — Z23 ENCOUNTER FOR IMMUNIZATION: ICD-10-CM

## 2023-11-17 DIAGNOSIS — F41.9 ANXIETY: ICD-10-CM

## 2023-11-17 DIAGNOSIS — I10 PRIMARY HYPERTENSION: Primary | ICD-10-CM

## 2023-11-17 DIAGNOSIS — R73.01 ELEVATED FASTING GLUCOSE: ICD-10-CM

## 2023-11-17 DIAGNOSIS — R79.89 ELEVATED SERUM CREATININE: ICD-10-CM

## 2023-11-17 NOTE — PROGRESS NOTES
Name: Tim Krishna      : 1975      MRN: [id number]  Encounter Provider: LESTER Bass  Encounter Date: 2023   Encounter department: [address] [place] [address]     1. Primary hypertension  Comments:  Stable. 128/74 in office today. Continue Lisinopril 20 mg daily & Amlodipine 5 mg daily. F/u in 6 months. 2. Anxiety  Comments:  Stable. Continue Zoloft 50 mg daily. F/u in 6 months. 3. Mixed hyperlipidemia  Comments:  Hx of HLD - not on medication. Trend has been high over last 6 years: [phone]. Most recent . Recheck labs in 2 weeks with creatinine. Orders:  -     Lipid panel; Future; Expected date: 2023    4. History of melanoma  Comments: Follows with [facility] for surveillance. 5. Encounter for immunization  -     Pneumococcal Conjugate Vaccine 20-valent (PCV20)  -     influenza vaccine, quadrivalent, 0.5 mL, preservative-free, for adult and pediatric patients 6 mos+ (AFLURIA, FLUARIX, FLULAVAL, FLUZONE)    6. Elevated serum creatinine  Comments:  Cr 1.41 this week. Patient attributes to dehdyration. Discussed adequate hydration and avoiding excessive NSAID use. Recheck in 2 weeks. Orders:  -     Comprehensive metabolic panel; Future; Expected date: 2023    7. Elevated fasting glucose  -     HEMOGLOBIN A1C W/ EAG ESTIMATION; Future; Expected date: 2023    Hypertension stable - continue current regimen. Labs reviewed from 2023 and most recent labs from [facility] last week. Recheck creatinine in 2 weeks. Cholesterol trending high for years. Recheck with chemistry panel - if high, consideration for statin. Fasting glucose trends slightly high as well - recheck A1c. F/u in 6 months for HTN & anxiety recheck. BMI Counseling: Body mass index is 31.47 kg/m².  The BMI is above normal. Nutrition recommendations include decreasing portion sizes, encouraging healthy choices of fruits and vegetables, decreasing fast food intake, consuming healthier snacks, limiting drinks that contain sugar, moderation in carbohydrate intake, increasing intake of lean protein, reducing intake of saturated and trans fat and reducing intake of cholesterol. Exercise recommendations include moderate physical activity 150 minutes/week and exercising 3-5 times per week. No pharmacotherapy was ordered. Patient referred to PCP. Rationale for BMI follow-up plan is due to patient being overweight or obese. Depression Screening and Follow-up Plan: Patient was screened for depression during today's encounter. They screened negative with a PHQ-2 score of 0. Elgin Beckwith presents today for 6 month follow up on hypertension. This is my first encounter with the patient, previously seen by Dr. Rayshawn Bloom. 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 He reports feeling well overall and denies CP, SOB, visual changes, headache, palpitations, numbness/tingling, and weakness. He is taking his BP medication as prescribed: Lisinopril 20 mg daily & Norvasc 5 mg daily. He reports in the past he was taking Norvasc PRN when he would get a higher reading on his cuff. Most recently, he reports getting pressures in the 120s/80s at home consistently with the same cuff. Laith Lezama is following with [facility] for malignant melanoma of his back and lymph. They recently ordered labs on 11/13/23 which showed an elevated creatinine of 1.41. Patient states he believes this is due to dehydration and he admits to barely drinking water throughout the day. He denies excessive NSAID use or history of kidney disease. PMH also includes anxiety for which he takes Zoloft 50 mg daily. He reports feeling stable on this dose and denies SI/HI. He denies depressive symptoms. Review of Systems   Constitutional:  Negative for chills, fatigue and fever.    HENT:  Negative for congestion, ear discharge, ear pain, facial swelling, rhinorrhea, sinus pressure, sinus pain, sore throat and trouble swallowing. Eyes:  Negative for photophobia, pain and visual disturbance. Respiratory:  Negative for cough, chest tightness, shortness of breath and wheezing. Cardiovascular:  Negative for chest pain, palpitations and leg swelling. Gastrointestinal:  Negative for abdominal pain, diarrhea, nausea and vomiting. Genitourinary:  Negative for dysuria, flank pain and hematuria. Musculoskeletal:  Negative for arthralgias, back pain, myalgias and neck pain. Skin:  Negative for pallor and wound. Neurological:  Negative for dizziness, syncope, weakness, numbness and headaches. Psychiatric/Behavioral:  Negative for confusion and sleep disturbance. All other systems reviewed and are negative. Current Outpatient Medications on File Prior to Visit   Medication Sig    amLODIPine (NORVASC) 5 mg tablet Take 1 tablet (5 mg total) by mouth daily    lisinopril (ZESTRIL) 20 mg tablet Take 1 tablet (20 mg total) by mouth daily    sertraline (ZOLOFT) 50 mg tablet Take 1 tablet by mouth once daily       Objective     /74   Pulse 80   Temp (!) 96.6 °F (35.9 °C)   Ht 5' 8" (1.727 m)   Wt 93.9 kg (207 lb)   SpO2 96%   BMI 31.47 kg/m²     Physical Exam  Vitals reviewed. Constitutional:       General: He is not in acute distress. Appearance: Normal appearance. He is well-developed and normal weight. He is not ill-appearing or toxic-appearing. HENT:      Head: Normocephalic. Right Ear: Tympanic membrane normal. No middle ear effusion. Tympanic membrane is not erythematous or bulging. Left Ear: Tympanic membrane normal.  No middle ear effusion. Tympanic membrane is not erythematous or bulging. Nose: Nose normal. No congestion or rhinorrhea. Right Sinus: No maxillary sinus tenderness or frontal sinus tenderness. Left Sinus: No maxillary sinus tenderness or frontal sinus tenderness.       Mouth/Throat:      Mouth: Mucous membranes are moist. Pharynx: Oropharynx is clear. Uvula midline. No oropharyngeal exudate, posterior oropharyngeal erythema or uvula swelling. Tonsils: No tonsillar exudate or tonsillar abscesses. Eyes:      Extraocular Movements: Extraocular movements intact. Conjunctiva/sclera: Conjunctivae normal.      Pupils: Pupils are equal, round, and reactive to light. Neck:      Thyroid: No thyroid mass. Cardiovascular:      Rate and Rhythm: Normal rate and regular rhythm. Pulses: Normal pulses. Heart sounds: Normal heart sounds. Pulmonary:      Effort: Pulmonary effort is normal. No tachypnea or respiratory distress. Breath sounds: Normal breath sounds and air entry. No decreased breath sounds, wheezing, rhonchi or rales. Chest:      Chest wall: No tenderness. Abdominal:      General: Bowel sounds are normal. There is no distension. Palpations: Abdomen is soft. Tenderness: There is no abdominal tenderness. There is no right CVA tenderness, left CVA tenderness, guarding or rebound. Musculoskeletal:         General: Normal range of motion. Cervical back: Normal range of motion and neck supple. Right lower leg: No edema. Left lower leg: No edema. Lymphadenopathy:      Cervical: No cervical adenopathy. Skin:     General: Skin is warm and dry. Capillary Refill: Capillary refill takes less than 2 seconds. Findings: No rash. Neurological:      General: No focal deficit present. Mental Status: He is alert and oriented to person, place, and time. Cranial Nerves: No cranial nerve deficit. Sensory: Sensation is intact. Motor: Motor function is intact. Coordination: Coordination is intact. Gait: Gait is intact. Deep Tendon Reflexes: Reflexes are normal and symmetric.    Psychiatric:         Attention and Perception: Attention normal.         Mood and Affect: Mood normal.         Speech: Speech normal.         Behavior: Behavior normal. Behavior is cooperative.        [address] [facility]

## 2023-11-20 ENCOUNTER — TELEPHONE (OUTPATIENT)
Dept: FAMILY MEDICINE CLINIC | Facility: CLINIC | Age: 48
End: 2023-11-20

## 2023-11-20 NOTE — TELEPHONE ENCOUNTER
[facility] send us a fax that they are requesting a 90 day script for the Sertraline instead of a 30 day script. Could you please change the script to a 90 day supply and resend?

## 2023-11-22 DIAGNOSIS — F41.9 ANXIETY: ICD-10-CM

## 2023-11-27 NOTE — PROGRESS NOTES
[facility]  [address] [facility] [address]  [phone]     Date of Visit: 11/13/2023  Name: Hoa Allen   YOB: 1975        Subjective    VISIT DIAGNOSIS:  Diagnoses and all orders for this visit:    Malignant melanoma of upper back ([address])  -     CBC and differential; Future  -     Comprehensive metabolic panel; Future  -     LD,Blood; Future    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 (HCC)  -     CBC and differential; Future  -     Comprehensive metabolic panel; Future  -     LD,Blood; Future    Encounter for follow-up surveillance of melanoma        Oncology History   Malignant melanoma of upper back ([address])   12/2020 Biopsy    Left upper back shave biopsy:  - Malignant melanoma 2.3mm    Mitosis: 6  Ulceration: not identified     2/2/2021 Surgery    Wide excision with sentinel lymph node biopsy  Lymph Node, [place], number 1, biopsy:  - Metastatic melanoma in one lymph node (1/1, larger focus approximately 1.3 mm; subcapsular and intraparenchyma), no extranodal extension      Skin, left back, excision:  - Residual melanoma, 1.3 mm; prior biopsy site reaction.  - Inked margins with no tumor seen. Lymph Node, [place], sentinel node #2, biopsy:  - Metastatic melanoma in one of ten lymph nodes (1/10, single and small nests of cells, subcapsular, larger focus 0.11 mm); no extranodal extension. 2/2/2021 -  Cancer Staged    Staging form: Melanoma of the Skin, AJCC 8th Edition  - Pathologic stage from 2/2/2021: Stage IIIB (pT3a, pN2a, cM0) - Signed by Chari Pacheco MD on 2/4/2022  Stage prefix: Initial diagnosis       4/8/2021 Genomic Testing    Decision DX: Class 1B     4/2021 - 4/2022 Chemotherapy     Tafinalar 150 mg b.i.d. Mekinist 2 mg p.o.  Daily        Cancer Staging   Malignant melanoma of upper back [facility])  Staging form: Melanoma of the Skin, AJCC 8th Edition  - Pathologic stage from 2/2/2021: Stage IIIB (pT3a, pN2a, cM0) - Signed by Spenser Ellison MD on 2/4/2022          HISTORY OF PRESENT ILLNESS: Júnior Montano is a 46 y.o. male  who has stage IIIb melanoma status post 1 year of adjuvant treatment with dabrafenib and trametinib completing treatment in April 2022 here for continued monitoring, follow-up, and surveillance. He is doing well feels good. No issues concerns or complaints at this time. Was not able to get imaging completed prior to his visit today. Denies any new, changing, concerning skin lesions. Denies any lymphadenopathy. He did have imaging of his left axilla in April that was negative for suspicious lymph nodes at that time. REVIEW OF SYSTEMS:  Review of Systems   Constitutional:  Negative for appetite change, fatigue, fever and unexpected weight change. HENT:   Negative for lump/mass, trouble swallowing and voice change. Eyes:  Negative for icterus. Respiratory:  Negative for cough, shortness of breath and wheezing. Cardiovascular:  Negative for leg swelling. Gastrointestinal:  Negative for abdominal pain, constipation, diarrhea, nausea and vomiting. Genitourinary:  Negative for difficulty urinating and hematuria. Musculoskeletal:  Negative for arthralgias, gait problem and myalgias. Skin:  Negative for itching and rash. No new, changing, or concerning lesions. Neurological:  Negative for extremity weakness, gait problem, headaches, light-headedness and numbness. Hematological:  Negative for adenopathy.         MEDICATIONS:    Current Outpatient Medications:     amLODIPine (NORVASC) 5 mg tablet, Take 1 tablet (5 mg total) by mouth daily, Disp: 90 tablet, Rfl: 0    lisinopril (ZESTRIL) 20 mg tablet, Take 1 tablet (20 mg total) by mouth daily, Disp: 90 tablet, Rfl: 0    sertraline (ZOLOFT) 50 mg tablet, Take 1 tablet (50 mg total) by mouth daily, Disp: 30 tablet, Rfl: 2     ALLERGIES:  No Known Allergies     ACTIVE PROBLEMS:  Patient Active Problem List   Diagnosis Malignant melanoma of upper back ([address])    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 ([address])    Primary hypertension    Anxiety    Mixed hyperlipidemia    Hypertriglyceridemia    Encounter for screening colonoscopy    Encounter for follow-up surveillance of melanoma          PAST MEDICAL HISTORY:   History reviewed. No pertinent past medical history.      PAST SURGICAL HISTORY:  Past Surgical History:   Procedure Laterality Date    LYMPH NODE BIOPSY N/A 2/2/2021    Procedure: BIOPSY LYMPH NODE LEFT AXILLARY SENTINEL;  Surgeon: Abdoulaye Taylor MD;  Location: [facility];  Service: Surgical Oncology    NO PAST SURGERIES      SKIN LESION EXCISION Left 2/2/2021    Procedure: WIDE EXCISION MELANOMA SCAR UPPER BACK, intraoperative lymphatic mapping, sentinel lymph node biopsy; [address];  Surgeon: Abdoulaye Taylor MD;  Location: [facility];  Service: Surgical Oncology        SOCIAL HISTORY:  Social History     Socioeconomic History    Marital status: /Civil Union     Spouse name: None    Number of children: None    Years of education: None    Highest education level: None   Occupational History    None   Tobacco Use    Smoking status: Never    Smokeless tobacco: Never   Vaping Use    Vaping Use: Never used   Substance and Sexual Activity    Alcohol use: Yes    Drug use: Never    Sexual activity: None   Other Topics Concern    None   Social History Narrative    None     Social Determinants of Health     Financial Resource Strain: Not on file   Food Insecurity: Not on file   Transportation Needs: Not on file   Physical Activity: Not on file   Stress: Not on file   Social Connections: Not on file   Intimate Partner Violence: Not on file   Housing Stability: Not on file        FAMILY HISTORY:  Family History   Problem Relation Age of Onset    Basal cell carcinoma Father     No Known Problems Mother     Breast cancer Paternal Aunt 43    Breast cancer Paternal Grandmother 80           Objective    PHYSICAL EXAMINATION:   Blood pressure 126/86, pulse 86, temperature 98.1 °F (36.7 °C), temperature source Temporal, resp. rate 16, height 5' 8" (1.727 m), weight 93.7 kg (206 lb 8 oz), SpO2 99 %. Pain Score: 0-No pain      ECOG Performance Status      Flowsheet Row Most Recent Value   ECOG Performance Status 0 - Fully active, able to carry on all pre-disease performance without restriction              Physical Exam  Constitutional:       General: He is not in acute distress. Appearance: Normal appearance. He is not ill-appearing or toxic-appearing. HENT:      Head: Normocephalic and atraumatic. Right Ear: External ear normal.      Left Ear: External ear normal.      Nose: Nose normal.      Mouth/Throat:      Mouth: Mucous membranes are moist.      Pharynx: Oropharynx is clear. Eyes:      General: No scleral icterus. Right eye: No discharge. Left eye: No discharge. Conjunctiva/sclera: Conjunctivae normal.   Cardiovascular:      Rate and Rhythm: Normal rate and regular rhythm. Pulses: Normal pulses. Heart sounds: No murmur heard. No friction rub. No gallop. Pulmonary:      Effort: Pulmonary effort is normal. No respiratory distress. Breath sounds: Normal breath sounds. No wheezing or rales. Abdominal:      General: Bowel sounds are normal. There is no distension. Palpations: There is no mass. Tenderness: There is no abdominal tenderness. There is no rebound. Musculoskeletal:         General: No swelling or tenderness. Cervical back: Normal range of motion. No rigidity. Right lower leg: No edema. Left lower leg: No edema. Lymphadenopathy:      Head:      Right side of head: No submandibular, preauricular or posterior auricular adenopathy. Left side of head: No submandibular, preauricular or posterior auricular adenopathy. Cervical: No cervical adenopathy. Right cervical: No superficial or posterior cervical adenopathy.      Left cervical: No superficial or posterior cervical adenopathy. Upper Body:      Right upper body: No supraclavicular or axillary adenopathy. Left upper body: No supraclavicular or axillary adenopathy. Lower Body: No right inguinal adenopathy. No left inguinal adenopathy. Skin:     General: Skin is warm. Coloration: Skin is not jaundiced. Findings: No lesion or rash. Comments: Well healed surgical scar. No evidence of recurrence at primary site. Neurological:      General: No focal deficit present. Mental Status: He is alert and oriented to person, place, and time. Cranial Nerves: No cranial nerve deficit. Motor: No weakness. Gait: Gait normal.   Psychiatric:         Mood and Affect: Mood normal.         Behavior: Behavior normal.         Thought Content: Thought content normal.         Judgment: Judgment normal.         I reviewed lab data in the chart.     WBC   Date Value Ref Range Status   11/08/2023 4.35 4.31 - 10.16 Thousand/uL Final   04/07/2023 4.04 (L) 4.31 - 10.16 Thousand/uL Final   01/04/2023 5.30 4.31 - 10.16 Thousand/uL Final     Hemoglobin   Date Value Ref Range Status   11/08/2023 15.1 12.0 - 17.0 g/dL Final   04/07/2023 15.0 12.0 - 17.0 g/dL Final   01/04/2023 14.6 12.0 - 17.0 g/dL Final     Platelets   Date Value Ref Range Status   11/08/2023 181 149 - 390 Thousands/uL Final   04/07/2023 164 149 - 390 Thousands/uL Final   01/04/2023 161 149 - 390 Thousands/uL Final     MCV   Date Value Ref Range Status   11/08/2023 95 82 - 98 fL Final   04/07/2023 97 82 - 98 fL Final   01/04/2023 97 82 - 98 fL Final      Potassium   Date Value Ref Range Status   11/08/2023 4.1 3.5 - 5.3 mmol/L Final   04/07/2023 4.1 3.5 - 5.3 mmol/L Final   01/04/2023 3.9 3.5 - 5.3 mmol/L Final     Chloride   Date Value Ref Range Status   11/08/2023 104 96 - 108 mmol/L Final   04/07/2023 101 96 - 108 mmol/L Final   01/04/2023 100 96 - 108 mmol/L Final     CO2   Date Value Ref Range Status 11/08/2023 22 21 - 32 mmol/L Final   04/07/2023 25 21 - 32 mmol/L Final   01/04/2023 26 21 - 32 mmol/L Final     BUN   Date Value Ref Range Status   11/08/2023 23 5 - 25 mg/dL Final   04/07/2023 20 5 - 25 mg/dL Final   01/04/2023 20 5 - 25 mg/dL Final     Creatinine   Date Value Ref Range Status   11/08/2023 1.41 (H) 0.60 - 1.30 mg/dL Final     Comment:     Standardized to IDMS reference method   04/07/2023 1.30 0.60 - 1.30 mg/dL Final     Comment:     Standardized to IDMS reference method   01/04/2023 1.29 0.60 - 1.30 mg/dL Final     Comment:     Standardized to IDMS reference method     Calcium   Date Value Ref Range Status   11/08/2023 9.4 8.4 - 10.2 mg/dL Final   04/07/2023 9.7 8.4 - 10.2 mg/dL Final   01/04/2023 9.2 8.4 - 10.2 mg/dL Final     Albumin   Date Value Ref Range Status   11/08/2023 4.5 3.5 - 5.0 g/dL Final   04/07/2023 4.6 3.5 - 5.0 g/dL Final   01/04/2023 4.3 3.5 - 5.0 g/dL Final     Total Bilirubin   Date Value Ref Range Status   11/08/2023 0.68 0.20 - 1.00 mg/dL Final     Comment:     Use of this assay is not recommended for patients undergoing treatment with eltrombopag due to the potential for falsely elevated results. N-acetyl-p-benzoquinone imine (metabolite of Acetaminophen) will generate erroneously low results in samples for patients that have taken an overdose of Acetaminophen. 04/07/2023 0.77 0.20 - 1.00 mg/dL Final     Comment:     Use of this assay is not recommended for patients undergoing treatment with eltrombopag due to the potential for falsely elevated results. N-acetyl-p-benzoquinone imine (metabolite of Acetaminophen) will generate erroneously low results in samples for patients that have taken an overdose of Acetaminophen.    01/04/2023 0.64 0.20 - 1.00 mg/dL Final     Alkaline Phosphatase   Date Value Ref Range Status   11/08/2023 53 34 - 104 U/L Final   04/07/2023 38 34 - 104 U/L Final   01/04/2023 52 34 - 104 U/L Final     AST   Date Value Ref Range Status   11/08/2023 26 13 - 39 U/L Final   04/07/2023 32 13 - 39 U/L Final   01/04/2023 30 13 - 39 U/L Final     Comment:     Specimen collection should occur prior to Sulfasalazine administration due to the potential for falsely depressed results. ALT   Date Value Ref Range Status   11/08/2023 34 7 - 52 U/L Final     Comment:     Specimen collection should occur prior to Sulfasalazine administration due to the potential for falsely depressed results. 04/07/2023 49 7 - 52 U/L Final     Comment:     Specimen collection should occur prior to Sulfasalazine administration due to the potential for falsely depressed results. 01/04/2023 39 7 - 52 U/L Final     Comment:     Specimen collection should occur prior to Sulfasalazine administration due to the potential for falsely depressed results. LD   Date Value Ref Range Status   11/08/2023 154 140 - 271 U/L Final   04/07/2023 162 140 - 271 U/L Final   01/04/2023 180 140 - 271 U/L Final     LDH   Date Value Ref Range Status   11/08/2023 167 121 - 224 IU/L Final   04/07/2023 161 121 - 224 IU/L Final     No results found for: "TSH"  No results found for: "E9BNXYV"   No results found for: "FREET4"      RECENT IMAGING:  No results found. Assessment/Plan  Mr. Noah Asher is a 49-year-old gentleman with stage IIIb melanoma status post 1 year of adjuvant treatment with dabrafenib and trametinib here for continued monitoring, follow-up, and surveillance. 1. Malignant melanoma of upper back ([address])  2. 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 ([address])  3. Encounter for follow-up surveillance of melanoma  He is doing well we discussed there is no clinical evidence of melanoma recurrence or metastasis. Labs reviewed and okay. He will work to get his current imaging scheduled as soon as possible that fits into his work schedule. He knows to continue to monitor for any changes or new nodules/masses or lymphadenopathy.     We did discuss that he is approximately 2 and half years out from diagnosis and that we continue to monitor him closely for disease recurrence. He already has orders for imaging and he will use these orders to schedule the appointments. Orders placed and prescription divided for blood work to be done at follow-up in 3 months. He knows to call with issues or concerns prior to his next visit. - CBC and differential; Future  - Comprehensive metabolic panel; Future  - LD,Blood; Future        Return in about 3 months (around 2/13/2024) for Office Visit, labs.  My Note        Lissy Bhardwaj MD, PhD

## 2023-12-07 DIAGNOSIS — I10 HYPERTENSION, UNSPECIFIED TYPE: ICD-10-CM

## 2023-12-07 DIAGNOSIS — I10 PRIMARY HYPERTENSION: ICD-10-CM

## 2023-12-07 RX ORDER — AMLODIPINE BESYLATE 5 MG/1
5 TABLET ORAL DAILY
Qty: 90 TABLET | Refills: 0 | Status: SHIPPED | OUTPATIENT
Start: 2023-12-07 | End: 2024-03-06

## 2023-12-07 RX ORDER — LISINOPRIL 20 MG/1
20 TABLET ORAL DAILY
Qty: 90 TABLET | Refills: 2 | Status: SHIPPED | OUTPATIENT
Start: 2023-12-07

## 2024-01-17 ENCOUNTER — APPOINTMENT (OUTPATIENT)
Dept: LAB | Facility: HOSPITAL | Age: 49
End: 2024-01-17
Payer: COMMERCIAL

## 2024-01-17 DIAGNOSIS — R79.89 ELEVATED SERUM CREATININE: ICD-10-CM

## 2024-01-17 DIAGNOSIS — C43.59 MALIGNANT MELANOMA OF UPPER BACK (HCC): ICD-10-CM

## 2024-01-17 DIAGNOSIS — E78.2 MIXED HYPERLIPIDEMIA: ICD-10-CM

## 2024-01-17 DIAGNOSIS — C77.9 MELANOMA METASTATIC TO LYMPH NODE (HCC): ICD-10-CM

## 2024-01-17 DIAGNOSIS — R73.01 ELEVATED FASTING GLUCOSE: ICD-10-CM

## 2024-01-17 LAB
ALBUMIN SERPL BCP-MCNC: 4.5 G/DL (ref 3.5–5)
ALP SERPL-CCNC: 53 U/L (ref 34–104)
ALT SERPL W P-5'-P-CCNC: 40 U/L (ref 7–52)
ANION GAP SERPL CALCULATED.3IONS-SCNC: 9 MMOL/L
AST SERPL W P-5'-P-CCNC: 28 U/L (ref 13–39)
BASOPHILS # BLD AUTO: 0.02 THOUSANDS/ÂΜL (ref 0–0.1)
BASOPHILS NFR BLD AUTO: 1 % (ref 0–1)
BILIRUB SERPL-MCNC: 0.66 MG/DL (ref 0.2–1)
BUN SERPL-MCNC: 18 MG/DL (ref 5–25)
CALCIUM SERPL-MCNC: 9.3 MG/DL (ref 8.4–10.2)
CHLORIDE SERPL-SCNC: 101 MMOL/L (ref 96–108)
CHOLEST SERPL-MCNC: 216 MG/DL
CO2 SERPL-SCNC: 26 MMOL/L (ref 21–32)
CREAT SERPL-MCNC: 1.29 MG/DL (ref 0.6–1.3)
EOSINOPHIL # BLD AUTO: 0.05 THOUSAND/ÂΜL (ref 0–0.61)
EOSINOPHIL NFR BLD AUTO: 1 % (ref 0–6)
ERYTHROCYTE [DISTWIDTH] IN BLOOD BY AUTOMATED COUNT: 13.5 % (ref 11.6–15.1)
EST. AVERAGE GLUCOSE BLD GHB EST-MCNC: 117 MG/DL
GFR SERPL CREATININE-BSD FRML MDRD: 64 ML/MIN/1.73SQ M
GLUCOSE P FAST SERPL-MCNC: 107 MG/DL (ref 65–99)
HBA1C MFR BLD: 5.7 %
HCT VFR BLD AUTO: 46.5 % (ref 36.5–49.3)
HDLC SERPL-MCNC: 39 MG/DL
HGB BLD-MCNC: 15 G/DL (ref 12–17)
IMM GRANULOCYTES # BLD AUTO: 0.01 THOUSAND/UL (ref 0–0.2)
IMM GRANULOCYTES NFR BLD AUTO: 0 % (ref 0–2)
LDH SERPL-CCNC: 150 U/L (ref 140–271)
LDLC SERPL CALC-MCNC: 144 MG/DL (ref 0–100)
LYMPHOCYTES # BLD AUTO: 1.35 THOUSANDS/ÂΜL (ref 0.6–4.47)
LYMPHOCYTES NFR BLD AUTO: 33 % (ref 14–44)
MCH RBC QN AUTO: 31 PG (ref 26.8–34.3)
MCHC RBC AUTO-ENTMCNC: 32.3 G/DL (ref 31.4–37.4)
MCV RBC AUTO: 96 FL (ref 82–98)
MONOCYTES # BLD AUTO: 0.28 THOUSAND/ÂΜL (ref 0.17–1.22)
MONOCYTES NFR BLD AUTO: 7 % (ref 4–12)
NEUTROPHILS # BLD AUTO: 2.44 THOUSANDS/ÂΜL (ref 1.85–7.62)
NEUTS SEG NFR BLD AUTO: 58 % (ref 43–75)
NONHDLC SERPL-MCNC: 177 MG/DL
NRBC BLD AUTO-RTO: 0 /100 WBCS
PLATELET # BLD AUTO: 153 THOUSANDS/UL (ref 149–390)
PMV BLD AUTO: 10.9 FL (ref 8.9–12.7)
POTASSIUM SERPL-SCNC: 3.8 MMOL/L (ref 3.5–5.3)
PROT SERPL-MCNC: 7.4 G/DL (ref 6.4–8.4)
RBC # BLD AUTO: 4.84 MILLION/UL (ref 3.88–5.62)
SODIUM SERPL-SCNC: 136 MMOL/L (ref 135–147)
TRIGL SERPL-MCNC: 164 MG/DL
WBC # BLD AUTO: 4.15 THOUSAND/UL (ref 4.31–10.16)

## 2024-01-17 PROCEDURE — 83615 LACTATE (LD) (LDH) ENZYME: CPT

## 2024-01-17 PROCEDURE — 83036 HEMOGLOBIN GLYCOSYLATED A1C: CPT

## 2024-01-17 PROCEDURE — 80061 LIPID PANEL: CPT

## 2024-01-17 PROCEDURE — 36415 COLL VENOUS BLD VENIPUNCTURE: CPT

## 2024-01-17 PROCEDURE — 80053 COMPREHEN METABOLIC PANEL: CPT

## 2024-01-17 PROCEDURE — 85025 COMPLETE CBC W/AUTO DIFF WBC: CPT

## 2024-01-22 ENCOUNTER — HOSPITAL ENCOUNTER (OUTPATIENT)
Dept: ULTRASOUND IMAGING | Facility: HOSPITAL | Age: 49
Discharge: HOME | End: 2024-01-22
Payer: COMMERCIAL

## 2024-01-22 ENCOUNTER — HOSPITAL ENCOUNTER (OUTPATIENT)
Dept: CT IMAGING | Facility: HOSPITAL | Age: 49
Discharge: HOME | End: 2024-01-22
Payer: COMMERCIAL

## 2024-01-22 DIAGNOSIS — Z85.820 HISTORY OF MELANOMA: ICD-10-CM

## 2024-01-22 DIAGNOSIS — Z85.820 ENCOUNTER FOR FOLLOW-UP SURVEILLANCE OF MELANOMA: ICD-10-CM

## 2024-01-22 DIAGNOSIS — Z08 ENCOUNTER FOR FOLLOW-UP SURVEILLANCE OF MELANOMA: ICD-10-CM

## 2024-01-22 PROCEDURE — 71260 CT THORAX DX C+: CPT

## 2024-01-22 PROCEDURE — G1004 CDSM NDSC: HCPCS

## 2024-01-22 PROCEDURE — 74177 CT ABD & PELVIS W/CONTRAST: CPT

## 2024-01-22 PROCEDURE — 76882 US LMTD JT/FCL EVL NVASC XTR: CPT

## 2024-01-22 RX ADMIN — IOHEXOL 100 ML: 350 INJECTION, SOLUTION INTRAVENOUS at 10:41

## 2024-01-31 ENCOUNTER — OFFICE VISIT (OUTPATIENT)
Dept: HEMATOLOGY ONCOLOGY | Facility: CLINIC | Age: 49
End: 2024-01-31
Payer: COMMERCIAL

## 2024-01-31 ENCOUNTER — OFFICE VISIT (OUTPATIENT)
Dept: SURGICAL ONCOLOGY | Facility: CLINIC | Age: 49
End: 2024-01-31
Payer: COMMERCIAL

## 2024-01-31 VITALS
OXYGEN SATURATION: 97 % | SYSTOLIC BLOOD PRESSURE: 126 MMHG | BODY MASS INDEX: 31.83 KG/M2 | TEMPERATURE: 97 F | DIASTOLIC BLOOD PRESSURE: 74 MMHG | RESPIRATION RATE: 16 BRPM | HEIGHT: 68 IN | WEIGHT: 210 LBS

## 2024-01-31 VITALS
RESPIRATION RATE: 16 BRPM | TEMPERATURE: 97 F | SYSTOLIC BLOOD PRESSURE: 126 MMHG | HEART RATE: 78 BPM | DIASTOLIC BLOOD PRESSURE: 74 MMHG | HEIGHT: 68 IN | BODY MASS INDEX: 31.83 KG/M2 | OXYGEN SATURATION: 97 % | WEIGHT: 210 LBS

## 2024-01-31 DIAGNOSIS — Z85.820 ENCOUNTER FOR FOLLOW-UP SURVEILLANCE OF MELANOMA: Primary | ICD-10-CM

## 2024-01-31 DIAGNOSIS — C77.9 MELANOMA METASTATIC TO LYMPH NODE (HCC): ICD-10-CM

## 2024-01-31 DIAGNOSIS — C43.59 MALIGNANT MELANOMA OF UPPER BACK (HCC): ICD-10-CM

## 2024-01-31 DIAGNOSIS — Z85.820 ENCOUNTER FOR FOLLOW-UP SURVEILLANCE OF MELANOMA: ICD-10-CM

## 2024-01-31 DIAGNOSIS — Z08 ENCOUNTER FOR FOLLOW-UP SURVEILLANCE OF MELANOMA: ICD-10-CM

## 2024-01-31 DIAGNOSIS — Z08 ENCOUNTER FOR FOLLOW-UP SURVEILLANCE OF MELANOMA: Primary | ICD-10-CM

## 2024-01-31 DIAGNOSIS — C43.59 MALIGNANT MELANOMA OF UPPER BACK (HCC): Primary | ICD-10-CM

## 2024-01-31 PROCEDURE — 99214 OFFICE O/P EST MOD 30 MIN: CPT | Performed by: INTERNAL MEDICINE

## 2024-01-31 PROCEDURE — 99213 OFFICE O/P EST LOW 20 MIN: CPT

## 2024-01-31 NOTE — LETTER
January 31, 2024     Alber Siu MD  [address] [facility]  [address]nd [address]  [place] [address]    Patient: Moris Mercado   YOB: 1975   Date of Visit: 1/31/2024       Dear Dr. Siu:    Thank you for referring Moris Mercado to me for evaluation. Below are my notes for this consultation.    If you have questions, please do not hesitate to call me. I look forward to following your patient along with you.         Sincerely,        Maribel Bronson MD        CC: DO Jenn Santana CRNP Melissa A Wilson, MD  1/31/2024  5:48 PM  Sign when Signing Visit  [facility] [place]  [address] [address]  [phone]  [phone]     Date of Visit: 1/31/2024  Name: Moris Mercado   YOB: 1975        Subjective    VISIT DIAGNOSIS:  Diagnoses and all orders for this visit:    Malignant melanoma of upper back (HCC)  -     CBC and differential; Future  -     Comprehensive metabolic panel; Future  -     LD,Blood; Future    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 (HCC)  -     CBC and differential; Future  -     Comprehensive metabolic panel; Future  -     LD,Blood; Future    Encounter for follow-up surveillance of melanoma  -     CBC and differential; Future  -     Comprehensive metabolic panel; Future  -     LD,Blood; Future        Oncology History   Malignant melanoma of upper back (HCC)   12/2020 Biopsy    Left upper back shave biopsy:  - Malignant melanoma 2.3mm    Mitosis: 6  Ulceration: not identified     2/2/2021 Surgery    Wide excision with sentinel lymph node biopsy  Lymph Node, Everetts, number 1, biopsy:  - Metastatic melanoma in one lymph node (1/1, larger focus approximately 1.3 mm; subcapsular and intraparenchyma), no extranodal extension      Skin, left back, excision:  - Residual melanoma, 1.3 mm; prior biopsy site reaction.  - Inked margins with no tumor seen.        Lymph Node, Everetts, sentinel node #2, biopsy:  - Metastatic melanoma  in one of ten lymph nodes (1/10, single and small nests of cells, subcapsular, larger focus 0.11 mm); no extranodal extension.       2/2/2021 -  Cancer Staged    Staging form: Melanoma of the Skin, AJCC 8th Edition  - Pathologic stage from 2/2/2021: Stage IIIB (pT3a, pN2a, cM0) - Signed by Maribel Bronson MD on 2/4/2022  Stage prefix: Initial diagnosis       4/8/2021 Genomic Testing    Decision DX: Class 1B     4/2021 - 4/2022 Chemotherapy     Tafinalar 150 mg b.i.d. Mekinist 2 mg p.o. Daily        Cancer Staging   Malignant melanoma of upper back (HCC)  Staging form: Melanoma of the Skin, AJCC 8th Edition  - Pathologic stage from 2/2/2021: Stage IIIB (pT3a, pN2a, cM0) - Signed by Maribel Bronson MD on 2/4/2022          HISTORY OF PRESENT ILLNESS: Moris Mercado is a 48 y.o. male  who has Stage IIIB melanoma here for follow-up.    He is doing good. He denies having any issues or concerns. He has a follow-up with his primary care in 05/2024. He has a CT scan scheduled in August 2024, as scheduled by Dr. Siu. He denies any skin concerns.  Denies any new, changing, concerning skin lesions.  He denies any new lumps or bumps -no lymphadenopathy.    His CT of chest, abdomen, and pelvis on 01/22/2025 demonstrates no evidence of disease recurrence or metastasis.           Review of Systems   Constitutional:  Negative for appetite change, fatigue, fever and unexpected weight change.   HENT:   Negative for lump/mass, trouble swallowing and voice change.    Eyes:  Negative for icterus.   Respiratory:  Negative for cough, shortness of breath and wheezing.    Cardiovascular:  Negative for leg swelling.   Gastrointestinal:  Negative for abdominal pain, constipation, diarrhea, nausea and vomiting.   Genitourinary:  Negative for difficulty urinating and hematuria.    Musculoskeletal:  Negative for arthralgias, gait problem and myalgias.   Skin:  Negative for itching and rash.        No new, changing, or concerning lesions.    Neurological:  Negative for extremity weakness, gait problem, headaches, light-headedness and numbness.   Hematological:  Negative for adenopathy.        MEDICATIONS:    Current Outpatient Medications:   •  amLODIPine (NORVASC) 5 mg tablet, Take 1 tablet (5 mg total) by mouth daily, Disp: 90 tablet, Rfl: 0  •  lisinopril (ZESTRIL) 20 mg tablet, Take 1 tablet (20 mg total) by mouth daily, Disp: 90 tablet, Rfl: 2  •  sertraline (ZOLOFT) 50 mg tablet, Take 1 tablet (50 mg total) by mouth daily, Disp: 30 tablet, Rfl: 2     ALLERGIES:  No Known Allergies     ACTIVE PROBLEMS:  Patient Active Problem List   Diagnosis   • Malignant melanoma of upper back (HCC)   • 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 (HCC)   • Primary hypertension   • Anxiety   • Mixed hyperlipidemia   • Hypertriglyceridemia   • Encounter for screening colonoscopy   • Encounter for follow-up surveillance of melanoma          PAST MEDICAL HISTORY:   History reviewed. No pertinent past medical history.     PAST SURGICAL HISTORY:  Past Surgical History:   Procedure Laterality Date   • LYMPH NODE BIOPSY N/A 2/2/2021    Procedure: BIOPSY LYMPH NODE LEFT AXILLARY SENTINEL;  Surgeon: Alber Siu MD;  Location: [facility];  Service: Surgical Oncology   • NO PAST SURGERIES     • SKIN LESION EXCISION Left 2/2/2021    Procedure: WIDE EXCISION MELANOMA SCAR UPPER BACK, intraoperative lymphatic mapping, sentinel lymph node biopsy; [address];  Surgeon: Alber Siu MD;  Location: [facility];  Service: Surgical Oncology        SOCIAL HISTORY:  Social History     Socioeconomic History   • Marital status: /Civil Union     Spouse name: None   • Number of children: None   • Years of education: None   • Highest education level: None   Occupational History   • None   Tobacco Use   • Smoking status: Never   • Smokeless tobacco: Never   Vaping Use   • Vaping status: Never Used   Substance and Sexual Activity   • Alcohol use: Yes   • Drug use: Never   • Sexual  "activity: None   Other Topics Concern   • None   Social History Narrative   • None     Social Determinants of Health     Financial Resource Strain: Not on file   Food Insecurity: Not on file   Transportation Needs: Not on file   Physical Activity: Not on file   Stress: Not on file   Social Connections: Not on file   Intimate Partner Violence: Not on file   Housing Stability: Not on file        FAMILY HISTORY:  Family History   Problem Relation Age of Onset   • Basal cell carcinoma Father    • No Known Problems Mother    • Breast cancer Paternal Aunt 42   • Breast cancer Paternal Grandmother 80           Objective    PHYSICAL EXAMINATION:   Blood pressure 126/74, pulse 78, temperature (!) 97 °F (36.1 °C), temperature source Temporal, resp. rate 16, height 5' 8\", weight 95.3 kg (210 lb), SpO2 97%.     Pain Score: 0-No pain     ECOG Performance Status      Flowsheet Row Most Recent Value   ECOG Performance Status 0 - Fully active, able to carry on all pre-disease performance without restriction               Physical Exam  Constitutional:       General: He is not in acute distress.     Appearance: Normal appearance. He is not ill-appearing or toxic-appearing.   HENT:      Head: Normocephalic and atraumatic.      Right Ear: External ear normal.      Left Ear: External ear normal.      Nose: Nose normal.      Mouth/Throat:      Mouth: Mucous membranes are moist.      Pharynx: Oropharynx is clear.   Eyes:      General: No scleral icterus.        Right eye: No discharge.         Left eye: No discharge.      Conjunctiva/sclera: Conjunctivae normal.   Cardiovascular:      Rate and Rhythm: Normal rate and regular rhythm.      Pulses: Normal pulses.      Heart sounds: No murmur heard.     No friction rub. No gallop.   Pulmonary:      Effort: Pulmonary effort is normal. No respiratory distress.      Breath sounds: Normal breath sounds. No wheezing or rales.   Abdominal:      General: Bowel sounds are normal. There is no " distension.      Palpations: There is no mass.      Tenderness: There is no abdominal tenderness. There is no rebound.   Musculoskeletal:         General: No swelling or tenderness.      Cervical back: Normal range of motion. No rigidity.      Right lower leg: No edema.      Left lower leg: No edema.   Lymphadenopathy:      Head:      Right side of head: No submandibular, preauricular or posterior auricular adenopathy.      Left side of head: No submandibular, preauricular or posterior auricular adenopathy.      Cervical: No cervical adenopathy.      Right cervical: No superficial or posterior cervical adenopathy.     Left cervical: No superficial or posterior cervical adenopathy.      Upper Body:      Right upper body: No supraclavicular or axillary adenopathy.      Left upper body: No supraclavicular or axillary adenopathy.      Lower Body: No right inguinal adenopathy. No left inguinal adenopathy.   Skin:     General: Skin is warm.      Coloration: Skin is not jaundiced.      Findings: No lesion or rash.      Comments: Well healed surgical scar.  No evidence of recurrence at primary site.   Neurological:      General: No focal deficit present.      Mental Status: He is alert and oriented to person, place, and time.      Cranial Nerves: No cranial nerve deficit.      Motor: No weakness.      Gait: Gait normal.   Psychiatric:         Mood and Affect: Mood normal.         Behavior: Behavior normal.         Thought Content: Thought content normal.         Judgment: Judgment normal.       I reviewed lab data in the chart.    WBC   Date Value Ref Range Status   01/17/2024 4.15 (L) 4.31 - 10.16 Thousand/uL Final   11/08/2023 4.35 4.31 - 10.16 Thousand/uL Final   04/07/2023 4.04 (L) 4.31 - 10.16 Thousand/uL Final     Hemoglobin   Date Value Ref Range Status   01/17/2024 15.0 12.0 - 17.0 g/dL Final   11/08/2023 15.1 12.0 - 17.0 g/dL Final   04/07/2023 15.0 12.0 - 17.0 g/dL Final     Platelets   Date Value Ref Range Status    01/17/2024 153 149 - 390 Thousands/uL Final   11/08/2023 181 149 - 390 Thousands/uL Final   04/07/2023 164 149 - 390 Thousands/uL Final     MCV   Date Value Ref Range Status   01/17/2024 96 82 - 98 fL Final   11/08/2023 95 82 - 98 fL Final   04/07/2023 97 82 - 98 fL Final      Potassium   Date Value Ref Range Status   01/17/2024 3.8 3.5 - 5.3 mmol/L Final   11/08/2023 4.1 3.5 - 5.3 mmol/L Final   04/07/2023 4.1 3.5 - 5.3 mmol/L Final     Chloride   Date Value Ref Range Status   01/17/2024 101 96 - 108 mmol/L Final   11/08/2023 104 96 - 108 mmol/L Final   04/07/2023 101 96 - 108 mmol/L Final     CO2   Date Value Ref Range Status   01/17/2024 26 21 - 32 mmol/L Final   11/08/2023 22 21 - 32 mmol/L Final   04/07/2023 25 21 - 32 mmol/L Final     BUN   Date Value Ref Range Status   01/17/2024 18 5 - 25 mg/dL Final   11/08/2023 23 5 - 25 mg/dL Final   04/07/2023 20 5 - 25 mg/dL Final     Creatinine   Date Value Ref Range Status   01/17/2024 1.29 0.60 - 1.30 mg/dL Final     Comment:     Standardized to IDMS reference method   11/08/2023 1.41 (H) 0.60 - 1.30 mg/dL Final     Comment:     Standardized to IDMS reference method   04/07/2023 1.30 0.60 - 1.30 mg/dL Final     Comment:     Standardized to IDMS reference method     Calcium   Date Value Ref Range Status   01/17/2024 9.3 8.4 - 10.2 mg/dL Final   11/08/2023 9.4 8.4 - 10.2 mg/dL Final   04/07/2023 9.7 8.4 - 10.2 mg/dL Final     Albumin   Date Value Ref Range Status   01/17/2024 4.5 3.5 - 5.0 g/dL Final   11/08/2023 4.5 3.5 - 5.0 g/dL Final   04/07/2023 4.6 3.5 - 5.0 g/dL Final     Total Bilirubin   Date Value Ref Range Status   01/17/2024 0.66 0.20 - 1.00 mg/dL Final     Comment:     Use of this assay is not recommended for patients undergoing treatment with eltrombopag due to the potential for falsely elevated results.  N-acetyl-p-benzoquinone imine (metabolite of Acetaminophen) will generate erroneously low results in samples for patients that have taken an overdose  "of Acetaminophen.   11/08/2023 0.68 0.20 - 1.00 mg/dL Final     Comment:     Use of this assay is not recommended for patients undergoing treatment with eltrombopag due to the potential for falsely elevated results.  N-acetyl-p-benzoquinone imine (metabolite of Acetaminophen) will generate erroneously low results in samples for patients that have taken an overdose of Acetaminophen.   04/07/2023 0.77 0.20 - 1.00 mg/dL Final     Comment:     Use of this assay is not recommended for patients undergoing treatment with eltrombopag due to the potential for falsely elevated results.  N-acetyl-p-benzoquinone imine (metabolite of Acetaminophen) will generate erroneously low results in samples for patients that have taken an overdose of Acetaminophen.     Alkaline Phosphatase   Date Value Ref Range Status   01/17/2024 53 34 - 104 U/L Final   11/08/2023 53 34 - 104 U/L Final   04/07/2023 38 34 - 104 U/L Final     AST   Date Value Ref Range Status   01/17/2024 28 13 - 39 U/L Final   11/08/2023 26 13 - 39 U/L Final   04/07/2023 32 13 - 39 U/L Final     ALT   Date Value Ref Range Status   01/17/2024 40 7 - 52 U/L Final     Comment:     Specimen collection should occur prior to Sulfasalazine administration due to the potential for falsely depressed results.    11/08/2023 34 7 - 52 U/L Final     Comment:     Specimen collection should occur prior to Sulfasalazine administration due to the potential for falsely depressed results.    04/07/2023 49 7 - 52 U/L Final     Comment:     Specimen collection should occur prior to Sulfasalazine administration due to the potential for falsely depressed results.       LDH   Date Value Ref Range Status   11/08/2023 167 121 - 224 IU/L Final   04/07/2023 161 121 - 224 IU/L Final     LD   Date Value Ref Range Status   01/17/2024 150 140 - 271 U/L Final   11/08/2023 154 140 - 271 U/L Final   04/07/2023 162 140 - 271 U/L Final     No results found for: \"TSH\"  No results found for: \"S7TIRSA\"   No " "results found for: \"FREET4\"      RECENT IMAGING:  Procedure: US axilla    Result Date: 1/29/2024  Narrative: ULTRASOUND EXTREMITY SOFT TISSUE TECHNIQUE:    Real-time ultrasound of the left axilla was performed with a linear transducer with both volumetric sweeps and still imaging techniques. INDICATION:   Z08: Encounter for follow-up examination after completed treatment for malignant neoplasm Z85.820: Personal history of malignant melanoma of skin. COMPARISON: Ultrasound 4/18/2023 and 4/22/2023. CT chest 1/12/2024 and 4/12/2023. FINDINGS: Targeted ultrasound of the left axilla in region of prior surgical scar was performed. Stable lymph node with normal morphology including fatty hilum and nonthickened cortex medial to the surgical scar measuring one 0.8 x 1.0 x 1.2 cm, previously previously 0.8 x 1.0 x 1.6 cm. Stable lymph node with normal morphology including fatty hilum and nonthickened cortex inferior and medial to the surgical scar measuring 0.8 x 1.2 x 2.0 cm, previously 1.2 x 1.4 x 2.1 cm. No new suspicious findings within the left axilla.     Impression: Continued stability of left axillary lymph nodes with benign-appearing morphology. Attention on follow-up imaging as clinically indicated. Workstation performed: [id number]     Procedure: CT chest abdomen pelvis w contrast    Result Date: 1/23/2024  Narrative: CT CHEST, ABDOMEN AND PELVIS WITH IV CONTRAST INDICATION: Z08: Encounter for follow-up examination after completed treatment for malignant neoplasm Z85.820: Personal history of malignant melanoma of skin. COMPARISON: Multiple examinations dating back to 2/26/2021 TECHNIQUE: CT examination of the chest, abdomen and pelvis was performed. Scanning through the abdomen was performed in arterial, venous and delayed phases according a protocol specifically designed to evaluate the upper abdominal viscera. Multiplanar 2D  reformatted images were created from the source data. This examination, like all CT scans " performed in the [facility], was performed utilizing techniques to minimize radiation dose exposure, including the use of iterative reconstruction and automated exposure control. Radiation dose length product (DLP) for this visit: 2156.98 mGy-cm IV Contrast: 100 mL of iohexol (OMNIPAQUE) Enteric Contrast: Administered. FINDINGS: CHEST LUNGS: Scattered faint groundglass opacities are again noted bilaterally. No pulmonary nodules. No tracheal or endobronchial lesion. PLEURA: Unremarkable. HEART/GREAT VESSELS: Heart is unremarkable for patient's age. No thoracic aortic aneurysm. MEDIASTINUM AND MAGALYS: Unremarkable. CHEST WALL AND LOWER NECK: Prior surgical changes in the left upper back soft tissues. No appreciable nodularity or masslike enhancement. Surgical clips in the left axilla. ABDOMEN LIVER/BILIARY TREE: Hepatic steatosis. No suspicious mass. Low-attenuation lesion in the right hepatic lobe measuring 1.2 cm and is unchanged. No enhancement suggesting a cyst. Normal hepatic contours. No biliary dilation. GALLBLADDER: No calcified gallstones. No pericholecystic inflammatory change. SPLEEN: Unremarkable. PANCREAS: Unremarkable. ADRENAL GLANDS: Unremarkable. KIDNEYS/URETERS: Unremarkable. No hydronephrosis. Left renal cyst measuring 1.6 cm STOMACH AND BOWEL: Colonic diverticulosis without findings of acute diverticulitis. APPENDIX: No findings to suggest appendicitis. ABDOMINOPELVIC CAVITY: No ascites. No pneumoperitoneum. No lymphadenopathy. VESSELS: Unremarkable for patient's age. PELVIS REPRODUCTIVE ORGANS: Prostate calcifications. URINARY BLADDER: Under distention diminishing diagnostic assessment. No calculi. Mild circumferential urinary bladder wall thickening. May be due to under distention. ABDOMINAL WALL/INGUINAL REGIONS: Unremarkable. BONES: No acute fracture or suspicious osseous lesion. Pars defects bilaterally at L3.     Impression: 1. Postsurgical changes at the level of the left upper  back without evidence of nodularity or masslike enhancement to suggest recurrent or residual disease. 2. Scattered groundglass opacities in the lungs with similar appearance to the previous exam 3. No pathologically enlarged mediastinal, abdominal or pelvic lymphadenopathy. 4. Stable low-attenuation lesion in the right hepatic lobe without appreciable enhancement and felt to be compatible with a cyst. 5. Hepatic steatosis. Workstation performed: [id number]            Assessment/Plan  1. Malignant melanoma of upper back (HCC)  -     CBC and differential; Future; Expected date: 04/30/2024  -     Comprehensive metabolic panel; Future; Expected date: 04/30/2024  -     LD,Blood; Future; Expected date: 04/30/2024    2. 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 (HCC)  -     CBC and differential; Future; Expected date: 04/30/2024  -     Comprehensive metabolic panel; Future; Expected date: 04/30/2024  -     LD,Blood; Future; Expected date: 04/30/2024    3. Encounter for follow-up surveillance of melanoma  Assessment & Plan:  The patient is a 48-year-old male with stage 3b melanoma status post 1 year of adjuvant treatment with tafinlar and mekinist here for continued monitoring, follow-up, and surveillance. He is doing well. He has no clinical evidence of melanoma recurrence or metastasis. We discussed imaging that he had on 01/22/2025 with a CT chest, abdomen, and pelvis that demonstrate no evidence of disease recurrence or metastasis. He also had a left axillary ultrasound done on the same day with continued stability of left axillary lymph nodes and benign appearing morphology. We will continue to follow him closely. He is due for follow-up with a full body check, exam, and blood work in 3 months. Orders placed and prescription provided for blood work. Dr. Siu has actually ordered him for follow-up imaging in 6 months. He knows how to monitor for any new, changing, concerning skin lesions. He knows to call with any issues or  concerns prior to his next visit.    Orders:  -     CBC and differential; Future; Expected date: 04/30/2024  -     Comprehensive metabolic panel; Future; Expected date: 04/30/2024  -     LD,Blood; Future; Expected date: 04/30/2024         Return in about 3 months (around 4/30/2024) for Office Visit, labs.     Maribel Bronosn MD, PhD    Transcribed for Maribel Bronson MD, by BISMITA BEHERA on 01/31/24 at 1:57 PM. Powered by Dragon [employer].

## 2024-01-31 NOTE — PROGRESS NOTES
Surgical Oncology Follow Up       [address] [facility] SURGICAL ONCOLOGY TATI  [address] [facility] Center[facility]  [place] [address]    Moris Mercado  1975  [id number]  [address] [facility] SURGICAL ONCOLOGY TATI  [address] [facility]  [facility] [address]    No chief complaint on file.      Assessment/Plan:  1. Encounter for follow-up surveillance of melanoma  - 6 mo follow up    2. Malignant melanoma of upper back (HCC)  - CT chest abdomen pelvis w contrast; Future  - US extremity soft tissue; Future  - Lactate dehydrogenase, isoenzymes; Future    3. 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 (HCC)  - CT chest abdomen pelvis w contrast; Future  - US extremity soft tissue; Future       Discussion/Summary: Patient is a 48-year-old male presenting today for 6-month follow-up for history of melanoma diagnosed in December 2020.  Patient had a wide excision of melanoma on the left upper back with sentinel lymph node biopsy.  He had metastatic melanoma in 1 of 10 lymph nodes.  He completed adjuvant chemotherapy.  He is now on surveillance.  He had an ultrasound of the left axilla on 1/22/2024 which showed continued stability of left axillary lymph nodes with benign appearing morphology.  Additionally, on the same day had a CT chest abdomen pelvis which showed postsurgical changes of the left upper back without evidence of nodularity or masslike enhancement to suggest recurrent of residual disease. LDH was WNL. There were no concerns on his clinical exam. He states he does not see a dermatologist but Dr. Bronson performs skin checks for him. He has an appt with hem/onc this morning. We will see the patient back in 6 months or sooner should the need arise with repeat imaging. She was instructed to call with any questions or concerns prior to this time. All questions were answered today.      History of Present Illness:     Oncology History   Malignant melanoma of upper back (HCC)    12/2020 Biopsy    Left upper back shave biopsy:  - Malignant melanoma 2.3mm    Mitosis: 6  Ulceration: not identified     2/2/2021 Surgery    Wide excision with sentinel lymph node biopsy  Lymph Node, [place], number 1, biopsy:  - Metastatic melanoma in one lymph node (1/1, larger focus approximately 1.3 mm; subcapsular and intraparenchyma), no extranodal extension      Skin, left back, excision:  - Residual melanoma, 1.3 mm; prior biopsy site reaction.  - Inked margins with no tumor seen.        Lymph Node, [place], sentinel node #2, biopsy:  - Metastatic melanoma in one of ten lymph nodes (1/10, single and small nests of cells, subcapsular, larger focus 0.11 mm); no extranodal extension.       2/2/2021 -  Cancer Staged    Staging form: Melanoma of the Skin, AJCC 8th Edition  - Pathologic stage from 2/2/2021: Stage IIIB (pT3a, pN2a, cM0) - Signed by Maribel Bronson MD on 2/4/2022  Stage prefix: Initial diagnosis       4/8/2021 Genomic Testing    Decision DX: Class 1B     4/2021 - 4/2022 Chemotherapy     Tafinalar 150 mg b.i.d. Mekinist 2 mg p.o. Daily          -Interval History: Patient is a 48-year-old male presenting today for 6-month follow-up for history of melanoma diagnosed in December 2020. He is on surveillance. He had an ultrasound of the left axilla on 1/22/2024 which showed continued stability of left axillary lymph nodes with benign appearing morphology. Additionally, on the same day had a CT chest abdomen pelvis which showed postsurgical changes of the left upper back without evidence of nodularity or masslike enhancement to suggest recurrent of residual disease. He denies skin changes. He denies persistent headaches, bone pain, back pain, shortness of breath, or abdominal pain.      Review of Systems:  Review of Systems   Constitutional:  Negative for activity change, appetite change, fatigue and unexpected weight change.   Respiratory:  Negative for cough and shortness of breath.     Cardiovascular:  Negative for chest pain.   Gastrointestinal:  Negative for abdominal pain, diarrhea, nausea and vomiting.   Endocrine: Negative for heat intolerance.   Musculoskeletal:  Negative for arthralgias, back pain and myalgias.   Skin:  Negative for rash.   Neurological:  Negative for weakness and headaches.   Hematological:  Negative for adenopathy.       Patient Active Problem List   Diagnosis    Malignant melanoma of upper back (HCC)    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 (HCC)    Primary hypertension    Anxiety    Mixed hyperlipidemia    Hypertriglyceridemia    Encounter for screening colonoscopy    Encounter for follow-up surveillance of melanoma     No past medical history on file.  Past Surgical History:   Procedure Laterality Date    LYMPH NODE BIOPSY N/A 2/2/2021    Procedure: BIOPSY LYMPH NODE LEFT AXILLARY SENTINEL;  Surgeon: Alber Siu MD;  Location: [facility];  Service: Surgical Oncology    NO PAST SURGERIES      SKIN LESION EXCISION Left 2/2/2021    Procedure: WIDE EXCISION MELANOMA SCAR UPPER BACK, intraoperative lymphatic mapping, sentinel lymph node biopsy; [address];  Surgeon: Alber Siu MD;  Location: [facility];  Service: Surgical Oncology     Family History   Problem Relation Age of Onset    Basal cell carcinoma Father     No Known Problems Mother     Breast cancer Paternal Aunt 42    Breast cancer Paternal Grandmother 80     Social History     Socioeconomic History    Marital status: /Civil Union     Spouse name: Not on file    Number of children: Not on file    Years of education: Not on file    Highest education level: Not on file   Occupational History    Not on file   Tobacco Use    Smoking status: Never    Smokeless tobacco: Never   Vaping Use    Vaping status: Never Used   Substance and Sexual Activity    Alcohol use: Yes    Drug use: Never    Sexual activity: Not on file   Other Topics Concern    Not on file   Social History Narrative    Not on file      Social Determinants of Health     Financial Resource Strain: Not on file   Food Insecurity: Not on file   Transportation Needs: Not on file   Physical Activity: Not on file   Stress: Not on file   Social Connections: Not on file   Intimate Partner Violence: Not on file   Housing Stability: Not on file       Current Outpatient Medications:     amLODIPine (NORVASC) 5 mg tablet, Take 1 tablet (5 mg total) by mouth daily, Disp: 90 tablet, Rfl: 0    lisinopril (ZESTRIL) 20 mg tablet, Take 1 tablet (20 mg total) by mouth daily, Disp: 90 tablet, Rfl: 2    sertraline (ZOLOFT) 50 mg tablet, Take 1 tablet (50 mg total) by mouth daily, Disp: 30 tablet, Rfl: 2  No Known Allergies  Vitals:    01/31/24 0913   BP: 126/74   Resp: 16   Temp: (!) 97 °F (36.1 °C)   SpO2: 97%       Physical Exam  Vitals reviewed.   Constitutional:       General: He is not in acute distress.     Appearance: Normal appearance. He is well-developed.   HENT:      Head: Normocephalic and atraumatic.   Cardiovascular:      Rate and Rhythm: Normal rate and regular rhythm.      Heart sounds: Normal heart sounds.   Pulmonary:      Effort: Pulmonary effort is normal.      Breath sounds: Normal breath sounds.   Abdominal:      General: There is no distension.      Palpations: Abdomen is soft. There is no mass.      Tenderness: There is no abdominal tenderness.   Musculoskeletal:         General: Normal range of motion.      Cervical back: Normal range of motion.   Lymphadenopathy:      Upper Body:      Right upper body: No supraclavicular or axillary adenopathy.      Left upper body: No supraclavicular or axillary adenopathy.   Skin:     General: Skin is warm and dry.      Findings: No abscess, bruising, lesion or rash.          Neurological:      Mental Status: He is alert and oriented to person, place, and time.           Results:    Imaging  US axilla    Result Date: 1/29/2024  Narrative: ULTRASOUND EXTREMITY SOFT TISSUE TECHNIQUE:    Real-time ultrasound  of the left axilla was performed with a linear transducer with both volumetric sweeps and still imaging techniques. INDICATION:   Z08: Encounter for follow-up examination after completed treatment for malignant neoplasm Z85.820: Personal history of malignant melanoma of skin. COMPARISON: Ultrasound 4/18/2023 and 4/22/2023. CT chest 1/12/2024 and 4/12/2023. FINDINGS: Targeted ultrasound of the left axilla in region of prior surgical scar was performed. Stable lymph node with normal morphology including fatty hilum and nonthickened cortex medial to the surgical scar measuring one 0.8 x 1.0 x 1.2 cm, previously previously 0.8 x 1.0 x 1.6 cm. Stable lymph node with normal morphology including fatty hilum and nonthickened cortex inferior and medial to the surgical scar measuring 0.8 x 1.2 x 2.0 cm, previously 1.2 x 1.4 x 2.1 cm. No new suspicious findings within the left axilla.     Impression: Continued stability of left axillary lymph nodes with benign-appearing morphology. Attention on follow-up imaging as clinically indicated. Workstation performed: [id number]     CT chest abdomen pelvis w contrast    Result Date: 1/23/2024  Narrative: CT CHEST, ABDOMEN AND PELVIS WITH IV CONTRAST INDICATION: Z08: Encounter for follow-up examination after completed treatment for malignant neoplasm Z85.820: Personal history of malignant melanoma of skin. COMPARISON: Multiple examinations dating back to 2/26/2021 TECHNIQUE: CT examination of the chest, abdomen and pelvis was performed. Scanning through the abdomen was performed in arterial, venous and delayed phases according a protocol specifically designed to evaluate the upper abdominal viscera. Multiplanar 2D  reformatted images were created from the source data. This examination, like all CT scans performed in the [facility], was performed utilizing techniques to minimize radiation dose exposure, including the use of iterative reconstruction and automated exposure  control. Radiation dose length product (DLP) for this visit: 2156.98 mGy-cm IV Contrast: 100 mL of iohexol (OMNIPAQUE) Enteric Contrast: Administered. FINDINGS: CHEST LUNGS: Scattered faint groundglass opacities are again noted bilaterally. No pulmonary nodules. No tracheal or endobronchial lesion. PLEURA: Unremarkable. HEART/GREAT VESSELS: Heart is unremarkable for patient's age. No thoracic aortic aneurysm. MEDIASTINUM AND MAGALYS: Unremarkable. CHEST WALL AND LOWER NECK: Prior surgical changes in the left upper back soft tissues. No appreciable nodularity or masslike enhancement. Surgical clips in the left axilla. ABDOMEN LIVER/BILIARY TREE: Hepatic steatosis. No suspicious mass. Low-attenuation lesion in the right hepatic lobe measuring 1.2 cm and is unchanged. No enhancement suggesting a cyst. Normal hepatic contours. No biliary dilation. GALLBLADDER: No calcified gallstones. No pericholecystic inflammatory change. SPLEEN: Unremarkable. PANCREAS: Unremarkable. ADRENAL GLANDS: Unremarkable. KIDNEYS/URETERS: Unremarkable. No hydronephrosis. Left renal cyst measuring 1.6 cm STOMACH AND BOWEL: Colonic diverticulosis without findings of acute diverticulitis. APPENDIX: No findings to suggest appendicitis. ABDOMINOPELVIC CAVITY: No ascites. No pneumoperitoneum. No lymphadenopathy. VESSELS: Unremarkable for patient's age. PELVIS REPRODUCTIVE ORGANS: Prostate calcifications. URINARY BLADDER: Under distention diminishing diagnostic assessment. No calculi. Mild circumferential urinary bladder wall thickening. May be due to under distention. ABDOMINAL WALL/INGUINAL REGIONS: Unremarkable. BONES: No acute fracture or suspicious osseous lesion. Pars defects bilaterally at L3.     Impression: 1. Postsurgical changes at the level of the left upper back without evidence of nodularity or masslike enhancement to suggest recurrent or residual disease. 2. Scattered groundglass opacities in the lungs with similar appearance to the  previous exam 3. No pathologically enlarged mediastinal, abdominal or pelvic lymphadenopathy. 4. Stable low-attenuation lesion in the right hepatic lobe without appreciable enhancement and felt to be compatible with a cyst. 5. Hepatic steatosis. Workstation performed: [id number]       I reviewed the above imaging data.      Advance Care Planning/Advance Directives:  Discussed disease status, cancer treatment plans and/or cancer treatment goals with the patient.

## 2024-01-31 NOTE — ASSESSMENT & PLAN NOTE
The patient is a 48-year-old male with stage 3b melanoma status post 1 year of adjuvant treatment with tafinlar mechanism here for continued monitoring, follow-up, and surveillance. He is doing well. He has no clinical evidence of melanoma recurrence or metastasis. We discussed imaging that he had on 01/22/2025 with a CT chest, abdomen, and pelvis that demonstrate no evidence of disease recurrence or metastasis. He also had a left axillary ultrasound done on the same day with continued stability of left axillary lymph nodes and benign appearing morphology. We will continue to follow him closely. He is due for follow-up with a full body check, exam, and blood work in 3 months. Orders placed and prescription provided for blood work. Dr. Eid has actually ordered him for follow-up imaging in 6 months. He knows how to monitor for any new, changing, concerning skin lesions. He knows to call with any issues or concerns prior to his next visit.

## 2024-01-31 NOTE — PROGRESS NOTES
[facility] [place]  [address]  [place] [address]  [phone]     Date of Visit: 1/31/2024  Name: Moris Mercado   YOB: 1975        Subjective    VISIT DIAGNOSIS:  Diagnoses and all orders for this visit:    Malignant melanoma of upper back (HCC)  -     CBC and differential; Future  -     Comprehensive metabolic panel; Future  -     LD,Blood; Future    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 (HCC)  -     CBC and differential; Future  -     Comprehensive metabolic panel; Future  -     LD,Blood; Future    Encounter for follow-up surveillance of melanoma  -     CBC and differential; Future  -     Comprehensive metabolic panel; Future  -     LD,Blood; Future        Oncology History   Malignant melanoma of upper back (HCC)   12/2020 Biopsy    Left upper back shave biopsy:  - Malignant melanoma 2.3mm    Mitosis: 6  Ulceration: not identified     2/2/2021 Surgery    Wide excision with sentinel lymph node biopsy  Lymph Node, [place], number 1, biopsy:  - Metastatic melanoma in one lymph node (1/1, larger focus approximately 1.3 mm; subcapsular and intraparenchyma), no extranodal extension      Skin, left back, excision:  - Residual melanoma, 1.3 mm; prior biopsy site reaction.  - Inked margins with no tumor seen.        Lymph Node, [place], sentinel node #2, biopsy:  - Metastatic melanoma in one of ten lymph nodes (1/10, single and small nests of cells, subcapsular, larger focus 0.11 mm); no extranodal extension.       2/2/2021 -  Cancer Staged    Staging form: Melanoma of the Skin, AJCC 8th Edition  - Pathologic stage from 2/2/2021: Stage IIIB (pT3a, pN2a, cM0) - Signed by Maribel Bronson MD on 2/4/2022  Stage prefix: Initial diagnosis       4/8/2021 Genomic Testing    Decision DX: Class 1B     4/2021 - 4/2022 Chemotherapy     Tafinalar 150 mg b.i.d. Mekinist 2 mg p.o. Daily        Cancer Staging   Malignant melanoma of upper back (HCC)  Staging form:  Melanoma of the Skin, AJCC 8th Edition  - Pathologic stage from 2/2/2021: Stage IIIB (pT3a, pN2a, cM0) - Signed by Maribel Bronson MD on 2/4/2022          HISTORY OF PRESENT ILLNESS: Moris Mercado is a 48 y.o. male  who has Stage IIIB melanoma here for follow-up.    He is doing good. He denies having any issues or concerns. He has a follow-up with his primary care in 05/2024. He has a CT scan scheduled in August 2024, as scheduled by Dr. Siu. He denies any skin concerns.  Denies any new, changing, concerning skin lesions.  He denies any new lumps or bumps -no lymphadenopathy.    His CT of chest, abdomen, and pelvis on 01/22/2025 demonstrates no evidence of disease recurrence or metastasis.           Review of Systems   Constitutional:  Negative for appetite change, fatigue, fever and unexpected weight change.   HENT:   Negative for lump/mass, trouble swallowing and voice change.    Eyes:  Negative for icterus.   Respiratory:  Negative for cough, shortness of breath and wheezing.    Cardiovascular:  Negative for leg swelling.   Gastrointestinal:  Negative for abdominal pain, constipation, diarrhea, nausea and vomiting.   Genitourinary:  Negative for difficulty urinating and hematuria.    Musculoskeletal:  Negative for arthralgias, gait problem and myalgias.   Skin:  Negative for itching and rash.        No new, changing, or concerning lesions.   Neurological:  Negative for extremity weakness, gait problem, headaches, light-headedness and numbness.   Hematological:  Negative for adenopathy.        MEDICATIONS:    Current Outpatient Medications:   •  amLODIPine (NORVASC) 5 mg tablet, Take 1 tablet (5 mg total) by mouth daily, Disp: 90 tablet, Rfl: 0  •  lisinopril (ZESTRIL) 20 mg tablet, Take 1 tablet (20 mg total) by mouth daily, Disp: 90 tablet, Rfl: 2  •  sertraline (ZOLOFT) 50 mg tablet, Take 1 tablet (50 mg total) by mouth daily, Disp: 30 tablet, Rfl: 2     ALLERGIES:  No Known Allergies     ACTIVE  PROBLEMS:  Patient Active Problem List   Diagnosis   • Malignant melanoma of upper back (HCC)   • 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 (HCC)   • Primary hypertension   • Anxiety   • Mixed hyperlipidemia   • Hypertriglyceridemia   • Encounter for screening colonoscopy   • Encounter for follow-up surveillance of melanoma          PAST MEDICAL HISTORY:   History reviewed. No pertinent past medical history.     PAST SURGICAL HISTORY:  Past Surgical History:   Procedure Laterality Date   • LYMPH NODE BIOPSY N/A 2/2/2021    Procedure: BIOPSY LYMPH NODE LEFT AXILLARY SENTINEL;  Surgeon: Alber Siu MD;  Location: [facility];  Service: Surgical Oncology   • NO PAST SURGERIES     • SKIN LESION EXCISION Left 2/2/2021    Procedure: WIDE EXCISION MELANOMA SCAR UPPER BACK, intraoperative lymphatic mapping, sentinel lymph node biopsy; [address];  Surgeon: Alber Siu MD;  Location: [facility];  Service: Surgical Oncology        SOCIAL HISTORY:  Social History     Socioeconomic History   • Marital status: /Civil Union     Spouse name: None   • Number of children: None   • Years of education: None   • Highest education level: None   Occupational History   • None   Tobacco Use   • Smoking status: Never   • Smokeless tobacco: Never   Vaping Use   • Vaping status: Never Used   Substance and Sexual Activity   • Alcohol use: Yes   • Drug use: Never   • Sexual activity: None   Other Topics Concern   • None   Social History Narrative   • None     Social Determinants of Health     Financial Resource Strain: Not on file   Food Insecurity: Not on file   Transportation Needs: Not on file   Physical Activity: Not on file   Stress: Not on file   Social Connections: Not on file   Intimate Partner Violence: Not on file   Housing Stability: Not on file        FAMILY HISTORY:  Family History   Problem Relation Age of Onset   • Basal cell carcinoma Father    • No Known Problems Mother    • Breast cancer Paternal Aunt 42   • Breast  "cancer Paternal Grandmother 80           Objective    PHYSICAL EXAMINATION:   Blood pressure 126/74, pulse 78, temperature (!) 97 °F (36.1 °C), temperature source Temporal, resp. rate 16, height 5' 8\", weight 95.3 kg (210 lb), SpO2 97%.     Pain Score: 0-No pain     ECOG Performance Status      Flowsheet Row Most Recent Value   ECOG Performance Status 0 - Fully active, able to carry on all pre-disease performance without restriction               Physical Exam  Constitutional:       General: He is not in acute distress.     Appearance: Normal appearance. He is not ill-appearing or toxic-appearing.   HENT:      Head: Normocephalic and atraumatic.      Right Ear: External ear normal.      Left Ear: External ear normal.      Nose: Nose normal.      Mouth/Throat:      Mouth: Mucous membranes are moist.      Pharynx: Oropharynx is clear.   Eyes:      General: No scleral icterus.        Right eye: No discharge.         Left eye: No discharge.      Conjunctiva/sclera: Conjunctivae normal.   Cardiovascular:      Rate and Rhythm: Normal rate and regular rhythm.      Pulses: Normal pulses.      Heart sounds: No murmur heard.     No friction rub. No gallop.   Pulmonary:      Effort: Pulmonary effort is normal. No respiratory distress.      Breath sounds: Normal breath sounds. No wheezing or rales.   Abdominal:      General: Bowel sounds are normal. There is no distension.      Palpations: There is no mass.      Tenderness: There is no abdominal tenderness. There is no rebound.   Musculoskeletal:         General: No swelling or tenderness.      Cervical back: Normal range of motion. No rigidity.      Right lower leg: No edema.      Left lower leg: No edema.   Lymphadenopathy:      Head:      Right side of head: No submandibular, preauricular or posterior auricular adenopathy.      Left side of head: No submandibular, preauricular or posterior auricular adenopathy.      Cervical: No cervical adenopathy.      Right cervical: No " superficial or posterior cervical adenopathy.     Left cervical: No superficial or posterior cervical adenopathy.      Upper Body:      Right upper body: No supraclavicular or axillary adenopathy.      Left upper body: No supraclavicular or axillary adenopathy.      Lower Body: No right inguinal adenopathy. No left inguinal adenopathy.   Skin:     General: Skin is warm.      Coloration: Skin is not jaundiced.      Findings: No lesion or rash.      Comments: Well healed surgical scar.  No evidence of recurrence at primary site.   Neurological:      General: No focal deficit present.      Mental Status: He is alert and oriented to person, place, and time.      Cranial Nerves: No cranial nerve deficit.      Motor: No weakness.      Gait: Gait normal.   Psychiatric:         Mood and Affect: Mood normal.         Behavior: Behavior normal.         Thought Content: Thought content normal.         Judgment: Judgment normal.       I reviewed lab data in the chart.    WBC   Date Value Ref Range Status   01/17/2024 4.15 (L) 4.31 - 10.16 Thousand/uL Final   11/08/2023 4.35 4.31 - 10.16 Thousand/uL Final   04/07/2023 4.04 (L) 4.31 - 10.16 Thousand/uL Final     Hemoglobin   Date Value Ref Range Status   01/17/2024 15.0 12.0 - 17.0 g/dL Final   11/08/2023 15.1 12.0 - 17.0 g/dL Final   04/07/2023 15.0 12.0 - 17.0 g/dL Final     Platelets   Date Value Ref Range Status   01/17/2024 153 149 - 390 Thousands/uL Final   11/08/2023 181 149 - 390 Thousands/uL Final   04/07/2023 164 149 - 390 Thousands/uL Final     MCV   Date Value Ref Range Status   01/17/2024 96 82 - 98 fL Final   11/08/2023 95 82 - 98 fL Final   04/07/2023 97 82 - 98 fL Final      Potassium   Date Value Ref Range Status   01/17/2024 3.8 3.5 - 5.3 mmol/L Final   11/08/2023 4.1 3.5 - 5.3 mmol/L Final   04/07/2023 4.1 3.5 - 5.3 mmol/L Final     Chloride   Date Value Ref Range Status   01/17/2024 101 96 - 108 mmol/L Final   11/08/2023 104 96 - 108 mmol/L Final   04/07/2023  101 96 - 108 mmol/L Final     CO2   Date Value Ref Range Status   01/17/2024 26 21 - 32 mmol/L Final   11/08/2023 22 21 - 32 mmol/L Final   04/07/2023 25 21 - 32 mmol/L Final     BUN   Date Value Ref Range Status   01/17/2024 18 5 - 25 mg/dL Final   11/08/2023 23 5 - 25 mg/dL Final   04/07/2023 20 5 - 25 mg/dL Final     Creatinine   Date Value Ref Range Status   01/17/2024 1.29 0.60 - 1.30 mg/dL Final     Comment:     Standardized to IDMS reference method   11/08/2023 1.41 (H) 0.60 - 1.30 mg/dL Final     Comment:     Standardized to IDMS reference method   04/07/2023 1.30 0.60 - 1.30 mg/dL Final     Comment:     Standardized to IDMS reference method     Calcium   Date Value Ref Range Status   01/17/2024 9.3 8.4 - 10.2 mg/dL Final   11/08/2023 9.4 8.4 - 10.2 mg/dL Final   04/07/2023 9.7 8.4 - 10.2 mg/dL Final     Albumin   Date Value Ref Range Status   01/17/2024 4.5 3.5 - 5.0 g/dL Final   11/08/2023 4.5 3.5 - 5.0 g/dL Final   04/07/2023 4.6 3.5 - 5.0 g/dL Final     Total Bilirubin   Date Value Ref Range Status   01/17/2024 0.66 0.20 - 1.00 mg/dL Final     Comment:     Use of this assay is not recommended for patients undergoing treatment with eltrombopag due to the potential for falsely elevated results.  N-acetyl-p-benzoquinone imine (metabolite of Acetaminophen) will generate erroneously low results in samples for patients that have taken an overdose of Acetaminophen.   11/08/2023 0.68 0.20 - 1.00 mg/dL Final     Comment:     Use of this assay is not recommended for patients undergoing treatment with eltrombopag due to the potential for falsely elevated results.  N-acetyl-p-benzoquinone imine (metabolite of Acetaminophen) will generate erroneously low results in samples for patients that have taken an overdose of Acetaminophen.   04/07/2023 0.77 0.20 - 1.00 mg/dL Final     Comment:     Use of this assay is not recommended for patients undergoing treatment with eltrombopag due to the potential for falsely elevated  "results.  N-acetyl-p-benzoquinone imine (metabolite of Acetaminophen) will generate erroneously low results in samples for patients that have taken an overdose of Acetaminophen.     Alkaline Phosphatase   Date Value Ref Range Status   01/17/2024 53 34 - 104 U/L Final   11/08/2023 53 34 - 104 U/L Final   04/07/2023 38 34 - 104 U/L Final     AST   Date Value Ref Range Status   01/17/2024 28 13 - 39 U/L Final   11/08/2023 26 13 - 39 U/L Final   04/07/2023 32 13 - 39 U/L Final     ALT   Date Value Ref Range Status   01/17/2024 40 7 - 52 U/L Final     Comment:     Specimen collection should occur prior to Sulfasalazine administration due to the potential for falsely depressed results.    11/08/2023 34 7 - 52 U/L Final     Comment:     Specimen collection should occur prior to Sulfasalazine administration due to the potential for falsely depressed results.    04/07/2023 49 7 - 52 U/L Final     Comment:     Specimen collection should occur prior to Sulfasalazine administration due to the potential for falsely depressed results.       LDH   Date Value Ref Range Status   11/08/2023 167 121 - 224 IU/L Final   04/07/2023 161 121 - 224 IU/L Final     LD   Date Value Ref Range Status   01/17/2024 150 140 - 271 U/L Final   11/08/2023 154 140 - 271 U/L Final   04/07/2023 162 140 - 271 U/L Final     No results found for: \"TSH\"  No results found for: \"J5PYBXF\"   No results found for: \"FREET4\"      RECENT IMAGING:  Procedure: US axilla    Result Date: 1/29/2024  Narrative: ULTRASOUND EXTREMITY SOFT TISSUE TECHNIQUE:    Real-time ultrasound of the left axilla was performed with a linear transducer with both volumetric sweeps and still imaging techniques. INDICATION:   Z08: Encounter for follow-up examination after completed treatment for malignant neoplasm Z85.820: Personal history of malignant melanoma of skin. COMPARISON: Ultrasound 4/18/2023 and 4/22/2023. CT chest 1/12/2024 and 4/12/2023. FINDINGS: Targeted ultrasound of the left " axilla in region of prior surgical scar was performed. Stable lymph node with normal morphology including fatty hilum and nonthickened cortex medial to the surgical scar measuring one 0.8 x 1.0 x 1.2 cm, previously previously 0.8 x 1.0 x 1.6 cm. Stable lymph node with normal morphology including fatty hilum and nonthickened cortex inferior and medial to the surgical scar measuring 0.8 x 1.2 x 2.0 cm, previously 1.2 x 1.4 x 2.1 cm. No new suspicious findings within the left axilla.     Impression: Continued stability of left axillary lymph nodes with benign-appearing morphology. Attention on follow-up imaging as clinically indicated. Workstation performed: [id number]     Procedure: CT chest abdomen pelvis w contrast    Result Date: 1/23/2024  Narrative: CT CHEST, ABDOMEN AND PELVIS WITH IV CONTRAST INDICATION: Z08: Encounter for follow-up examination after completed treatment for malignant neoplasm Z85.820: Personal history of malignant melanoma of skin. COMPARISON: Multiple examinations dating back to 2/26/2021 TECHNIQUE: CT examination of the chest, abdomen and pelvis was performed. Scanning through the abdomen was performed in arterial, venous and delayed phases according a protocol specifically designed to evaluate the upper abdominal viscera. Multiplanar 2D  reformatted images were created from the source data. This examination, like all CT scans performed in the [facility], was performed utilizing techniques to minimize radiation dose exposure, including the use of iterative reconstruction and automated exposure control. Radiation dose length product (DLP) for this visit: 2156.98 mGy-cm IV Contrast: 100 mL of iohexol (OMNIPAQUE) Enteric Contrast: Administered. FINDINGS: CHEST LUNGS: Scattered faint groundglass opacities are again noted bilaterally. No pulmonary nodules. No tracheal or endobronchial lesion. PLEURA: Unremarkable. HEART/GREAT VESSELS: Heart is unremarkable for patient's age. No  thoracic aortic aneurysm. MEDIASTINUM AND MAGALYS: Unremarkable. CHEST WALL AND LOWER NECK: Prior surgical changes in the left upper back soft tissues. No appreciable nodularity or masslike enhancement. Surgical clips in the left axilla. ABDOMEN LIVER/BILIARY TREE: Hepatic steatosis. No suspicious mass. Low-attenuation lesion in the right hepatic lobe measuring 1.2 cm and is unchanged. No enhancement suggesting a cyst. Normal hepatic contours. No biliary dilation. GALLBLADDER: No calcified gallstones. No pericholecystic inflammatory change. SPLEEN: Unremarkable. PANCREAS: Unremarkable. ADRENAL GLANDS: Unremarkable. KIDNEYS/URETERS: Unremarkable. No hydronephrosis. Left renal cyst measuring 1.6 cm STOMACH AND BOWEL: Colonic diverticulosis without findings of acute diverticulitis. APPENDIX: No findings to suggest appendicitis. ABDOMINOPELVIC CAVITY: No ascites. No pneumoperitoneum. No lymphadenopathy. VESSELS: Unremarkable for patient's age. PELVIS REPRODUCTIVE ORGANS: Prostate calcifications. URINARY BLADDER: Under distention diminishing diagnostic assessment. No calculi. Mild circumferential urinary bladder wall thickening. May be due to under distention. ABDOMINAL WALL/INGUINAL REGIONS: Unremarkable. BONES: No acute fracture or suspicious osseous lesion. Pars defects bilaterally at L3.     Impression: 1. Postsurgical changes at the level of the left upper back without evidence of nodularity or masslike enhancement to suggest recurrent or residual disease. 2. Scattered groundglass opacities in the lungs with similar appearance to the previous exam 3. No pathologically enlarged mediastinal, abdominal or pelvic lymphadenopathy. 4. Stable low-attenuation lesion in the right hepatic lobe without appreciable enhancement and felt to be compatible with a cyst. 5. Hepatic steatosis. Workstation performed: [id number]            Assessment/Plan  1. Malignant melanoma of upper back (HCC)  -     CBC and differential; Future;  Expected date: 04/30/2024  -     Comprehensive metabolic panel; Future; Expected date: 04/30/2024  -     LD,Blood; Future; Expected date: 04/30/2024    2. 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 (HCC)  -     CBC and differential; Future; Expected date: 04/30/2024  -     Comprehensive metabolic panel; Future; Expected date: 04/30/2024  -     LD,Blood; Future; Expected date: 04/30/2024    3. Encounter for follow-up surveillance of melanoma  Assessment & Plan:  The patient is a 48-year-old male with stage 3b melanoma status post 1 year of adjuvant treatment with tafinlar and mekinist here for continued monitoring, follow-up, and surveillance. He is doing well. He has no clinical evidence of melanoma recurrence or metastasis. We discussed imaging that he had on 01/22/2025 with a CT chest, abdomen, and pelvis that demonstrate no evidence of disease recurrence or metastasis. He also had a left axillary ultrasound done on the same day with continued stability of left axillary lymph nodes and benign appearing morphology. We will continue to follow him closely. He is due for follow-up with a full body check, exam, and blood work in 3 months. Orders placed and prescription provided for blood work. Dr. Siu has actually ordered him for follow-up imaging in 6 months. He knows how to monitor for any new, changing, concerning skin lesions. He knows to call with any issues or concerns prior to his next visit.    Orders:  -     CBC and differential; Future; Expected date: 04/30/2024  -     Comprehensive metabolic panel; Future; Expected date: 04/30/2024  -     LD,Blood; Future; Expected date: 04/30/2024         Return in about 3 months (around 4/30/2024) for Office Visit, labs.     Maribel Bronson MD, PhD    Transcribed for Maribel Brosnon MD, by BISMITA BEHERA on 01/31/24 at 1:57 PM. Powered by Dragon [employer].

## 2024-02-05 DIAGNOSIS — I10 HYPERTENSION, UNSPECIFIED TYPE: ICD-10-CM

## 2024-02-05 DIAGNOSIS — I10 PRIMARY HYPERTENSION: ICD-10-CM

## 2024-02-05 DIAGNOSIS — F41.9 ANXIETY: ICD-10-CM

## 2024-02-06 RX ORDER — LISINOPRIL 20 MG/1
20 TABLET ORAL DAILY
Qty: 90 TABLET | Refills: 0 | Status: SHIPPED | OUTPATIENT
Start: 2024-02-06

## 2024-02-06 RX ORDER — AMLODIPINE BESYLATE 5 MG/1
5 TABLET ORAL DAILY
Qty: 90 TABLET | Refills: 0 | Status: SHIPPED | OUTPATIENT
Start: 2024-02-06 | End: 2024-05-06

## 2024-04-29 ENCOUNTER — TELEPHONE (OUTPATIENT)
Dept: HEMATOLOGY ONCOLOGY | Facility: CLINIC | Age: 49
End: 2024-04-29

## 2024-04-29 NOTE — TELEPHONE ENCOUNTER
Appointment Change  Cancel, Reschedule, Change to Virtual      Who are you speaking with? Patient   If it is not the patient, is the caller listed on the communication consent form? N/A   Which provider is the appointment scheduled with? Dr. Bronson   When was the original appointment scheduled?    Please list date and time 05/06/2024 @ 8AM    At which location is the appointment scheduled to take place? [place]   Was the appointment rescheduled?     Was the appointment changed from an in person visit to a virtual visit?    If so, please list the details of the change.            Yes, 06/24/2024 @ 10:20AM    What is the reason for the appointment change? Scheduling conflict

## 2024-05-12 DIAGNOSIS — I10 PRIMARY HYPERTENSION: ICD-10-CM

## 2024-05-13 RX ORDER — LISINOPRIL 20 MG/1
20 TABLET ORAL DAILY
Qty: 90 TABLET | Refills: 0 | Status: SHIPPED | OUTPATIENT
Start: 2024-05-13

## 2024-06-11 DIAGNOSIS — F41.9 ANXIETY: ICD-10-CM

## 2024-06-11 DIAGNOSIS — I10 HYPERTENSION, UNSPECIFIED TYPE: ICD-10-CM

## 2024-06-11 DIAGNOSIS — I10 PRIMARY HYPERTENSION: ICD-10-CM

## 2024-06-11 RX ORDER — LISINOPRIL 20 MG/1
20 TABLET ORAL DAILY
Qty: 30 TABLET | Refills: 0 | Status: SHIPPED | OUTPATIENT
Start: 2024-06-11

## 2024-06-11 RX ORDER — AMLODIPINE BESYLATE 5 MG/1
5 TABLET ORAL DAILY
Qty: 30 TABLET | Refills: 0 | Status: SHIPPED | OUTPATIENT
Start: 2024-06-11 | End: 2024-09-09

## 2024-07-09 DIAGNOSIS — I10 HYPERTENSION, UNSPECIFIED TYPE: ICD-10-CM

## 2024-07-09 DIAGNOSIS — F41.9 ANXIETY: ICD-10-CM

## 2024-07-09 DIAGNOSIS — I10 PRIMARY HYPERTENSION: ICD-10-CM

## 2024-07-09 RX ORDER — LISINOPRIL 20 MG/1
20 TABLET ORAL DAILY
Qty: 30 TABLET | Refills: 0 | Status: SHIPPED | OUTPATIENT
Start: 2024-07-09

## 2024-07-09 RX ORDER — AMLODIPINE BESYLATE 5 MG/1
5 TABLET ORAL DAILY
Qty: 30 TABLET | Refills: 0 | Status: SHIPPED | OUTPATIENT
Start: 2024-07-09 | End: 2024-10-07

## 2024-08-09 DIAGNOSIS — F41.9 ANXIETY: ICD-10-CM

## 2024-08-09 DIAGNOSIS — I10 PRIMARY HYPERTENSION: ICD-10-CM

## 2024-08-09 DIAGNOSIS — I10 HYPERTENSION, UNSPECIFIED TYPE: ICD-10-CM

## 2024-08-09 RX ORDER — AMLODIPINE BESYLATE 5 MG/1
5 TABLET ORAL DAILY
Qty: 30 TABLET | Refills: 0 | Status: SHIPPED | OUTPATIENT
Start: 2024-08-09 | End: 2024-11-07

## 2024-08-09 RX ORDER — LISINOPRIL 20 MG/1
20 TABLET ORAL DAILY
Qty: 30 TABLET | Refills: 0 | Status: SHIPPED | OUTPATIENT
Start: 2024-08-09

## 2024-09-06 DIAGNOSIS — I10 HYPERTENSION, UNSPECIFIED TYPE: ICD-10-CM

## 2024-09-06 DIAGNOSIS — F41.9 ANXIETY: ICD-10-CM

## 2024-09-06 RX ORDER — AMLODIPINE BESYLATE 5 MG/1
5 TABLET ORAL DAILY
Qty: 30 TABLET | Refills: 0 | Status: SHIPPED | OUTPATIENT
Start: 2024-09-06 | End: 2024-12-05

## 2024-10-10 DIAGNOSIS — I10 HYPERTENSION, UNSPECIFIED TYPE: ICD-10-CM

## 2024-10-10 DIAGNOSIS — F41.9 ANXIETY: ICD-10-CM

## 2024-10-10 DIAGNOSIS — I10 PRIMARY HYPERTENSION: ICD-10-CM

## 2024-10-11 RX ORDER — AMLODIPINE BESYLATE 5 MG/1
5 TABLET ORAL DAILY
Qty: 30 TABLET | Refills: 0 | Status: SHIPPED | OUTPATIENT
Start: 2024-10-11 | End: 2025-01-09

## 2024-10-11 RX ORDER — LISINOPRIL 20 MG/1
20 TABLET ORAL DAILY
Qty: 30 TABLET | Refills: 0 | Status: SHIPPED | OUTPATIENT
Start: 2024-10-11

## 2024-11-14 DIAGNOSIS — I10 PRIMARY HYPERTENSION: ICD-10-CM

## 2024-11-14 DIAGNOSIS — I10 HYPERTENSION, UNSPECIFIED TYPE: ICD-10-CM

## 2024-11-15 DIAGNOSIS — I10 PRIMARY HYPERTENSION: Primary | ICD-10-CM

## 2024-11-15 DIAGNOSIS — R73.01 ELEVATED FASTING GLUCOSE: ICD-10-CM

## 2024-11-15 DIAGNOSIS — E78.2 MIXED HYPERLIPIDEMIA: ICD-10-CM

## 2024-11-15 RX ORDER — LISINOPRIL 20 MG/1
20 TABLET ORAL DAILY
Qty: 30 TABLET | Refills: 0 | Status: SHIPPED | OUTPATIENT
Start: 2024-11-15

## 2024-11-15 RX ORDER — AMLODIPINE BESYLATE 5 MG/1
5 TABLET ORAL DAILY
Qty: 30 TABLET | Refills: 0 | Status: SHIPPED | OUTPATIENT
Start: 2024-11-15 | End: 2025-02-13

## 2025-01-05 DIAGNOSIS — I10 HYPERTENSION, UNSPECIFIED TYPE: ICD-10-CM

## 2025-01-05 DIAGNOSIS — I10 PRIMARY HYPERTENSION: ICD-10-CM

## 2025-01-07 RX ORDER — AMLODIPINE BESYLATE 5 MG/1
5 TABLET ORAL DAILY
Qty: 30 TABLET | Refills: 0 | Status: SHIPPED | OUTPATIENT
Start: 2025-01-07 | End: 2025-04-07

## 2025-01-07 RX ORDER — LISINOPRIL 20 MG/1
20 TABLET ORAL DAILY
Qty: 30 TABLET | Refills: 0 | Status: SHIPPED | OUTPATIENT
Start: 2025-01-07

## 2025-01-10 ENCOUNTER — APPOINTMENT (OUTPATIENT)
Dept: LAB | Facility: HOSPITAL | Age: 50
End: 2025-01-10
Payer: COMMERCIAL

## 2025-01-10 DIAGNOSIS — C43.59 MALIGNANT MELANOMA OF UPPER BACK (HCC): ICD-10-CM

## 2025-01-10 DIAGNOSIS — R73.01 ELEVATED FASTING GLUCOSE: ICD-10-CM

## 2025-01-10 DIAGNOSIS — E78.2 MIXED HYPERLIPIDEMIA: ICD-10-CM

## 2025-01-10 DIAGNOSIS — Z85.820 ENCOUNTER FOR FOLLOW-UP SURVEILLANCE OF MELANOMA: ICD-10-CM

## 2025-01-10 DIAGNOSIS — Z08 ENCOUNTER FOR FOLLOW-UP SURVEILLANCE OF MELANOMA: ICD-10-CM

## 2025-01-10 DIAGNOSIS — C77.9 MELANOMA METASTATIC TO LYMPH NODE (HCC): ICD-10-CM

## 2025-01-10 DIAGNOSIS — I10 PRIMARY HYPERTENSION: ICD-10-CM

## 2025-01-10 LAB
ALBUMIN SERPL BCG-MCNC: 4.7 G/DL (ref 3.5–5)
ALP SERPL-CCNC: 40 U/L (ref 34–104)
ALT SERPL W P-5'-P-CCNC: 60 U/L (ref 7–52)
ANION GAP SERPL CALCULATED.3IONS-SCNC: 8 MMOL/L (ref 4–13)
AST SERPL W P-5'-P-CCNC: 33 U/L (ref 13–39)
BASOPHILS # BLD AUTO: 0.01 THOUSANDS/ΜL (ref 0–0.1)
BASOPHILS NFR BLD AUTO: 0 % (ref 0–1)
BILIRUB SERPL-MCNC: 0.59 MG/DL (ref 0.2–1)
BUN SERPL-MCNC: 25 MG/DL (ref 5–25)
CALCIUM SERPL-MCNC: 9.7 MG/DL (ref 8.4–10.2)
CHLORIDE SERPL-SCNC: 105 MMOL/L (ref 96–108)
CHOLEST SERPL-MCNC: 195 MG/DL (ref ?–200)
CO2 SERPL-SCNC: 24 MMOL/L (ref 21–32)
CREAT SERPL-MCNC: 1.49 MG/DL (ref 0.6–1.3)
EOSINOPHIL # BLD AUTO: 0.07 THOUSAND/ΜL (ref 0–0.61)
EOSINOPHIL NFR BLD AUTO: 2 % (ref 0–6)
ERYTHROCYTE [DISTWIDTH] IN BLOOD BY AUTOMATED COUNT: 13.4 % (ref 11.6–15.1)
EST. AVERAGE GLUCOSE BLD GHB EST-MCNC: 117 MG/DL
GFR SERPL CREATININE-BSD FRML MDRD: 53 ML/MIN/1.73SQ M
GLUCOSE P FAST SERPL-MCNC: 98 MG/DL (ref 65–99)
HBA1C MFR BLD: 5.7 %
HCT VFR BLD AUTO: 46 % (ref 36.5–49.3)
HDLC SERPL-MCNC: 35 MG/DL
HGB BLD-MCNC: 15 G/DL (ref 12–17)
IMM GRANULOCYTES # BLD AUTO: 0.01 THOUSAND/UL (ref 0–0.2)
IMM GRANULOCYTES NFR BLD AUTO: 0 % (ref 0–2)
LDH SERPL-CCNC: 141 U/L (ref 140–271)
LDLC SERPL CALC-MCNC: 134 MG/DL (ref 0–100)
LYMPHOCYTES # BLD AUTO: 1.63 THOUSANDS/ΜL (ref 0.6–4.47)
LYMPHOCYTES NFR BLD AUTO: 35 % (ref 14–44)
MCH RBC QN AUTO: 30.8 PG (ref 26.8–34.3)
MCHC RBC AUTO-ENTMCNC: 32.6 G/DL (ref 31.4–37.4)
MCV RBC AUTO: 95 FL (ref 82–98)
MONOCYTES # BLD AUTO: 0.33 THOUSAND/ΜL (ref 0.17–1.22)
MONOCYTES NFR BLD AUTO: 7 % (ref 4–12)
NEUTROPHILS # BLD AUTO: 2.61 THOUSANDS/ΜL (ref 1.85–7.62)
NEUTS SEG NFR BLD AUTO: 56 % (ref 43–75)
NONHDLC SERPL-MCNC: 160 MG/DL
NRBC BLD AUTO-RTO: 0 /100 WBCS
PLATELET # BLD AUTO: 179 THOUSANDS/UL (ref 149–390)
PMV BLD AUTO: 10.3 FL (ref 8.9–12.7)
POTASSIUM SERPL-SCNC: 4.1 MMOL/L (ref 3.5–5.3)
PROT SERPL-MCNC: 7.7 G/DL (ref 6.4–8.4)
RBC # BLD AUTO: 4.87 MILLION/UL (ref 3.88–5.62)
SODIUM SERPL-SCNC: 137 MMOL/L (ref 135–147)
TRIGL SERPL-MCNC: 132 MG/DL (ref ?–150)
TSH SERPL DL<=0.05 MIU/L-ACNC: 1.78 UIU/ML (ref 0.45–4.5)
WBC # BLD AUTO: 4.66 THOUSAND/UL (ref 4.31–10.16)

## 2025-01-10 PROCEDURE — 84443 ASSAY THYROID STIM HORMONE: CPT

## 2025-01-10 PROCEDURE — 83615 LACTATE (LD) (LDH) ENZYME: CPT

## 2025-01-10 PROCEDURE — 83625 ASSAY OF LDH ENZYMES: CPT

## 2025-01-10 PROCEDURE — 80053 COMPREHEN METABOLIC PANEL: CPT

## 2025-01-10 PROCEDURE — 83036 HEMOGLOBIN GLYCOSYLATED A1C: CPT

## 2025-01-10 PROCEDURE — 85025 COMPLETE CBC W/AUTO DIFF WBC: CPT

## 2025-01-10 PROCEDURE — 36415 COLL VENOUS BLD VENIPUNCTURE: CPT

## 2025-01-10 PROCEDURE — 80061 LIPID PANEL: CPT

## 2025-01-11 ENCOUNTER — RESULTS FOLLOW-UP (OUTPATIENT)
Dept: FAMILY MEDICINE CLINIC | Facility: CLINIC | Age: 50
End: 2025-01-11

## 2025-01-13 LAB
LDH SERPL-CCNC: 182 IU/L (ref 121–224)
LDH1 CFR SERPL ELPH: 18 % (ref 17–32)
LDH2 CFR SERPL ELPH: 27 % (ref 25–40)
LDH3 CFR SERPL ELPH: 24 % (ref 17–27)
LDH4 CFR SERPL ELPH: 12 % (ref 5–13)
LDH5 CFR SERPL ELPH: 19 % (ref 4–20)

## 2025-01-13 NOTE — TELEPHONE ENCOUNTER
Pt called in to return missed call from office. Triage nurse read back PCPs message and pt verbalized he is agreeable to taking a statin, he does admit he drank less fluids, ate bad over the holidays and was taking a lot of NSAIDs. Pt would like to know what kind of statin PCPs wants pt on and to call it into his pharmacy on file.  PCP please update pt.

## 2025-01-13 NOTE — TELEPHONE ENCOUNTER
----- Message from LESTER Bello sent at 1/11/2025  9:39 AM EST -----  Remains prediabetic. LDL above goal in lipid panel.  ASCVD risk (cardiac risk) borderline - recommend starting statin therapy and repeating in 3 months.    Creatinine also elevated - was patient recently sick or taking in less fluids? Using excessive NSAIDs?

## 2025-01-20 ENCOUNTER — HOSPITAL ENCOUNTER (OUTPATIENT)
Dept: ULTRASOUND IMAGING | Facility: HOSPITAL | Age: 50
Discharge: HOME | End: 2025-01-20
Payer: COMMERCIAL

## 2025-01-20 ENCOUNTER — HOSPITAL ENCOUNTER (OUTPATIENT)
Dept: CT IMAGING | Facility: HOSPITAL | Age: 50
Discharge: HOME | End: 2025-01-20
Payer: COMMERCIAL

## 2025-01-20 DIAGNOSIS — C43.59 MALIGNANT MELANOMA OF UPPER BACK (HCC): ICD-10-CM

## 2025-01-20 DIAGNOSIS — C77.9 MELANOMA METASTATIC TO LYMPH NODE (HCC): ICD-10-CM

## 2025-01-20 PROCEDURE — G1004 CDSM NDSC: HCPCS

## 2025-01-20 PROCEDURE — 74177 CT ABD & PELVIS W/CONTRAST: CPT

## 2025-01-20 PROCEDURE — 71260 CT THORAX DX C+: CPT

## 2025-01-20 PROCEDURE — 76882 US LMTD JT/FCL EVL NVASC XTR: CPT

## 2025-01-20 RX ADMIN — IOHEXOL 100 ML: 350 INJECTION, SOLUTION INTRAVENOUS at 08:01

## 2025-01-24 PROBLEM — Z85.820 PERSONAL HISTORY OF MALIGNANT MELANOMA: Status: ACTIVE | Noted: 2025-01-24

## 2025-01-29 ENCOUNTER — OFFICE VISIT (OUTPATIENT)
Dept: HEMATOLOGY ONCOLOGY | Facility: CLINIC | Age: 50
End: 2025-01-29
Payer: COMMERCIAL

## 2025-01-29 ENCOUNTER — TELEPHONE (OUTPATIENT)
Age: 50
End: 2025-01-29

## 2025-01-29 VITALS
HEIGHT: 68 IN | HEART RATE: 97 BPM | OXYGEN SATURATION: 95 % | WEIGHT: 225 LBS | BODY MASS INDEX: 34.1 KG/M2 | TEMPERATURE: 97.6 F | DIASTOLIC BLOOD PRESSURE: 76 MMHG | RESPIRATION RATE: 18 BRPM | SYSTOLIC BLOOD PRESSURE: 128 MMHG

## 2025-01-29 DIAGNOSIS — C43.59 MALIGNANT MELANOMA OF UPPER BACK (HCC): ICD-10-CM

## 2025-01-29 DIAGNOSIS — C77.9 MELANOMA METASTATIC TO LYMPH NODE (HCC): ICD-10-CM

## 2025-01-29 DIAGNOSIS — Z08 ENCOUNTER FOR FOLLOW-UP SURVEILLANCE OF MELANOMA: Primary | ICD-10-CM

## 2025-01-29 DIAGNOSIS — Z85.820 ENCOUNTER FOR FOLLOW-UP SURVEILLANCE OF MELANOMA: Primary | ICD-10-CM

## 2025-01-29 PROCEDURE — 99214 OFFICE O/P EST MOD 30 MIN: CPT | Performed by: INTERNAL MEDICINE

## 2025-01-29 NOTE — ASSESSMENT & PLAN NOTE
He is doing well with no clinical evidence of melanoma recurrence.  We discussed that imaging from 1/20/2025 with a CT chest, abdomen pelvis was also negative for melanoma recurrence or metastatic disease.  Stable postsurgical changes.  Also underwent an ultrasound of the left axilla on 1/20/2025 that was negative for concerning lymph nodes.  Blood work reviewed and okay.    He will continue to monitor for any new, changing, concerning skin lesions or lymphadenopathy.  He knows to call with issues or concerns prior to his next visit.  We discussed that he is 4 years out from diagnosis.  He will return in 6 months for follow-up with blood work and imaging to be done at that time.  All orders placed and prescription provided.    Orders:    CT chest abdomen pelvis w contrast; Future    CBC and differential; Future    Comprehensive metabolic panel; Future    LD,Blood; Future

## 2025-01-29 NOTE — PROGRESS NOTES
Name: Moris Mercado      : 1975      MRN: [id number]  Encounter Provider: Maribel Bronson MD  Encounter Date: 2025   Encounter department: [facility]  :  Assessment & Plan  Encounter for follow-up surveillance of melanoma  He is doing well with no clinical evidence of melanoma recurrence.  We discussed that imaging from 2025 with a CT chest, abdomen pelvis was also negative for melanoma recurrence or metastatic disease.  Stable postsurgical changes.  Also underwent an ultrasound of the left axilla on 2025 that was negative for concerning lymph nodes.  Blood work reviewed and okay.    He will continue to monitor for any new, changing, concerning skin lesions or lymphadenopathy.  He knows to call with issues or concerns prior to his next visit.  We discussed that he is 4 years out from diagnosis.  He will return in 6 months for follow-up with blood work and imaging to be done at that time.  All orders placed and prescription provided.      Malignant melanoma of upper back (HCC)  He is doing well with no clinical evidence of melanoma recurrence.  We discussed that imaging from 2025 with a CT chest, abdomen pelvis was also negative for melanoma recurrence or metastatic disease.  Stable postsurgical changes.  Also underwent an ultrasound of the left axilla on 2025 that was negative for concerning lymph nodes.  Blood work reviewed and okay.    He will continue to monitor for any new, changing, concerning skin lesions or lymphadenopathy.  He knows to call with issues or concerns prior to his next visit.  We discussed that he is 4 years out from diagnosis.  He will return in 6 months for follow-up with blood work and imaging to be done at that time.  All orders placed and prescription provided.    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  We discussed that imaging from 1/20/2025 with a CT chest, abdomen pelvis was also negative for melanoma recurrence or metastatic disease.  Stable postsurgical changes.  Also underwent an ultrasound of the left axilla on 1/20/2025 that was negative for concerning lymph nodes.  Blood work reviewed and okay.    He will continue to monitor for any new, changing, concerning skin lesions or lymphadenopathy.  He knows to call with issues or concerns prior to his next visit.  We discussed that he is 4 years out from diagnosis.  He will return in 6 months for follow-up with blood work and imaging to be done at that time.  All orders placed and prescription provided.    Orders:    CT chest abdomen pelvis w contrast; Future    CBC and differential; Future    Comprehensive metabolic panel; Future    LD,Blood; Future        History of Present Illness   Chief Complaint   Patient presents with    Follow-up     He is doing well.  Energy is good.  Eating okay.  No new, changing, concerning skin lesions.  No lymphadenopathy.  No concerns today.    He did undergo imaging prior to today's visit.  He had an ultrasound of the left axilla on 1/20/2025 that demonstrates no concerning lymphadenopathy.  Also had a CT chest, abdomen pelvis on 1/20/2025 that demonstrates no evidence of melanoma recurrence or metastatic disease.  Stable postsurgical changes in the left upper back.        Oncology History   Cancer Staging   Malignant melanoma of upper back (HCC)  Staging form: Melanoma of the Skin, AJCC 8th Edition  - Pathologic stage from 2/2/2021: Stage IIIB (pT3a, pN2a, cM0) - Signed by Maribel Bronson MD on 2/4/2022  Stage prefix: Initial diagnosis  Oncology History   Malignant melanoma of upper back (HCC)   12/2020 Biopsy    Left upper back shave biopsy:  - Malignant melanoma 2.3mm    Mitosis: 6  Ulceration: not  identified     2/2/2021 Surgery    Wide excision with sentinel lymph node biopsy  Lymph Node, [place], number 1, biopsy:  - Metastatic melanoma in one lymph node (1/1, larger focus approximately 1.3 mm; subcapsular and intraparenchyma), no extranodal extension      Skin, left back, excision:  - Residual melanoma, 1.3 mm; prior biopsy site reaction.  - Inked margins with no tumor seen.        Lymph Node, [place], sentinel node #2, biopsy:  - Metastatic melanoma in one of ten lymph nodes (1/10, single and small nests of cells, subcapsular, larger focus 0.11 mm); no extranodal extension.       2/2/2021 -  Cancer Staged    Staging form: Melanoma of the Skin, AJCC 8th Edition  - Pathologic stage from 2/2/2021: Stage IIIB (pT3a, pN2a, cM0) - Signed by Maribel Bronson MD on 2/4/2022  Stage prefix: Initial diagnosis       4/8/2021 Genomic Testing    Decision DX: Class 1B     4/2021 - 4/2022 Chemotherapy     Tafinalar 150 mg b.i.d. Mekinist 2 mg p.o. Daily          Review of Systems   Constitutional:  Negative for activity change, chills, diaphoresis, fatigue, fever and unexpected weight change.   HENT:  Negative for congestion, hearing loss, trouble swallowing and voice change.    Respiratory:  Negative for cough, shortness of breath and wheezing.    Cardiovascular:  Negative for chest pain, palpitations and leg swelling.   Gastrointestinal:  Negative for abdominal distention, abdominal pain, constipation, diarrhea, nausea and vomiting.   Musculoskeletal:  Negative for arthralgias and myalgias.   Skin:  Negative for color change and rash.   Neurological:  Negative for dizziness, seizures, speech difficulty, weakness, light-headedness and headaches.   Hematological:  Negative for adenopathy.     Current Outpatient Medications on File Prior to Visit   Medication Sig Dispense Refill    amLODIPine (NORVASC) 5 mg tablet Take 1 tablet (5 mg total) by mouth daily 30 tablet 0    lisinopril (ZESTRIL) 20 mg tablet Take 1 tablet  "(20 mg total) by mouth daily 30 tablet 0    sertraline (ZOLOFT) 50 mg tablet Take 1 tablet (50 mg total) by mouth daily 30 tablet 0     No current facility-administered medications on file prior to visit.          Objective   /76 (BP Location: Left arm, Patient Position: Sitting, Cuff Size: Adult)   Pulse 97   Temp 97.6 °F (36.4 °C) (Temporal)   Resp 18   Ht 5' 8\" (1.727 m)   Wt 102 kg (225 lb)   SpO2 95%   BMI 34.21 kg/m²     Pain Screening:  Pain Score: 0-No pain  ECOG ECOG Performance Status: 0 - Fully active, able to carry on all pre-disease performance without restriction     Physical Exam  Constitutional:       General: He is not in acute distress.     Appearance: Normal appearance. He is not ill-appearing.   HENT:      Head: Normocephalic and atraumatic.      Right Ear: External ear normal.      Left Ear: External ear normal.      Nose: Nose normal. No congestion.      Mouth/Throat:      Mouth: Mucous membranes are moist.      Pharynx: Oropharynx is clear. No oropharyngeal exudate.   Eyes:      General: No scleral icterus.        Right eye: No discharge.         Left eye: No discharge.      Conjunctiva/sclera: Conjunctivae normal.   Cardiovascular:      Rate and Rhythm: Normal rate and regular rhythm.      Pulses: Normal pulses.      Heart sounds: No murmur heard.     No friction rub. No gallop.   Pulmonary:      Effort: Pulmonary effort is normal. No respiratory distress.      Breath sounds: Normal breath sounds. No wheezing or rales.   Abdominal:      General: Bowel sounds are normal. There is no distension.      Palpations: There is no mass.      Tenderness: There is no abdominal tenderness. There is no rebound.   Musculoskeletal:         General: No swelling or tenderness. Normal range of motion.      Cervical back: Normal range of motion. No rigidity.      Right lower leg: No edema.      Left lower leg: No edema.   Lymphadenopathy:      Head:      Right side of head: No submental, " submandibular, preauricular, posterior auricular or occipital adenopathy.      Left side of head: No submental, submandibular, preauricular, posterior auricular or occipital adenopathy.      Cervical: No cervical adenopathy.      Right cervical: No superficial or posterior cervical adenopathy.     Left cervical: No superficial or posterior cervical adenopathy.      Upper Body:      Right upper body: No supraclavicular or axillary adenopathy.      Left upper body: No supraclavicular or axillary adenopathy.      Lower Body: No right inguinal adenopathy. No left inguinal adenopathy.   Skin:     General: Skin is warm.      Coloration: Skin is not jaundiced.      Findings: No lesion or rash.      Comments: Scar well healed.  No evidence of recurrence at primary site.  No concerning skin lesions   Neurological:      General: No focal deficit present.      Mental Status: He is alert and oriented to person, place, and time.      Cranial Nerves: No cranial nerve deficit.      Motor: No weakness.      Gait: Gait normal.   Psychiatric:         Mood and Affect: Mood normal.         Behavior: Behavior normal.         Thought Content: Thought content normal.         Judgment: Judgment normal.         Labs: I have reviewed the following labs:  Lab Results   Component Value Date/Time    WBC 4.66 01/10/2025 02:01 PM    RBC 4.87 01/10/2025 02:01 PM    Hemoglobin 15.0 01/10/2025 02:01 PM    Hematocrit 46.0 01/10/2025 02:01 PM    MCV 95 01/10/2025 02:01 PM    MCH 30.8 01/10/2025 02:01 PM    RDW 13.4 01/10/2025 02:01 PM    Platelets 179 01/10/2025 02:01 PM    Segmented % 56 01/10/2025 02:01 PM    Lymphocytes % 35 01/10/2025 02:01 PM    Monocytes % 7 01/10/2025 02:01 PM    Eosinophils Relative 2 01/10/2025 02:01 PM    Basophils Relative 0 01/10/2025 02:01 PM    Immature Grans % 0 01/10/2025 02:01 PM    Absolute Neutrophils 2.61 01/10/2025 02:01 PM     Lab Results   Component Value Date/Time    Potassium 4.1 01/10/2025 02:01 PM     Chloride 105 01/10/2025 02:01 PM    CO2 24 01/10/2025 02:01 PM    BUN 25 01/10/2025 02:01 PM    Creatinine 1.49 (H) 01/10/2025 02:01 PM    Glucose, Fasting 98 01/10/2025 02:01 PM    Calcium 9.7 01/10/2025 02:01 PM    AST 33 01/10/2025 02:01 PM    ALT 60 (H) 01/10/2025 02:01 PM    Alkaline Phosphatase 40 01/10/2025 02:01 PM    Total Protein 7.7 01/10/2025 02:01 PM    Albumin 4.7 01/10/2025 02:01 PM    Total Bilirubin 0.59 01/10/2025 02:01 PM    eGFR 53 01/10/2025 02:01 PM     Lab Results   Component Value Date/Time    TSH 3RD GENERATON 1.781 01/10/2025 02:01 PM     01/10/2025 02:01 PM     01/10/2025 02:01 PM        Radiology Results Review: I have reviewed radiology reports from 1/2/2025 including: CT chest, CT abdomen/pelvis, and Ultrasound(s).        Maribel Bronson MD, PhD

## 2025-01-29 NOTE — TELEPHONE ENCOUNTER
Please call the pt back asap.  He had a scheduling error & he is at the [place]  location.  He cancelled his appt with Dr Siu at 11:15 am today via [employer], but wanted to see if he could still do virtually?  Please call him back asap to let him know.  I tried to call the office but nobody answered.  Thank you!!

## 2025-01-29 NOTE — ASSESSMENT & PLAN NOTE
He is doing well with no clinical evidence of melanoma recurrence.  We discussed that imaging from 1/20/2025 with a CT chest, abdomen pelvis was also negative for melanoma recurrence or metastatic disease.  Stable postsurgical changes.  Also underwent an ultrasound of the left axilla on 1/20/2025 that was negative for concerning lymph nodes.  Blood work reviewed and okay.    He will continue to monitor for any new, changing, concerning skin lesions or lymphadenopathy.  He knows to call with issues or concerns prior to his next visit.  We discussed that he is 4 years out from diagnosis.  He will return in 6 months for follow-up with blood work and imaging to be done at that time.  All orders placed and prescription provided.

## 2025-01-29 NOTE — LETTER
2025     Alber Siu MD  [address]  [address]nd [address]  [facility] [address]    Patient: Moris Mercado   YOB: 1975   Date of Visit: 2025       Dear Dr. Siu:    Thank you for referring Moris Mercado to me for evaluation. Below are my notes for this consultation.    If you have questions, please do not hesitate to call me. I look forward to following your patient along with you.         Sincerely,        Maribel Bronson MD        CC: LESTER Zavaleta CRNP Melissa A Wilson, MD  2025  2:01 PM  Sign when Signing Visit  Name: Moris Mercado      : 1975      MRN: [id number]  Encounter Provider: Maribel Bronson MD  Encounter Date: 2025   Encounter department: [facility]  :  Assessment & Plan  Encounter for follow-up surveillance of melanoma  He is doing well with no clinical evidence of melanoma recurrence.  We discussed that imaging from 2025 with a CT chest, abdomen pelvis was also negative for melanoma recurrence or metastatic disease.  Stable postsurgical changes.  Also underwent an ultrasound of the left axilla on 2025 that was negative for concerning lymph nodes.  Blood work reviewed and okay.    He will continue to monitor for any new, changing, concerning skin lesions or lymphadenopathy.  He knows to call with issues or concerns prior to his next visit.  We discussed that he is 4 years out from diagnosis.  He will return in 6 months for follow-up with blood work and imaging to be done at that time.  All orders placed and prescription provided.      Malignant melanoma of upper back (HCC)  He is doing well with no clinical evidence of melanoma recurrence.  We discussed that imaging from 2025 with a CT chest, abdomen pelvis was also negative for melanoma recurrence or metastatic disease.  Stable postsurgical changes.  Also underwent an ultrasound of the left axilla on 2025 that was  negative for concerning lymph nodes.  Blood work reviewed and okay.    He will continue to monitor for any new, changing, concerning skin lesions or lymphadenopathy.  He knows to call with issues or concerns prior to his next visit.  We discussed that he is 4 years out from diagnosis.  He will return in 6 months for follow-up with blood work and imaging to be done at that time.  All orders placed and prescription provided.    Orders:  •  CT chest abdomen pelvis w contrast; Future  •  CBC and differential; Future  •  Comprehensive metabolic panel; Future  •  LD,Blood; Future    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 (HCC)  He is doing well with no clinical evidence of melanoma recurrence.  We discussed that imaging from 1/20/2025 with a CT chest, abdomen pelvis was also negative for melanoma recurrence or metastatic disease.  Stable postsurgical changes.  Also underwent an ultrasound of the left axilla on 1/20/2025 that was negative for concerning lymph nodes.  Blood work reviewed and okay.    He will continue to monitor for any new, changing, concerning skin lesions or lymphadenopathy.  He knows to call with issues or concerns prior to his next visit.  We discussed that he is 4 years out from diagnosis.  He will return in 6 months for follow-up with blood work and imaging to be done at that time.  All orders placed and prescription provided.    Orders:  •  CT chest abdomen pelvis w contrast; Future  •  CBC and differential; Future  •  Comprehensive metabolic panel; Future  •  LD,Blood; Future        History of Present Illness  Chief Complaint   Patient presents with   • Follow-up     He is doing well.  Energy is good.  Eating okay.  No new, changing, concerning skin lesions.  No lymphadenopathy.  No concerns today.    He did undergo imaging prior to today's visit.  He had an ultrasound of the left axilla on 1/20/2025 that demonstrates no concerning lymphadenopathy.  Also had a CT chest, abdomen pelvis on 1/20/2025 that  demonstrates no evidence of melanoma recurrence or metastatic disease.  Stable postsurgical changes in the left upper back.        Oncology History  Cancer Staging   Malignant melanoma of upper back (HCC)  Staging form: Melanoma of the Skin, AJCC 8th Edition  - Pathologic stage from 2/2/2021: Stage IIIB (pT3a, pN2a, cM0) - Signed by Maribel Bronson MD on 2/4/2022  Stage prefix: Initial diagnosis  Oncology History   Malignant melanoma of upper back (HCC)   12/2020 Biopsy    Left upper back shave biopsy:  - Malignant melanoma 2.3mm    Mitosis: 6  Ulceration: not identified     2/2/2021 Surgery    Wide excision with sentinel lymph node biopsy  Lymph Node, [place], number 1, biopsy:  - Metastatic melanoma in one lymph node (1/1, larger focus approximately 1.3 mm; subcapsular and intraparenchyma), no extranodal extension      Skin, left back, excision:  - Residual melanoma, 1.3 mm; prior biopsy site reaction.  - Inked margins with no tumor seen.        Lymph Node, [place], sentinel node #2, biopsy:  - Metastatic melanoma in one of ten lymph nodes (1/10, single and small nests of cells, subcapsular, larger focus 0.11 mm); no extranodal extension.       2/2/2021 -  Cancer Staged    Staging form: Melanoma of the Skin, AJCC 8th Edition  - Pathologic stage from 2/2/2021: Stage IIIB (pT3a, pN2a, cM0) - Signed by Maribel Bronson MD on 2/4/2022  Stage prefix: Initial diagnosis       4/8/2021 Genomic Testing    Decision DX: Class 1B     4/2021 - 4/2022 Chemotherapy     Tafinalar 150 mg b.i.d. Mekinist 2 mg p.o. Daily         Review of Systems   Constitutional:  Negative for activity change, chills, diaphoresis, fatigue, fever and unexpected weight change.   HENT:  Negative for congestion, hearing loss, trouble swallowing and voice change.    Respiratory:  Negative for cough, shortness of breath and wheezing.    Cardiovascular:  Negative for chest pain, palpitations and leg swelling.   Gastrointestinal:  Negative for  "abdominal distention, abdominal pain, constipation, diarrhea, nausea and vomiting.   Musculoskeletal:  Negative for arthralgias and myalgias.   Skin:  Negative for color change and rash.   Neurological:  Negative for dizziness, seizures, speech difficulty, weakness, light-headedness and headaches.   Hematological:  Negative for adenopathy.     Current Outpatient Medications on File Prior to Visit   Medication Sig Dispense Refill   • amLODIPine (NORVASC) 5 mg tablet Take 1 tablet (5 mg total) by mouth daily 30 tablet 0   • lisinopril (ZESTRIL) 20 mg tablet Take 1 tablet (20 mg total) by mouth daily 30 tablet 0   • sertraline (ZOLOFT) 50 mg tablet Take 1 tablet (50 mg total) by mouth daily 30 tablet 0     No current facility-administered medications on file prior to visit.          Objective  /76 (BP Location: Left arm, Patient Position: Sitting, Cuff Size: Adult)   Pulse 97   Temp 97.6 °F (36.4 °C) (Temporal)   Resp 18   Ht 5' 8\" (1.727 m)   Wt 102 kg (225 lb)   SpO2 95%   BMI 34.21 kg/m²     Pain Screening:  Pain Score: 0-No pain  ECOG ECOG Performance Status: 0 - Fully active, able to carry on all pre-disease performance without restriction     Physical Exam  Constitutional:       General: He is not in acute distress.     Appearance: Normal appearance. He is not ill-appearing.   HENT:      Head: Normocephalic and atraumatic.      Right Ear: External ear normal.      Left Ear: External ear normal.      Nose: Nose normal. No congestion.      Mouth/Throat:      Mouth: Mucous membranes are moist.      Pharynx: Oropharynx is clear. No oropharyngeal exudate.   Eyes:      General: No scleral icterus.        Right eye: No discharge.         Left eye: No discharge.      Conjunctiva/sclera: Conjunctivae normal.   Cardiovascular:      Rate and Rhythm: Normal rate and regular rhythm.      Pulses: Normal pulses.      Heart sounds: No murmur heard.     No friction rub. No gallop.   Pulmonary:      Effort: Pulmonary " effort is normal. No respiratory distress.      Breath sounds: Normal breath sounds. No wheezing or rales.   Abdominal:      General: Bowel sounds are normal. There is no distension.      Palpations: There is no mass.      Tenderness: There is no abdominal tenderness. There is no rebound.   Musculoskeletal:         General: No swelling or tenderness. Normal range of motion.      Cervical back: Normal range of motion. No rigidity.      Right lower leg: No edema.      Left lower leg: No edema.   Lymphadenopathy:      Head:      Right side of head: No submental, submandibular, preauricular, posterior auricular or occipital adenopathy.      Left side of head: No submental, submandibular, preauricular, posterior auricular or occipital adenopathy.      Cervical: No cervical adenopathy.      Right cervical: No superficial or posterior cervical adenopathy.     Left cervical: No superficial or posterior cervical adenopathy.      Upper Body:      Right upper body: No supraclavicular or axillary adenopathy.      Left upper body: No supraclavicular or axillary adenopathy.      Lower Body: No right inguinal adenopathy. No left inguinal adenopathy.   Skin:     General: Skin is warm.      Coloration: Skin is not jaundiced.      Findings: No lesion or rash.      Comments: Scar well healed.  No evidence of recurrence at primary site.  No concerning skin lesions   Neurological:      General: No focal deficit present.      Mental Status: He is alert and oriented to person, place, and time.      Cranial Nerves: No cranial nerve deficit.      Motor: No weakness.      Gait: Gait normal.   Psychiatric:         Mood and Affect: Mood normal.         Behavior: Behavior normal.         Thought Content: Thought content normal.         Judgment: Judgment normal.         Labs: I have reviewed the following labs:  Lab Results   Component Value Date/Time    WBC 4.66 01/10/2025 02:01 PM    RBC 4.87 01/10/2025 02:01 PM    Hemoglobin 15.0 01/10/2025  02:01 PM    Hematocrit 46.0 01/10/2025 02:01 PM    MCV 95 01/10/2025 02:01 PM    MCH 30.8 01/10/2025 02:01 PM    RDW 13.4 01/10/2025 02:01 PM    Platelets 179 01/10/2025 02:01 PM    Segmented % 56 01/10/2025 02:01 PM    Lymphocytes % 35 01/10/2025 02:01 PM    Monocytes % 7 01/10/2025 02:01 PM    Eosinophils Relative 2 01/10/2025 02:01 PM    Basophils Relative 0 01/10/2025 02:01 PM    Immature Grans % 0 01/10/2025 02:01 PM    Absolute Neutrophils 2.61 01/10/2025 02:01 PM     Lab Results   Component Value Date/Time    Potassium 4.1 01/10/2025 02:01 PM    Chloride 105 01/10/2025 02:01 PM    CO2 24 01/10/2025 02:01 PM    BUN 25 01/10/2025 02:01 PM    Creatinine 1.49 (H) 01/10/2025 02:01 PM    Glucose, Fasting 98 01/10/2025 02:01 PM    Calcium 9.7 01/10/2025 02:01 PM    AST 33 01/10/2025 02:01 PM    ALT 60 (H) 01/10/2025 02:01 PM    Alkaline Phosphatase 40 01/10/2025 02:01 PM    Total Protein 7.7 01/10/2025 02:01 PM    Albumin 4.7 01/10/2025 02:01 PM    Total Bilirubin 0.59 01/10/2025 02:01 PM    eGFR 53 01/10/2025 02:01 PM     Lab Results   Component Value Date/Time    TSH 3RD GENERATON 1.781 01/10/2025 02:01 PM     01/10/2025 02:01 PM     01/10/2025 02:01 PM        Radiology Results Review: I have reviewed radiology reports from 1/2/2025 including: CT chest, CT abdomen/pelvis, and Ultrasound(s).        Maribel Bronson MD, PhD

## 2025-01-30 ENCOUNTER — TELEPHONE (OUTPATIENT)
Dept: SURGICAL ONCOLOGY | Facility: CLINIC | Age: 50
End: 2025-01-30

## 2025-01-30 NOTE — TELEPHONE ENCOUNTER
BESS for patient and offered multiple times at [place] office on Tuesday and Friday next week to reschedule appt with Dr. Siu that he cancelled yesterday. [employer] number given.

## 2025-02-03 ENCOUNTER — OFFICE VISIT (OUTPATIENT)
Dept: FAMILY MEDICINE CLINIC | Facility: CLINIC | Age: 50
End: 2025-02-03
Payer: COMMERCIAL

## 2025-02-03 VITALS
WEIGHT: 228 LBS | DIASTOLIC BLOOD PRESSURE: 80 MMHG | HEIGHT: 68 IN | TEMPERATURE: 97.1 F | OXYGEN SATURATION: 98 % | SYSTOLIC BLOOD PRESSURE: 132 MMHG | BODY MASS INDEX: 34.56 KG/M2 | HEART RATE: 92 BPM

## 2025-02-03 DIAGNOSIS — Z23 ENCOUNTER FOR IMMUNIZATION: ICD-10-CM

## 2025-02-03 DIAGNOSIS — E78.2 MIXED HYPERLIPIDEMIA: ICD-10-CM

## 2025-02-03 DIAGNOSIS — N18.31 CHRONIC KIDNEY DISEASE, STAGE 3A (HCC): ICD-10-CM

## 2025-02-03 DIAGNOSIS — I10 PRIMARY HYPERTENSION: ICD-10-CM

## 2025-02-03 DIAGNOSIS — Z85.820 HISTORY OF MELANOMA: ICD-10-CM

## 2025-02-03 DIAGNOSIS — Z12.11 SCREENING FOR COLON CANCER: ICD-10-CM

## 2025-02-03 DIAGNOSIS — Z00.00 ANNUAL PHYSICAL EXAM: Primary | ICD-10-CM

## 2025-02-03 PROCEDURE — 99396 PREV VISIT EST AGE 40-64: CPT

## 2025-02-03 PROCEDURE — 99214 OFFICE O/P EST MOD 30 MIN: CPT

## 2025-02-03 RX ORDER — LISINOPRIL 20 MG/1
20 TABLET ORAL DAILY
Qty: 90 TABLET | Refills: 0 | Status: SHIPPED | OUTPATIENT
Start: 2025-02-03

## 2025-02-03 RX ORDER — AMLODIPINE BESYLATE 5 MG/1
5 TABLET ORAL DAILY
Qty: 90 TABLET | Refills: 0 | Status: SHIPPED | OUTPATIENT
Start: 2025-02-03 | End: 2025-05-04

## 2025-02-03 NOTE — PROGRESS NOTES
"Adult Annual Physical  Name: Moris Mercado      : 1975      MRN: [id number]  Encounter Provider: LESTER Zavaleta  Encounter Date: 2/3/2025   Encounter department: [facility]    Assessment & Plan  Annual physical exam    Previous notes reviewed.  Offers no complaints at today's OV.         Primary hypertension    At goal.  Continue Amlodipine & Lisinopril as prescribed.  Counseled on diet & exercise.  Continue to monitor.    Orders:    amLODIPine (NORVASC) 5 mg tablet; Take 1 tablet (5 mg total) by mouth daily    lisinopril (ZESTRIL) 20 mg tablet; Take 1 tablet (20 mg total) by mouth daily    Mixed hyperlipidemia    Reviewed & counseled.  Recheck in 6 mo.    Orders:    Lipid panel; Future    History of melanoma    Dx 2020. S/p wide excision of melanoma on L upper back with sentinel lymph node biopsy. +metastatic melanoma in 1 of 10 lymph nodes. Completed adj chemo.    OV with surveillance imaging with heme onc & surg onc q 6 months - notes reviewed. \"Imaging from 2025 with a CT chest, abdomen pelvis was also negative for melanoma recurrence or metastatic disease. Stable postsurgical changes. Also underwent an ultrasound of the left axilla on 2025 that was negative for concerning lymph nodes. Blood work reviewed and okay. \"         Chronic kidney disease, stage 3a (HCC)  Lab Results   Component Value Date    EGFR 53 01/10/2025    EGFR 64 2024    EGFR 57 2023    CREATININE 1.49 (H) 01/10/2025    CREATININE 1.29 2024    CREATININE 1.41 (H) 2023     Counseled.  Continue to trend kidney function.    Orders:    Comprehensive metabolic panel; Future    Encounter for immunization    Counseled.    Orders:    influenza vaccine, recombinant, PF, 0.5 mL IM (Flublok)    Zoster Vaccine Recombinant IM    Screening for colon cancer    Orders:    Ambulatory Referral to Gastroenterology; Future    Cologuard    Immunizations and preventive care screenings " were discussed with patient today. Appropriate education was printed on patient's after visit summary.    Discussed risks and benefits of prostate cancer screening. We discussed the controversial history of PSA screening for prostate cancer in the [place] as well as the risk of over detection and over treatment of prostate cancer by way of PSA screening.  The patient understands that PSA blood testing is an imperfect way to screen for prostate cancer and that elevated PSA levels in the blood may also be caused by infection, inflammation, prostatic trauma or manipulation, urological procedures, or by benign prostatic enlargement.    The role of the digital rectal examination in prostate cancer screening was also discussed and I discussed with him that there is large interobserver variability in the findings of digital rectal examination.    Counseling:  Alcohol/drug use: discussed moderation in alcohol intake, the recommendations for healthy alcohol use, and avoidance of illicit drug use.  Dental Health: discussed importance of regular tooth brushing, flossing, and dental visits.  Injury prevention: discussed safety/seat belts, safety helmets, smoke detectors, carbon monoxide detectors, and smoking near bedding or upholstery.  Sexual health: discussed sexually transmitted diseases, partner selection, use of condoms, avoidance of unintended pregnancy, and contraceptive alternatives.  Exercise: the importance of regular exercise/physical activity was discussed. Recommend exercise 3-5 times per week for at least 30 minutes.       Depression Screening and Follow-up Plan: Patient was screened for depression during today's encounter. They screened negative with a PHQ-2 score of 0.        History of Present Illness     Adult Annual Physical:  Patient presents for annual physical.     Diet and Physical Activity:  - Diet/Nutrition: well balanced diet. eats a lot of red meat  - Exercise: no formal exercise.    Depression  Screening:  - PHQ-2 Score: 0    General Health:  - Sleep: sleeps well.  - Hearing: normal hearing right ear and normal hearing left ear.  - Vision: wears glasses and most recent eye exam > 1 year ago.  - Dental: regular dental visits.     Health:  - History of STDs: no.   - Urinary symptoms: none.     Advanced Care Planning:  - Has an advanced directive?: no    - Has a durable medical POA?: no    - ACP document given to patient?: yes      Review of Systems   Constitutional:  Negative for chills, fatigue and fever.   HENT:  Negative for congestion, ear discharge, ear pain, facial swelling, rhinorrhea, sinus pressure, sinus pain, sore throat and trouble swallowing.    Eyes:  Negative for photophobia, pain and visual disturbance.   Respiratory:  Negative for cough, chest tightness, shortness of breath and wheezing.    Cardiovascular:  Negative for chest pain, palpitations and leg swelling.   Gastrointestinal:  Negative for abdominal pain, diarrhea, nausea and vomiting.   Genitourinary:  Negative for dysuria, flank pain and hematuria.   Musculoskeletal:  Negative for arthralgias, back pain, myalgias and neck pain.   Skin:  Negative for pallor and wound.   Neurological:  Negative for dizziness, syncope, weakness, numbness and headaches.   Psychiatric/Behavioral:  Negative for confusion and sleep disturbance.    All other systems reviewed and are negative.    Medical History Reviewed by provider this encounter:  Tobacco  Allergies  Meds  Problems  Med Hx  Surg Hx  Fam Hx     .  Current Outpatient Medications on File Prior to Visit   Medication Sig Dispense Refill    amLODIPine (NORVASC) 5 mg tablet Take 1 tablet (5 mg total) by mouth daily 30 tablet 0    lisinopril (ZESTRIL) 20 mg tablet Take 1 tablet (20 mg total) by mouth daily 30 tablet 0    sertraline (ZOLOFT) 50 mg tablet Take 1 tablet (50 mg total) by mouth daily 30 tablet 0     No current facility-administered medications on file prior to visit.     "  Social History     Tobacco Use    Smoking status: Never    Smokeless tobacco: Never   Vaping Use    Vaping status: Never Used   Substance and Sexual Activity    Alcohol use: Yes    Drug use: Never    Sexual activity: Not on file       Objective   /80   Pulse 92   Temp (!) 97.1 °F (36.2 °C)   Ht 5' 8\" (1.727 m)   Wt 103 kg (228 lb)   SpO2 98%   BMI 34.67 kg/m²     Physical Exam  Vitals and nursing note reviewed.   Constitutional:       General: He is not in acute distress.     Appearance: Normal appearance. He is well-developed. He is not ill-appearing.   HENT:      Head: Normocephalic and atraumatic.      Jaw: There is normal jaw occlusion.      Right Ear: Tympanic membrane normal.      Left Ear: Tympanic membrane normal.      Nose: Nose normal.      Mouth/Throat:      Pharynx: Oropharynx is clear. No oropharyngeal exudate or posterior oropharyngeal erythema.   Eyes:      Extraocular Movements: Extraocular movements intact.      Conjunctiva/sclera: Conjunctivae normal.   Neck:      Thyroid: No thyroid mass.      Vascular: No carotid bruit or JVD.      Trachea: Trachea and phonation normal.   Cardiovascular:      Rate and Rhythm: Normal rate and regular rhythm.      Heart sounds: S1 normal and S2 normal. No murmur heard.  Pulmonary:      Effort: Pulmonary effort is normal. No tachypnea or respiratory distress.      Breath sounds: Normal breath sounds and air entry. No stridor. No decreased breath sounds.   Chest:      Chest wall: No tenderness.   Abdominal:      General: Bowel sounds are normal. There is no distension.      Palpations: Abdomen is soft.      Tenderness: There is no abdominal tenderness. There is no right CVA tenderness or left CVA tenderness.   Musculoskeletal:         General: No swelling.      Cervical back: Normal range of motion and neck supple.      Right lower leg: No edema.      Left lower leg: No edema.   Lymphadenopathy:      Cervical: No cervical adenopathy.   Skin:     " General: Skin is warm and dry.      Capillary Refill: Capillary refill takes less than 2 seconds.      Findings: No rash.   Neurological:      General: No focal deficit present.      Mental Status: He is alert and oriented to person, place, and time.      Cranial Nerves: Cranial nerves 2-12 are intact.      Sensory: Sensation is intact.      Motor: Motor function is intact.      Coordination: Coordination is intact.      Gait: Gait is intact.   Psychiatric:         Mood and Affect: Mood normal.         Behavior: Behavior is cooperative.

## 2025-02-03 NOTE — ASSESSMENT & PLAN NOTE
"  Dx 12/2020. S/p wide excision of melanoma on L upper back with sentinel lymph node biopsy. +metastatic melanoma in 1 of 10 lymph nodes. Completed adj chemo.    OV with surveillance imaging with heme onc & surg onc q 6 months - notes reviewed. \"Imaging from 1/20/2025 with a CT chest, abdomen pelvis was also negative for melanoma recurrence or metastatic disease. Stable postsurgical changes. Also underwent an ultrasound of the left axilla on 1/20/2025 that was negative for concerning lymph nodes. Blood work reviewed and okay. \"         "

## 2025-02-03 NOTE — ASSESSMENT & PLAN NOTE
At goal.  Continue Amlodipine & Lisinopril as prescribed.  Counseled on diet & exercise.  Continue to monitor.    Orders:    amLODIPine (NORVASC) 5 mg tablet; Take 1 tablet (5 mg total) by mouth daily    lisinopril (ZESTRIL) 20 mg tablet; Take 1 tablet (20 mg total) by mouth daily

## 2025-02-03 NOTE — ASSESSMENT & PLAN NOTE
Lab Results   Component Value Date    EGFR 53 01/10/2025    EGFR 64 01/17/2024    EGFR 57 11/08/2023    CREATININE 1.49 (H) 01/10/2025    CREATININE 1.29 01/17/2024    CREATININE 1.41 (H) 11/08/2023     Counseled.  Continue to trend kidney function.    Orders:    Comprehensive metabolic panel; Future

## 2025-02-04 ENCOUNTER — OFFICE VISIT (OUTPATIENT)
Dept: SURGICAL ONCOLOGY | Facility: CLINIC | Age: 50
End: 2025-02-04
Payer: COMMERCIAL

## 2025-02-04 VITALS
DIASTOLIC BLOOD PRESSURE: 82 MMHG | WEIGHT: 225 LBS | OXYGEN SATURATION: 98 % | SYSTOLIC BLOOD PRESSURE: 120 MMHG | BODY MASS INDEX: 34.1 KG/M2 | TEMPERATURE: 98.3 F | HEART RATE: 84 BPM | RESPIRATION RATE: 16 BRPM | HEIGHT: 68 IN

## 2025-02-04 DIAGNOSIS — C77.9 MELANOMA METASTATIC TO LYMPH NODE (HCC): ICD-10-CM

## 2025-02-04 DIAGNOSIS — Z08 ENCOUNTER FOR FOLLOW-UP SURVEILLANCE OF MELANOMA: Primary | ICD-10-CM

## 2025-02-04 DIAGNOSIS — Z85.820 PERSONAL HISTORY OF MALIGNANT MELANOMA: ICD-10-CM

## 2025-02-04 DIAGNOSIS — C43.59 MALIGNANT MELANOMA OF UPPER BACK (HCC): ICD-10-CM

## 2025-02-04 DIAGNOSIS — Z85.820 ENCOUNTER FOR FOLLOW-UP SURVEILLANCE OF MELANOMA: Primary | ICD-10-CM

## 2025-02-04 PROCEDURE — 99214 OFFICE O/P EST MOD 30 MIN: CPT | Performed by: SURGERY

## 2025-02-04 NOTE — PROGRESS NOTES
Surgical Oncology Follow Up       [address]  [facility] [place]  [address]  [facility] [address]    Moris Mercado  1975  [id number]  [address] [facility] [place]  [address]  [facility] [address]    Chief Complaint   Patient presents with    Follow-up       Assessment/Plan:    No problem-specific Assessment & Plan notes found for this encounter.       Diagnoses and all orders for this visit:    Encounter for follow-up surveillance of melanoma    Personal history of malignant melanoma    Malignant melanoma of upper back (HCC)    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 (HCC)      Advance Care Planning/Advance Directives:  Discussed disease status, cancer treatment plans and/or cancer treatment goals with the patient.     Oncology History   Malignant melanoma of upper back (HCC)   12/2020 Biopsy    Left upper back shave biopsy:  - Malignant melanoma 2.3mm    Mitosis: 6  Ulceration: not identified     2/2/2021 Surgery    Wide excision with sentinel lymph node biopsy  Lymph Node, [place], number 1, biopsy:  - Metastatic melanoma in one lymph node (1/1, larger focus approximately 1.3 mm; subcapsular and intraparenchyma), no extranodal extension      Skin, left back, excision:  - Residual melanoma, 1.3 mm; prior biopsy site reaction.  - Inked margins with no tumor seen.        Lymph Node, [place], sentinel node #2, biopsy:  - Metastatic melanoma in one of ten lymph nodes (1/10, single and small nests of cells, subcapsular, larger focus 0.11 mm); no extranodal extension.       2/2/2021 -  Cancer Staged    Staging form: Melanoma of the Skin, AJCC 8th Edition  - Pathologic stage from 2/2/2021: Stage IIIB (pT3a, pN2a, cM0) - Signed by Maribel Bronson MD on 2/4/2022  Stage prefix: Initial diagnosis       4/8/2021 Genomic Testing    Decision DX: Class 1B     4/2021 - 4/2022 Chemotherapy     Tafinalar 150 mg b.i.d. Mekinist 2 mg p.o.  Daily         History of Present Illness: 49-year-old man, history of stage III melanoma here for surveillance visit.  -Interval History: He has no new skin issues to report.  He had recent ultrasound of the axilla as well as CAT scan done in anticipation of today's visit for surveillance.  He follows closely Dr. Bronson along with myself.    Review of Systems:  Review of Systems   Constitutional: Negative.    HENT: Negative.     Eyes: Negative.    Respiratory: Negative.     Cardiovascular: Negative.    Gastrointestinal: Negative.    Endocrine: Negative.    Genitourinary: Negative.    Musculoskeletal: Negative.    Skin: Negative.    Allergic/Immunologic: Negative.    Neurological: Negative.    Hematological: Negative.    Psychiatric/Behavioral: Negative.     All other systems reviewed and are negative.      Patient Active Problem List   Diagnosis    Malignant melanoma of upper back (HCC)    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 (HCC)    Primary hypertension    Anxiety    Mixed hyperlipidemia    Hypertriglyceridemia    Encounter for screening colonoscopy    Encounter for follow-up surveillance of melanoma    History of melanoma    Chronic kidney disease, stage 3a (HCC)     No past medical history on file.  Past Surgical History:   Procedure Laterality Date    LYMPH NODE BIOPSY N/A 2/2/2021    Procedure: BIOPSY LYMPH NODE LEFT AXILLARY SENTINEL;  Surgeon: Alber Siu MD;  Location: [facility];  Service: Surgical Oncology    NO PAST SURGERIES      SKIN LESION EXCISION Left 2/2/2021    Procedure: WIDE EXCISION MELANOMA SCAR UPPER BACK, intraoperative lymphatic mapping, sentinel lymph node biopsy; [address];  Surgeon: Alber Siu MD;  Location: [facility];  Service: Surgical Oncology     Family History   Problem Relation Age of Onset    Basal cell carcinoma Father     No Known Problems Mother     Breast cancer Paternal Aunt 42    Breast cancer Paternal Grandmother 80     Social History     Socioeconomic History     Marital status: /Civil Union     Spouse name: Not on file    Number of children: Not on file    Years of education: Not on file    Highest education level: Not on file   Occupational History    Not on file   Tobacco Use    Smoking status: Never    Smokeless tobacco: Never   Vaping Use    Vaping status: Never Used   Substance and Sexual Activity    Alcohol use: Yes    Drug use: Never    Sexual activity: Not on file   Other Topics Concern    Not on file   Social History Narrative    Not on file     Social Drivers of Health     Financial Resource Strain: Not on file   Food Insecurity: Not on file   Transportation Needs: Not on file   Physical Activity: Not on file   Stress: Not on file   Social Connections: Not on file   Intimate Partner Violence: Not on file   Housing Stability: Not on file       Current Outpatient Medications:     amLODIPine (NORVASC) 5 mg tablet, Take 1 tablet (5 mg total) by mouth daily, Disp: 90 tablet, Rfl: 0    lisinopril (ZESTRIL) 20 mg tablet, Take 1 tablet (20 mg total) by mouth daily, Disp: 90 tablet, Rfl: 0    sertraline (ZOLOFT) 50 mg tablet, Take 1 tablet (50 mg total) by mouth daily, Disp: 30 tablet, Rfl: 0  No Known Allergies  Vitals:    02/04/25 0848   BP: 120/82   Pulse: 84   Resp: 16   Temp: 98.3 °F (36.8 °C)   SpO2: 98%       Physical Exam  Vitals reviewed.   Constitutional:       Appearance: Normal appearance.   HENT:      Head: Normocephalic and atraumatic.      Nose: Nose normal.   Eyes:      Extraocular Movements: Extraocular movements intact.      Pupils: Pupils are equal, round, and reactive to light.   Cardiovascular:      Rate and Rhythm: Regular rhythm.      Pulses: Normal pulses.      Heart sounds: Normal heart sounds.   Pulmonary:      Effort: Pulmonary effort is normal.      Breath sounds: Normal breath sounds.   Abdominal:      General: Abdomen is flat.      Palpations: Abdomen is soft.   Musculoskeletal:         General: Normal range of motion.      Cervical  back: Normal range of motion and neck supple.   Skin:     General: Skin is warm and dry.      Comments: Left upper back excision site without evidence of recurrence visible or palpable.  Negative for cervical or axillary adenopathy.   Neurological:      General: No focal deficit present.      Mental Status: He is alert and oriented to person, place, and time.   Psychiatric:         Mood and Affect: Mood normal.         Behavior: Behavior normal.           Results:  Labs:          Component  Ref Range & Units (hover) 1/10/25  2:01 PM 11/8/23  6:21 AM 4/7/23  7:25 AM 9/14/22  7:23 AM 2/26/21  2:03 PM    167 161 162 200   LD-1 18 18 19 20 21   LD-2 27 33 29 31 32   LD-3 24 25 25 24 22   LD-4 12 11 11 11 10   LD-5 19 13 16 14 15       Imaging  US axilla  Result Date: 1/22/2025  Narrative: SUPERFICIAL SOFT TISSUE ULTRASOUND INDICATION:   C77.9: Secondary and unspecified malignant neoplasm of lymph node, unspecified C43.59: Malignant melanoma of other part of trunk.   Per the sonographer: History of left upper back melanoma 5 years ago. 2 out of 13 left axillary lymph nodes positive at that time. No axillary complaints or palpable mass. COMPARISON: 1/22/2024 axillary ultrasound. 1/20/2025 chest CT. TECHNIQUE:   Real-time ultrasound of the left axilla was performed with both volumetric sweeps and still imaging techniques. FINDINGS: No lymph nodes are demonstrated. No masses. Normal appearance of soft tissues.     Impression: No axillary lymph nodes identified. Workstation performed: [id number]     CT chest abdomen pelvis w contrast  Result Date: 1/20/2025  Narrative: CT CHEST, ABDOMEN AND PELVIS WITH IV CONTRAST INDICATION:   Malignant melanoma of other part of trunk. Secondary and unspecified malignant neoplasm of lymph node, unspecified. . COMPARISON: 1/22/2024. TECHNIQUE: CT examination of the chest, abdomen and pelvis was performed. Multiplanar 2D reformatted images were created from the source data. This  examination, like all CT scans performed in the [facility], was performed utilizing techniques to minimize radiation dose exposure, including the use of iterative reconstruction and automated exposure control. Radiation dose length product (DLP) for this visit: IV Contrast: 100 cc of Omnipaque 350 Enteric Contrast: Enteric contrast was not administered. FINDINGS: CHEST LUNGS: Stable faint groundglass opacities throughout the lung are again noted. No discrete pulmonary lesions.  There is no tracheal or endobronchial lesion. PLEURA:  Unremarkable. HEART/GREAT VESSELS: Heart is unremarkable for patient's age.  No thoracic aortic aneurysm. MEDIASTINUM AND MAGALYS:  Unremarkable. CHEST WALL AND LOWER NECK: Postsurgical changes in the left upper back without discrete lesion. Left axillary clips. No axillary lymphadenopathy. Stable left axillary lymph node measures 7 mm in short axis image 32 series 3 ABDOMEN LIVER/BILIARY TREE:  Liver is diffusely decreased in density consistent with fatty change.  No CT evidence of suspicious hepatic mass.  Normal hepatic contours.  No biliary dilatation. Left hepatic lobe cyst GALLBLADDER:  No calcified gallstones. No pericholecystic inflammatory change. SPLEEN:  Unremarkable. PANCREAS:  Unremarkable. ADRENAL GLANDS:  Unremarkable. KIDNEYS/URETERS:  One or more simple renal cyst(s) is noted.  Otherwise unremarkable kidneys.  No hydronephrosis. STOMACH AND BOWEL:  There is colonic diverticulosis without evidence of acute diverticulitis. APPENDIX:  No findings to suggest appendicitis. ABDOMINOPELVIC CAVITY:  No ascites.  No pneumoperitoneum.  No lymphadenopathy. VESSELS:  Atherosclerotic changes are present.  No evidence of aneurysm. PELVIS REPRODUCTIVE ORGANS:  Unremarkable for patient's age. URINARY BLADDER: Mild bladder wall thickening. ABDOMINAL WALL/INGUINAL REGIONS:  Unremarkable. OSSEOUS STRUCTURES:  No acute fracture or destructive osseous lesion.  Spinal  degenerative changes are noted.     Impression: 1. No recurrent or metastatic disease. No abdominal or thoracic lymphadenopathy. No suspicious pulmonary lesions. Postsurgical changes in the left upper back. No axillary lymphadenopathy. 2. Hepatic steatosis. Stable left hepatic lobe cyst. 3. Colonic diverticulosis. Electronically signed: 01/20/2025 10:14 PM Tor Holbrook MD    I reviewed the above laboratory and imaging data.    Discussion/Summary: History of stage III melanoma of the back.  Doing well.  4 years post treatment.  Follow-up in 6 months cat scan, blood work, and ultrasound at that time.

## 2025-03-25 DIAGNOSIS — Z12.11 SCREENING FOR COLORECTAL CANCER: Primary | ICD-10-CM

## 2025-03-25 DIAGNOSIS — Z12.12 SCREENING FOR COLORECTAL CANCER: Primary | ICD-10-CM

## 2025-05-08 DIAGNOSIS — I10 PRIMARY HYPERTENSION: ICD-10-CM

## 2025-05-08 RX ORDER — AMLODIPINE BESYLATE 5 MG/1
5 TABLET ORAL DAILY
Qty: 90 TABLET | Refills: 1 | Status: SHIPPED | OUTPATIENT
Start: 2025-05-08 | End: 2025-11-04

## 2025-05-08 RX ORDER — LISINOPRIL 20 MG/1
20 TABLET ORAL DAILY
Qty: 90 TABLET | Refills: 1 | Status: SHIPPED | OUTPATIENT
Start: 2025-05-08

## 2025-05-15 ENCOUNTER — TELEPHONE (OUTPATIENT)
Age: 50
End: 2025-05-15

## 2025-05-15 NOTE — TELEPHONE ENCOUNTER
Spoke with patient to reschedule his visit with Dr. Bronson, he states he will have to call back to reschedule due to work. Appointment cancelled for now.

## 2025-06-05 DIAGNOSIS — Z12.12 SCREENING FOR COLORECTAL CANCER: Primary | ICD-10-CM

## 2025-06-05 DIAGNOSIS — Z12.11 SCREENING FOR COLORECTAL CANCER: Primary | ICD-10-CM

## 2025-07-30 ENCOUNTER — TELEPHONE (OUTPATIENT)
Dept: SURGICAL ONCOLOGY | Facility: CLINIC | Age: 50
End: 2025-07-30

## (undated) DEVICE — SUT SILK 2-0 SH 30 IN K833H

## (undated) DEVICE — DRAPE PROBE NEO-PROBE/ULTRASOUND

## (undated) DEVICE — DRAPE TOWEL: Brand: CONVERTORS

## (undated) DEVICE — BETHLEHEM UNIVERSAL MINOR GEN: Brand: CARDINAL HEALTH

## (undated) DEVICE — INTENDED FOR TISSUE SEPARATION, AND OTHER PROCEDURES THAT REQUIRE A SHARP SURGICAL BLADE TO PUNCTURE OR CUT.: Brand: BARD-PARKER ® CARBON RIB-BACK BLADES

## (undated) DEVICE — ADHESIVE SKIN HIGH VISCOSITY EXOFIN 1ML

## (undated) DEVICE — SUT MONOCRYL 4-0 PS-2 27 IN Y426H

## (undated) DEVICE — ELECTRODE NEEDLE MOD E-Z CLEAN 2.75IN 7CM -0013M

## (undated) DEVICE — GLOVE INDICATOR PI UNDERGLOVE SZ 7 BLUE

## (undated) DEVICE — MEDI-VAC YANKAUER SUCTION HANDLE W/STRAIGHT TIP & CONTROL VENT: Brand: CARDINAL HEALTH

## (undated) DEVICE — SUT VICRYL 0 UR-6 27 IN J603H

## (undated) DEVICE — TIBURON SPLIT SHEET: Brand: CONVERTORS

## (undated) DEVICE — GLOVE SRG BIOGEL ECLIPSE 7

## (undated) DEVICE — SUT ETHILON 2-0 FSLX 30 IN 1674H

## (undated) DEVICE — 3000CC GUARDIAN II: Brand: GUARDIAN

## (undated) DEVICE — INTENDED FOR TISSUE SEPARATION, AND OTHER PROCEDURES THAT REQUIRE A SHARP SURGICAL BLADE TO PUNCTURE OR CUT.: Brand: BARD-PARKER SAFETY BLADES SIZE 15, STERILE

## (undated) DEVICE — TELFA NON-ADHERENT ABSORBENT DRESSING: Brand: TELFA

## (undated) DEVICE — CHLORAPREP HI-LITE 26ML ORANGE

## (undated) DEVICE — 3M™ TEGADERM™ TRANSPARENT FILM DRESSING FRAME STYLE, 1626W, 4 IN X 4-3/4 IN (10 CM X 12 CM), 50/CT 4CT/CASE: Brand: 3M™ TEGADERM™

## (undated) DEVICE — ELECTRODE BLADE MOD E-Z CLEAN 2.5IN 6.4CM -0012M

## (undated) DEVICE — SKIN MARKER DUAL TIP WITH RULER CAP, FLEXIBLE RULER AND LABELS: Brand: DEVON

## (undated) DEVICE — MEDI-VAC YANK SUCT HNDL W/TPRD BULBOUS TIP: Brand: CARDINAL HEALTH

## (undated) DEVICE — PLUMEPEN PRO 10FT

## (undated) DEVICE — LIGACLIP MCA MULTIPLE CLIP APPLIERS, 20 SMALL CLIPS: Brand: LIGACLIP

## (undated) DEVICE — GAUZE SPONGES,16 PLY: Brand: CURITY

## (undated) DEVICE — SUT VICRYL 2-0 SH 27 IN UNDYED J417H

## (undated) DEVICE — SUT VICRYL 3-0 SH 27 IN J416H

## (undated) DEVICE — JACKSON-PRATT 100CC BULB RESERVOIR: Brand: CARDINAL HEALTH

## (undated) DEVICE — TUBING SUCTION 5MM X 12 FT

## (undated) DEVICE — SCD SEQUENTIAL COMPRESSION COMFORT SLEEVE MEDIUM KNEE LENGTH: Brand: KENDALL SCD

## (undated) DEVICE — PENCIL ELECTROSURG E-Z CLEAN -0035H

## (undated) DEVICE — DRAIN JACKSON PRATT 10FR 1/8END: Brand: CARDINAL HEALTH

## (undated) DEVICE — LIGHT GLOVE GREEN